# Patient Record
Sex: FEMALE | Race: BLACK OR AFRICAN AMERICAN | NOT HISPANIC OR LATINO | Employment: OTHER | URBAN - METROPOLITAN AREA
[De-identification: names, ages, dates, MRNs, and addresses within clinical notes are randomized per-mention and may not be internally consistent; named-entity substitution may affect disease eponyms.]

---

## 2018-01-30 ENCOUNTER — TELEPHONE (OUTPATIENT)
Dept: ENDOCRINOLOGY | Facility: CLINIC | Age: 68
End: 2018-01-30

## 2018-04-13 ENCOUNTER — TELEPHONE (OUTPATIENT)
Dept: ENDOCRINOLOGY | Facility: CLINIC | Age: 68
End: 2018-04-13

## 2018-04-13 NOTE — TELEPHONE ENCOUNTER
----- Message from Constance Perdue sent at 4/13/2018  1:41 PM CDT -----  Contact: self  Type:  Patient Returning Call    Who Called:  self  Who Left Message for Patient:  Reena  Does the patient know what this is regarding?:  appointment  Best Call Back Number:  [phone]  Additional Information:

## 2018-04-27 ENCOUNTER — TELEPHONE (OUTPATIENT)
Dept: ENDOCRINOLOGY | Facility: CLINIC | Age: 68
End: 2018-04-27

## 2018-04-27 NOTE — TELEPHONE ENCOUNTER
----- Message from Craig Amaya sent at 4/27/2018  3:17 PM CDT -----  Contact: Patient  Type:  Patient Returning Call    Who Called:  Jonathan  Who Left Message for Patient:  Genera  Does the patient know what this is regarding?:  No  Best Call Back Number:  [phone]  Additional Information:  n/a

## 2018-05-10 ENCOUNTER — LAB VISIT (OUTPATIENT)
Dept: LAB | Facility: HOSPITAL | Age: 68
End: 2018-05-10
Attending: PHYSICIAN ASSISTANT
Payer: MEDICARE

## 2018-05-10 ENCOUNTER — OFFICE VISIT (OUTPATIENT)
Dept: ENDOCRINOLOGY | Facility: CLINIC | Age: 68
End: 2018-05-10
Payer: MEDICARE

## 2018-05-10 VITALS
SYSTOLIC BLOOD PRESSURE: 133 MMHG | RESPIRATION RATE: 16 BRPM | HEART RATE: 54 BPM | WEIGHT: 141.56 LBS | HEIGHT: 61 IN | DIASTOLIC BLOOD PRESSURE: 77 MMHG | BODY MASS INDEX: 26.73 KG/M2

## 2018-05-10 DIAGNOSIS — R73.9 HYPERGLYCEMIA: ICD-10-CM

## 2018-05-10 DIAGNOSIS — E05.90 HYPERTHYROIDISM: ICD-10-CM

## 2018-05-10 DIAGNOSIS — E55.9 HYPOVITAMINOSIS D: ICD-10-CM

## 2018-05-10 DIAGNOSIS — Z78.0 POSTMENOPAUSAL: ICD-10-CM

## 2018-05-10 DIAGNOSIS — E78.5 HYPERLIPIDEMIA, UNSPECIFIED HYPERLIPIDEMIA TYPE: ICD-10-CM

## 2018-05-10 DIAGNOSIS — I10 HYPERTENSION, UNSPECIFIED TYPE: ICD-10-CM

## 2018-05-10 DIAGNOSIS — E05.90 HYPERTHYROIDISM: Primary | ICD-10-CM

## 2018-05-10 DIAGNOSIS — E04.1 NODULAR THYROID DISEASE: ICD-10-CM

## 2018-05-10 LAB
25(OH)D3+25(OH)D2 SERPL-MCNC: 33 NG/ML
ALBUMIN SERPL BCP-MCNC: 4 G/DL
ALP SERPL-CCNC: 54 U/L
ALT SERPL W/O P-5'-P-CCNC: 20 U/L
ANION GAP SERPL CALC-SCNC: 9 MMOL/L
AST SERPL-CCNC: 18 U/L
BASOPHILS # BLD AUTO: 0.02 K/UL
BASOPHILS NFR BLD: 0.4 %
BILIRUB SERPL-MCNC: 0.5 MG/DL
BUN SERPL-MCNC: 17 MG/DL
CA-I BLDV-SCNC: 1.31 MMOL/L
CALCIUM SERPL-MCNC: 10 MG/DL
CHLORIDE SERPL-SCNC: 104 MMOL/L
CHOLEST SERPL-MCNC: 188 MG/DL
CHOLEST/HDLC SERPL: 3.4 {RATIO}
CO2 SERPL-SCNC: 31 MMOL/L
CREAT SERPL-MCNC: 0.8 MG/DL
DIFFERENTIAL METHOD: ABNORMAL
EOSINOPHIL # BLD AUTO: 0.1 K/UL
EOSINOPHIL NFR BLD: 1.5 %
ERYTHROCYTE [DISTWIDTH] IN BLOOD BY AUTOMATED COUNT: 12.1 %
EST. GFR  (AFRICAN AMERICAN): >60 ML/MIN/1.73 M^2
EST. GFR  (NON AFRICAN AMERICAN): >60 ML/MIN/1.73 M^2
ESTIMATED AVG GLUCOSE: 105 MG/DL
GLUCOSE SERPL-MCNC: 87 MG/DL
HBA1C MFR BLD HPLC: 5.3 %
HCT VFR BLD AUTO: 41.4 %
HDLC SERPL-MCNC: 56 MG/DL
HDLC SERPL: 29.8 %
HGB BLD-MCNC: 13.2 G/DL
IMM GRANULOCYTES # BLD AUTO: 0.01 K/UL
IMM GRANULOCYTES NFR BLD AUTO: 0.2 %
LDLC SERPL CALC-MCNC: 114.4 MG/DL
LYMPHOCYTES # BLD AUTO: 1.8 K/UL
LYMPHOCYTES NFR BLD: 38.2 %
MAGNESIUM SERPL-MCNC: 2.1 MG/DL
MCH RBC QN AUTO: 32.4 PG
MCHC RBC AUTO-ENTMCNC: 31.9 G/DL
MCV RBC AUTO: 102 FL
MONOCYTES # BLD AUTO: 0.3 K/UL
MONOCYTES NFR BLD: 6.3 %
NEUTROPHILS # BLD AUTO: 2.6 K/UL
NEUTROPHILS NFR BLD: 53.4 %
NONHDLC SERPL-MCNC: 132 MG/DL
NRBC BLD-RTO: 0 /100 WBC
PHOSPHATE SERPL-MCNC: 3 MG/DL
PLATELET # BLD AUTO: 295 K/UL
PMV BLD AUTO: 9.9 FL
POTASSIUM SERPL-SCNC: 3.7 MMOL/L
PROT SERPL-MCNC: 7.7 G/DL
PTH-INTACT SERPL-MCNC: 54 PG/ML
RBC # BLD AUTO: 4.07 M/UL
SODIUM SERPL-SCNC: 144 MMOL/L
T3 SERPL-MCNC: 125 NG/DL
T4 FREE SERPL-MCNC: 0.96 NG/DL
THYROPEROXIDASE IGG SERPL-ACNC: 519.4 IU/ML
TRIGL SERPL-MCNC: 88 MG/DL
TSH SERPL DL<=0.005 MIU/L-ACNC: 0.03 UIU/ML
WBC # BLD AUTO: 4.79 K/UL

## 2018-05-10 PROCEDURE — 84443 ASSAY THYROID STIM HORMONE: CPT

## 2018-05-10 PROCEDURE — 99999 PR PBB SHADOW E&M-EST. PATIENT-LVL III: CPT | Mod: PBBFAC,,, | Performed by: PHYSICIAN ASSISTANT

## 2018-05-10 PROCEDURE — 86376 MICROSOMAL ANTIBODY EACH: CPT

## 2018-05-10 PROCEDURE — 84439 ASSAY OF FREE THYROXINE: CPT

## 2018-05-10 PROCEDURE — 99203 OFFICE O/P NEW LOW 30 MIN: CPT | Mod: S$GLB,,, | Performed by: PHYSICIAN ASSISTANT

## 2018-05-10 PROCEDURE — 80061 LIPID PANEL: CPT

## 2018-05-10 PROCEDURE — 83970 ASSAY OF PARATHORMONE: CPT

## 2018-05-10 PROCEDURE — 83519 RIA NONANTIBODY: CPT

## 2018-05-10 PROCEDURE — 84480 ASSAY TRIIODOTHYRONINE (T3): CPT

## 2018-05-10 PROCEDURE — 84432 ASSAY OF THYROGLOBULIN: CPT

## 2018-05-10 PROCEDURE — 83520 IMMUNOASSAY QUANT NOS NONAB: CPT

## 2018-05-10 PROCEDURE — 84445 ASSAY OF TSI GLOBULIN: CPT

## 2018-05-10 PROCEDURE — 82330 ASSAY OF CALCIUM: CPT

## 2018-05-10 PROCEDURE — 82306 VITAMIN D 25 HYDROXY: CPT

## 2018-05-10 PROCEDURE — 83735 ASSAY OF MAGNESIUM: CPT

## 2018-05-10 PROCEDURE — 36415 COLL VENOUS BLD VENIPUNCTURE: CPT | Mod: PO

## 2018-05-10 PROCEDURE — 84100 ASSAY OF PHOSPHORUS: CPT

## 2018-05-10 PROCEDURE — 83036 HEMOGLOBIN GLYCOSYLATED A1C: CPT

## 2018-05-10 PROCEDURE — 85025 COMPLETE CBC W/AUTO DIFF WBC: CPT

## 2018-05-10 PROCEDURE — 80053 COMPREHEN METABOLIC PANEL: CPT

## 2018-05-10 RX ORDER — VALSARTAN AND HYDROCHLOROTHIAZIDE 160; 25 MG/1; MG/1
TABLET ORAL
COMMUNITY
End: 2018-11-12

## 2018-05-10 RX ORDER — SIMVASTATIN 40 MG/1
TABLET, FILM COATED ORAL
COMMUNITY
End: 2021-02-26

## 2018-05-10 RX ORDER — METHIMAZOLE 10 MG/1
TABLET ORAL
COMMUNITY
End: 2018-05-14

## 2018-05-10 NOTE — PROGRESS NOTES
"CC: Hyperthyroidism    HPI: Jonathan Hinton is a 68 y.o. female here for hyperthyroidism along with other conditions listed in the Visit Diagnosis. Diagnosed several years ago. +FHx of thyroid disease in one sister and two nieces. Her dad, brother and three uncles have DM.    PMHx, PSHx: reviewed in epic.    Social Hx: no ETOH/tobacco use. She smoked > ten years ~1 pack per day. She quit two months ago on her own. She worked at [employer].    New to me and endocrine today.    She has been taking tapazole for several years. She has being seeing Dr. Koroma for her thyroid    She has tearing and feel grittiness in her eyes.    Reports palpitations while walking, heat intolerance, sweating, diarrhea/constipation, hair loss.     No tremors, changes in nails.    No falls this year. Reports fracturing her elbow after falling from a ladder. No steroid injections.    Eye exam: 10/2017- cataracts    Last thyroid u/s 9/17 showed multinodular goiter with the largest nodule being 2.2 cm in the lower left lobe. She has not had an FNA on this nodule. U/s in labs below.    No SOB, voice changes or dysphagia.    ROS:   Constitutional: feels tired, wt gain  Eyes: blurry vision, denies double vision  Cardiovascular: Denies current anginal symptoms  Respiratory: Denies current respiratory difficulty  Gastrointestinal: Denies recent bowel disturbances  GenitoUrinary - No dysuria  Skin: No new skin rash  Musc: legs and arms pain, knee pain  Neurologic:+numbness and tingling in hands  Endocrine: no polyphagia, polydipsia, polyuria  Remainder ROS negative     /77   Pulse (!) 54   Resp 16   Ht 5' 1" (1.549 m)   Wt 64.2 kg (141 lb 8.6 oz)   BMI 26.74 kg/m²      Personally reviewed imaging:      No results found for: TSH, A2KSOLF, M0GYYXA, THYROIDAB, FREET4       Chemistry    No results found for: NA, K, CL, CO2, BUN, CREATININE, GLU No results found for: CALCIUM, ALKPHOS, AST, ALT, BILITOT, ESTGFRAFRICA, EGFRNONAA      No results " found for: HGBA1C     PE:  GENERAL: Elderly female sitting comfortably in chair. Well hydrated. NAD.  NECK: Supple neck, normal thyroid. No bruit  LYMPHATIC: No cervical or supraclavicular lymphadenopathy  CARDIOVASCULAR: Normal heart sounds, no pedal edema  RESPIRATORY: Normal effort, CTAB.  ABDOMEN: soft, non-tender, non-distended.  FEET: appropriate footwear.   Psych: jovial mood, AAO x 3.  NEURO:CN ll-Xll intact    Assessment/Plan:   1. Hyperthyroidism  methIMAzole (TAPAZOLE) 10 MG Tab    TSH    T4, free    T3    Thyroglobulin    Thyroid peroxidase antibody    Thyroid stimulating immunoglobulin    Thyrotropin receptor antibody    CBC auto differential    Comprehensive metabolic panel    Thyrotropin-Binding Inhibitory Immunoglobulin (TBII)   2. Nodular thyroid disease  US Soft Tissue Head Neck Thyroid   3. Postmenopausal  PTH, intact    Magnesium    Calcium, ionized    Phosphorus   4. Hyperglycemia  Hemoglobin A1c   5. Hyperlipidemia, unspecified hyperlipidemia type  simvastatin (ZOCOR) 40 MG tablet    Lipid panel   6. Hypovitaminosis D  Vitamin D   7. Hypertension, unspecified type  valsartan-hydrochlorothiazide (DIOVAN-HCT) 160-25 mg per tablet      Hyperthyroidism  Labs    Nodular thyroid disease  Repeat thyroid u/s    Postmenopausal  Labs  [facility] did not have a recent DEXA.   Dexa scan    Hyperglycemia-check a1c    Hyperlipidemia-check LP    Hypovitaminosis D-check vitamin D    Hypertension-stable-monitor    F/u in 4 months

## 2018-05-11 NOTE — PROGRESS NOTES
I have reviewed the notes, assessments, and/or procedures performed by Peyton Centeno PA-C. I agree with the documented management plan and clinical case summary.

## 2018-05-12 LAB
THRYOGLOBULIN INTERPRETATION: ABNORMAL
THYROGLOB AB SERPL-ACNC: <1.8 IU/ML
THYROGLOB SERPL-MCNC: 0.9 NG/ML

## 2018-05-14 DIAGNOSIS — E05.90 HYPERTHYROIDISM: Primary | ICD-10-CM

## 2018-05-14 LAB
TSH RECEP AB SER-ACNC: 1.58 IU/L
TSI SER-ACNC: 1.51 IU/L

## 2018-05-14 RX ORDER — METHIMAZOLE 5 MG/1
5 TABLET ORAL 3 TIMES DAILY
Qty: 90 TABLET | Refills: 11 | Status: SHIPPED | OUTPATIENT
Start: 2018-05-14 | End: 2018-11-12 | Stop reason: SDUPTHER

## 2018-05-15 ENCOUNTER — HOSPITAL ENCOUNTER (OUTPATIENT)
Dept: RADIOLOGY | Facility: CLINIC | Age: 68
Discharge: HOME | End: 2018-05-15
Attending: PHYSICIAN ASSISTANT
Payer: MEDICARE

## 2018-05-15 DIAGNOSIS — E04.1 NODULAR THYROID DISEASE: ICD-10-CM

## 2018-05-15 DIAGNOSIS — Z78.0 POSTMENOPAUSAL: ICD-10-CM

## 2018-05-15 PROCEDURE — 76536 US EXAM OF HEAD AND NECK: CPT | Mod: TC,PO

## 2018-05-15 PROCEDURE — 76536 US EXAM OF HEAD AND NECK: CPT | Mod: 26,,, | Performed by: RADIOLOGY

## 2018-05-15 PROCEDURE — 77080 DXA BONE DENSITY AXIAL: CPT | Mod: TC,PO

## 2018-05-15 PROCEDURE — 77080 DXA BONE DENSITY AXIAL: CPT | Mod: 26,,, | Performed by: RADIOLOGY

## 2018-05-22 LAB — TSH BII SER-ACNC: 4 %INHIBITION

## 2018-07-10 ENCOUNTER — LAB VISIT (OUTPATIENT)
Dept: LAB | Facility: HOSPITAL | Age: 68
End: 2018-07-10
Attending: PHYSICIAN ASSISTANT
Payer: MEDICARE

## 2018-07-10 DIAGNOSIS — E05.90 HYPERTHYROIDISM: ICD-10-CM

## 2018-07-10 LAB
T3 SERPL-MCNC: 107 NG/DL
T4 FREE SERPL-MCNC: 0.78 NG/DL
TSH SERPL DL<=0.005 MIU/L-ACNC: 1.77 UIU/ML

## 2018-07-10 PROCEDURE — 36415 COLL VENOUS BLD VENIPUNCTURE: CPT | Mod: PO

## 2018-07-10 PROCEDURE — 84480 ASSAY TRIIODOTHYRONINE (T3): CPT

## 2018-07-10 PROCEDURE — 84439 ASSAY OF FREE THYROXINE: CPT

## 2018-07-10 PROCEDURE — 84443 ASSAY THYROID STIM HORMONE: CPT

## 2018-07-12 ENCOUNTER — TELEPHONE (OUTPATIENT)
Dept: ENDOCRINOLOGY | Facility: CLINIC | Age: 68
End: 2018-07-12

## 2018-07-13 ENCOUNTER — TELEPHONE (OUTPATIENT)
Dept: ENDOCRINOLOGY | Facility: CLINIC | Age: 68
End: 2018-07-13

## 2018-07-16 ENCOUNTER — TELEPHONE (OUTPATIENT)
Dept: ENDOCRINOLOGY | Facility: CLINIC | Age: 68
End: 2018-07-16

## 2018-07-16 NOTE — TELEPHONE ENCOUNTER
----- Message from Asad Koroma Jr., MD sent at 7/16/2018 11:42 AM CDT -----  Contact: pt  We didn't call her because I haven't seen her since moving to [place].  It looks like the call probably came from Endocrine so I'm not sure why it looks like it came from me.    ----- Message -----  From: Travis Ball MA  Sent: 7/13/2018   8:09 AM  To: Asad Koroma Jr., MD        ----- Message -----  From: Erika Mandujano  Sent: 7/12/2018   4:27 PM  To: Asad Koroma Staff    Pt is returning a call to speak with nurse about her test results, please call to advise  Call place to pod no answer  Call back   Thanks

## 2018-07-17 ENCOUNTER — TELEPHONE (OUTPATIENT)
Dept: ENDOCRINOLOGY | Facility: CLINIC | Age: 68
End: 2018-07-17

## 2018-07-18 ENCOUNTER — TELEPHONE (OUTPATIENT)
Dept: ENDOCRINOLOGY | Facility: CLINIC | Age: 68
End: 2018-07-18

## 2018-07-24 ENCOUNTER — TELEPHONE (OUTPATIENT)
Dept: ENDOCRINOLOGY | Facility: CLINIC | Age: 68
End: 2018-07-24

## 2018-08-03 ENCOUNTER — TELEPHONE (OUTPATIENT)
Dept: FAMILY MEDICINE | Facility: CLINIC | Age: 68
End: 2018-08-03

## 2018-08-06 ENCOUNTER — TELEPHONE (OUTPATIENT)
Dept: INTERNAL MEDICINE | Facility: CLINIC | Age: 68
End: 2018-08-06

## 2018-08-06 NOTE — TELEPHONE ENCOUNTER
----- Message from Rose Forrest sent at 8/6/2018  1:59 PM CDT -----  Contact: CHAYA SANDERS [[id number]]            Name of Who is Calling: CHAYA SANDERS [[id number]]      What is the request in detail: Pt is calling to discuss her results.  Pt says that she's been out of town and has return home.  Please contact pt to further discuss and advise.    Can the clinic reply by [facility]: No      What Number to [facility] Back [facility]: [phone]

## 2018-08-08 ENCOUNTER — TELEPHONE (OUTPATIENT)
Dept: ENDOCRINOLOGY | Facility: CLINIC | Age: 68
End: 2018-08-08

## 2018-09-10 ENCOUNTER — LAB VISIT (OUTPATIENT)
Dept: LAB | Facility: HOSPITAL | Age: 68
End: 2018-09-10
Attending: PHYSICIAN ASSISTANT
Payer: MEDICARE

## 2018-09-10 ENCOUNTER — OFFICE VISIT (OUTPATIENT)
Dept: ENDOCRINOLOGY | Facility: CLINIC | Age: 68
End: 2018-09-10
Payer: MEDICARE

## 2018-09-10 VITALS
DIASTOLIC BLOOD PRESSURE: 73 MMHG | WEIGHT: 148.38 LBS | HEIGHT: 61 IN | BODY MASS INDEX: 28.01 KG/M2 | TEMPERATURE: 98 F | HEART RATE: 53 BPM | SYSTOLIC BLOOD PRESSURE: 149 MMHG

## 2018-09-10 DIAGNOSIS — I10 HYPERTENSION, UNSPECIFIED TYPE: ICD-10-CM

## 2018-09-10 DIAGNOSIS — E78.5 HYPERLIPIDEMIA, UNSPECIFIED HYPERLIPIDEMIA TYPE: ICD-10-CM

## 2018-09-10 DIAGNOSIS — E04.1 NODULAR THYROID DISEASE: ICD-10-CM

## 2018-09-10 DIAGNOSIS — M85.80 OSTEOPENIA, UNSPECIFIED LOCATION: ICD-10-CM

## 2018-09-10 DIAGNOSIS — E05.90 HYPERTHYROIDISM: Primary | ICD-10-CM

## 2018-09-10 DIAGNOSIS — E05.90 HYPERTHYROIDISM: ICD-10-CM

## 2018-09-10 LAB
ANION GAP SERPL CALC-SCNC: 13 MMOL/L
BUN SERPL-MCNC: 15 MG/DL
CALCIUM SERPL-MCNC: 10 MG/DL
CHLORIDE SERPL-SCNC: 103 MMOL/L
CO2 SERPL-SCNC: 26 MMOL/L
CREAT SERPL-MCNC: 0.7 MG/DL
EST. GFR  (AFRICAN AMERICAN): >60 ML/MIN/1.73 M^2
EST. GFR  (NON AFRICAN AMERICAN): >60 ML/MIN/1.73 M^2
GLUCOSE SERPL-MCNC: 78 MG/DL
POTASSIUM SERPL-SCNC: 3.6 MMOL/L
SODIUM SERPL-SCNC: 142 MMOL/L
T3 SERPL-MCNC: 105 NG/DL
T4 FREE SERPL-MCNC: 0.91 NG/DL
TSH SERPL DL<=0.005 MIU/L-ACNC: 1.66 UIU/ML

## 2018-09-10 PROCEDURE — 84480 ASSAY TRIIODOTHYRONINE (T3): CPT

## 2018-09-10 PROCEDURE — 82308 ASSAY OF CALCITONIN: CPT

## 2018-09-10 PROCEDURE — 84445 ASSAY OF TSI GLOBULIN: CPT

## 2018-09-10 PROCEDURE — 99213 OFFICE O/P EST LOW 20 MIN: CPT | Mod: S$PBB,,, | Performed by: PHYSICIAN ASSISTANT

## 2018-09-10 PROCEDURE — 84439 ASSAY OF FREE THYROXINE: CPT

## 2018-09-10 PROCEDURE — 99213 OFFICE O/P EST LOW 20 MIN: CPT | Mod: PBBFAC,PO | Performed by: PHYSICIAN ASSISTANT

## 2018-09-10 PROCEDURE — 99999 PR PBB SHADOW E&M-EST. PATIENT-LVL III: CPT | Mod: PBBFAC,,, | Performed by: PHYSICIAN ASSISTANT

## 2018-09-10 PROCEDURE — 80048 BASIC METABOLIC PNL TOTAL CA: CPT

## 2018-09-10 PROCEDURE — 86376 MICROSOMAL ANTIBODY EACH: CPT

## 2018-09-10 PROCEDURE — 84432 ASSAY OF THYROGLOBULIN: CPT

## 2018-09-10 PROCEDURE — 36415 COLL VENOUS BLD VENIPUNCTURE: CPT | Mod: PO

## 2018-09-10 PROCEDURE — 84443 ASSAY THYROID STIM HORMONE: CPT

## 2018-09-10 RX ORDER — LOSARTAN POTASSIUM AND HYDROCHLOROTHIAZIDE 12.5; 5 MG/1; MG/1
1 TABLET ORAL DAILY
COMMUNITY
End: 2020-11-09

## 2018-09-10 NOTE — PROGRESS NOTES
CC: Hyperthyroidism    HPI: Jonathan Hinton is a 68 y.o. female here for hyperthyroidism along with other conditions listed in the Visit Diagnosis. Diagnosed several years ago. +FHx of thyroid disease in one sister and two nieces. Her dad, brother and three uncles have DM.    PMHx, PSHx: reviewed in epic.    Social Hx: no ETOH/tobacco use. She smoked > ten years ~1 pack per day. She quit two months ago on her own. She worked at [employer].    She has been taking tapazole for many years. She has being seeing Dr. Koroma for her thyroid. She has been taking 5 mg of tapazole daily for 2 mths. Prescribed 15 mg at last visit and her PCP, Dr. Rodriguez changed the dose to 5 mg daily.  Also, her blood pressure was changed ~1 mth ago and she is going to see Dr. Rodriguez on the 19th for this issue.    She has tearing and feels grittiness in her eyes.    + palpitations while walking, heat intolerance, sweating, constipation, hair loss, thin nails.     No tremors or diarrhea.    No falls this year. Reports fracturing her elbow after falling from a ladder. No steroid injections. She is taking vitamin d and calcium.    Eye exam: 10/2017- cataracts    Last thyroid u/s 5/18 showed two nodules in the right lobe that are ~1.2 cm. The dominant left sided nodule has resolved.     She goes to gym 1x week and walks. Also, she has been lifting small weights.    Last DEXA 5/18 showed osteopenia in the spine and hip.    No SOB, voice changes or dysphagia.    ROS:   Constitutional: feels tired, wt gain.   Eyes: blurry vision, denies double vision.   Cardiovascular: Denies current anginal symptoms  Respiratory: Denies current respiratory difficulty  Gastrointestinal: Denies recent bowel disturbances  GenitoUrinary - No dysuria  Skin: No new skin rash  Musc: legs and arms pain, knee pain  Neurologic:+numbness and tingling in hands, + headaches.    Endocrine: no polyphagia, polydipsia, polyuria  Remainder ROS negative     BP (!) 149/73 (BP Location:  "Right arm, Patient Position: Sitting, BP Method: Large (Automatic))   Pulse (!) 53   Temp 97.9 °F (36.6 °C) (Oral)   Ht 5' 1" (1.549 m)   Wt 67.3 kg (148 lb 5.9 oz)   BMI 28.03 kg/m²      Personally reviewed imaging and labs below:      Lab Results   Component Value Date    TSH 1.664 09/10/2018    A7IWCSU 105 09/10/2018    FREET4 0.91 09/10/2018        Chemistry        Component Value Date/Time     05/10/2018 1120    K 3.7 05/10/2018 1120     05/10/2018 1120    CO2 31 (H) 05/10/2018 1120    BUN 17 05/10/2018 1120    CREATININE 0.8 05/10/2018 1120    GLU 87 05/10/2018 1120        Component Value Date/Time    CALCIUM 10.0 05/10/2018 1120    ALKPHOS 54 (L) 05/10/2018 1120    AST 18 05/10/2018 1120    ALT 20 05/10/2018 1120    BILITOT 0.5 05/10/2018 1120    ESTGFRAFRICA >60.0 05/10/2018 1120    EGFRNONAA >60.0 05/10/2018 1120         Lab Results   Component Value Date    HGBA1C 5.3 05/10/2018      PE:  GENERAL: Elderly female, Well hydrated. NAD.  NECK: Supple neck, normal thyroid. No bruit  LYMPHATIC: No cervical or supraclavicular lymphadenopathy  CARDIOVASCULAR: Normal heart sounds, no pedal edema  RESPIRATORY: Normal effort, CTAB.  ABDOMEN: soft, non-tender, non-distended.  FEET: appropriate footwear.   Psych: appropriate mood and affect  NEURO:CN ll-Xll intact    Assessment/Plan:   1. Hyperthyroidism  Thyroid stimulating immunoglobulin    Thyroglobulin    T3    T4, free    TSH    Thyroglobulin    Thyroid peroxidase antibody    Basic metabolic panel   2. Nodular thyroid disease  Calcitonin   3. Osteopenia, unspecified location     4. Hyperlipidemia, unspecified hyperlipidemia type     5. Hypertension, unspecified type        Hyperthyroidism-thyroid hormones are normal. Continue methimazole 5 mg daily.   Nodular thyroid disease-Repeat thyroid u/s 5/19  Osteopenia-continue ca and vitamin D. Continue weight bearing exercises. Repeat DEXA scan 5/20  [id number] " zocor  Hypertension-elevated-She wants her PCP to change her medication. Continue meds.    F/u in 2 months

## 2018-09-11 LAB — THYROPEROXIDASE IGG SERPL-ACNC: 282.2 IU/ML

## 2018-09-12 LAB
CALCIT SERPL-MCNC: <5 PG/ML
THRYOGLOBULIN INTERPRETATION: ABNORMAL
THRYOGLOBULIN INTERPRETATION: ABNORMAL
THYROGLOB AB SERPL-ACNC: <1.8 IU/ML
THYROGLOB AB SERPL-ACNC: <1.8 IU/ML
THYROGLOB SERPL-MCNC: 5.2 NG/ML
THYROGLOB SERPL-MCNC: 5.2 NG/ML
TSI SER-ACNC: 0.41 IU/L

## 2018-11-12 ENCOUNTER — OFFICE VISIT (OUTPATIENT)
Dept: ENDOCRINOLOGY | Facility: CLINIC | Age: 68
End: 2018-11-12
Payer: MEDICARE

## 2018-11-12 VITALS
WEIGHT: 147.5 LBS | BODY MASS INDEX: 27.85 KG/M2 | HEIGHT: 61 IN | SYSTOLIC BLOOD PRESSURE: 137 MMHG | TEMPERATURE: 98 F | RESPIRATION RATE: 16 BRPM | DIASTOLIC BLOOD PRESSURE: 78 MMHG | HEART RATE: 59 BPM

## 2018-11-12 DIAGNOSIS — E05.90 HYPERTHYROIDISM: ICD-10-CM

## 2018-11-12 DIAGNOSIS — E55.9 HYPOVITAMINOSIS D: ICD-10-CM

## 2018-11-12 DIAGNOSIS — E78.5 HYPERLIPIDEMIA, UNSPECIFIED HYPERLIPIDEMIA TYPE: ICD-10-CM

## 2018-11-12 DIAGNOSIS — E05.00 GRAVES' DISEASE: Primary | ICD-10-CM

## 2018-11-12 DIAGNOSIS — E04.1 NODULAR THYROID DISEASE: ICD-10-CM

## 2018-11-12 DIAGNOSIS — I10 HYPERTENSION, UNSPECIFIED TYPE: ICD-10-CM

## 2018-11-12 DIAGNOSIS — M85.80 OSTEOPENIA, UNSPECIFIED LOCATION: ICD-10-CM

## 2018-11-12 PROCEDURE — 99999 PR PBB SHADOW E&M-EST. PATIENT-LVL IV: CPT | Mod: PBBFAC,,, | Performed by: PHYSICIAN ASSISTANT

## 2018-11-12 PROCEDURE — 99214 OFFICE O/P EST MOD 30 MIN: CPT | Mod: S$GLB,,, | Performed by: PHYSICIAN ASSISTANT

## 2018-11-12 RX ORDER — METHIMAZOLE 5 MG/1
5 TABLET ORAL DAILY
Qty: 90 TABLET | Refills: 3
Start: 2018-11-12 | End: 2020-11-09 | Stop reason: SDUPTHER

## 2019-05-03 ENCOUNTER — HOSPITAL ENCOUNTER (OUTPATIENT)
Dept: RADIOLOGY | Facility: CLINIC | Age: 69
Discharge: HOME | End: 2019-05-03
Attending: PHYSICIAN ASSISTANT
Payer: MEDICARE

## 2019-05-03 DIAGNOSIS — E04.1 NODULAR THYROID DISEASE: ICD-10-CM

## 2019-05-03 PROCEDURE — 76536 US EXAM OF HEAD AND NECK: CPT | Mod: TC,PO

## 2019-05-03 PROCEDURE — 76536 US SOFT TISSUE HEAD NECK THYROID: ICD-10-PCS | Mod: 26,,, | Performed by: RADIOLOGY

## 2019-05-03 PROCEDURE — 76536 US EXAM OF HEAD AND NECK: CPT | Mod: 26,,, | Performed by: RADIOLOGY

## 2019-05-10 ENCOUNTER — TELEPHONE (OUTPATIENT)
Dept: ENDOCRINOLOGY | Facility: CLINIC | Age: 69
End: 2019-05-10

## 2019-05-10 ENCOUNTER — OFFICE VISIT (OUTPATIENT)
Dept: ENDOCRINOLOGY | Facility: CLINIC | Age: 69
End: 2019-05-10
Payer: MEDICARE

## 2019-05-10 VITALS
HEIGHT: 61 IN | BODY MASS INDEX: 26.41 KG/M2 | WEIGHT: 139.88 LBS | HEART RATE: 67 BPM | RESPIRATION RATE: 16 BRPM | TEMPERATURE: 98 F | DIASTOLIC BLOOD PRESSURE: 66 MMHG | SYSTOLIC BLOOD PRESSURE: 114 MMHG

## 2019-05-10 DIAGNOSIS — E55.9 HYPOVITAMINOSIS D: ICD-10-CM

## 2019-05-10 DIAGNOSIS — E05.00 GRAVES' DISEASE: ICD-10-CM

## 2019-05-10 DIAGNOSIS — I10 HYPERTENSION, UNSPECIFIED TYPE: ICD-10-CM

## 2019-05-10 DIAGNOSIS — E78.5 HYPERLIPIDEMIA, UNSPECIFIED HYPERLIPIDEMIA TYPE: ICD-10-CM

## 2019-05-10 DIAGNOSIS — M85.80 OSTEOPENIA, UNSPECIFIED LOCATION: ICD-10-CM

## 2019-05-10 DIAGNOSIS — E04.1 NODULAR THYROID DISEASE: ICD-10-CM

## 2019-05-10 DIAGNOSIS — E05.90 HYPERTHYROIDISM: Primary | ICD-10-CM

## 2019-05-10 PROCEDURE — 99999 PR PBB SHADOW E&M-EST. PATIENT-LVL III: ICD-10-PCS | Mod: PBBFAC,,, | Performed by: PHYSICIAN ASSISTANT

## 2019-05-10 PROCEDURE — 99214 PR OFFICE/OUTPT VISIT, EST, LEVL IV, 30-39 MIN: ICD-10-PCS | Mod: S$GLB,,, | Performed by: PHYSICIAN ASSISTANT

## 2019-05-10 PROCEDURE — 99999 PR PBB SHADOW E&M-EST. PATIENT-LVL III: CPT | Mod: PBBFAC,,, | Performed by: PHYSICIAN ASSISTANT

## 2019-05-10 PROCEDURE — 99214 OFFICE O/P EST MOD 30 MIN: CPT | Mod: S$GLB,,, | Performed by: PHYSICIAN ASSISTANT

## 2019-05-10 RX ORDER — TRAZODONE HYDROCHLORIDE 50 MG/1
TABLET ORAL
COMMUNITY
End: 2021-02-10 | Stop reason: SDUPTHER

## 2019-05-10 NOTE — TELEPHONE ENCOUNTER
----- Message from Sandra Avendaño sent at 5/10/2019  2:42 PM CDT -----  Contact[facility] dowell[phone]  She said she can find an incident report, ultrasound report and path  Report from 2017.  Did you want to go back that far?

## 2019-05-10 NOTE — PROGRESS NOTES
"CC: Hyperthyroidism    HPI: Jonathan Hinton is a 69 y.o. female here for hyperthyroidism that was diagnosed several years ago along with pending conditions listed in the Visit Diagnosis. +FHx of thyroid disease in one sister and two nieces. Her dad, brother and three uncles have DM.    PMHx, PSHx: reviewed in epic.    Social Hx: no ETOH/tobacco use. She smoked > ten years ~1 pack per day. She quit in 5/18 on her own. She worked at [employer].    She has been taking tapazole for many years. Taking 5 mg tapazole daily. She has tearing and feels grittiness in her eyes.    + occasional palpitations, sweating, hair loss,     No fatigue, heat intolerance, sweating, diarrhea/constipation,     Last DEXA 5/18 showed osteopenia in the spine and hip. No falls this year.  No steroid injections. She is taking vitamin d and calcium.   Eye exam: 10/2017- cataracts    Last thyroid u/s 5/19 showed a 1.4 cm nodule with microcalcifications in the left lobe. This was seen on u/s in 2017. No SOB, voice changes or dysphagia.    She goes to gym 1x week and walks. Also, she has been working on the weight machines at [employer].      ROS:   Constitutional: energy has increased, wt loss.   Eyes: blurry vision, denies double vision.   Cardiovascular: Denies current anginal symptoms  Respiratory: Denies current respiratory difficulty  Gastrointestinal: Denies recent bowel disturbances  GenitoUrinary - No dysuria  Skin: No new skin rash  Musc: legs and arms pain, knee pain  Neurologic:+numbness and tingling in hands, + headaches.    Endocrine: no polyphagia, polydipsia, polyuria  Remainder ROS negative     /66 (BP Location: Left arm, Patient Position: Sitting, BP Method: Medium (Automatic))   Pulse 67   Temp 98.1 °F (36.7 °C) (Oral)   Resp 16   Ht 5' 1" (1.549 m)   Wt 63.5 kg (139 lb 14.1 oz)   BMI 26.43 kg/m²      Personally reviewed imaging and labs below:    Lab Results   Component Value Date    TSH 0.724 05/03/2019    V0XBFJU " 108 05/03/2019    FREET4 0.98 05/03/2019        Chemistry        Component Value Date/Time     05/03/2019 0950    K 3.8 05/03/2019 0950     05/03/2019 0950    CO2 31 (H) 05/03/2019 0950    BUN 18 05/03/2019 0950    CREATININE 0.8 05/03/2019 0950    GLU 99 05/03/2019 0950        Component Value Date/Time    CALCIUM 10.1 05/03/2019 0950    ALKPHOS 44 (L) 05/03/2019 0950    AST 17 05/03/2019 0950    ALT 15 05/03/2019 0950    BILITOT 0.5 05/03/2019 0950    ESTGFRAFRICA >60.0 05/03/2019 0950    EGFRNONAA >60.0 05/03/2019 0950         Lab Results   Component Value Date    HGBA1C 5.3 05/10/2018      EXAMINATION:  US SOFT TISSUE HEAD NECK THYROID    CLINICAL HISTORY:  Nontoxic single thyroid nodule    TECHNIQUE:  Ultrasound of the thyroid and cervical lymph nodes was performed.    COMPARISON:  Thyroid ultrasound-05/15/2018    FINDINGS:  The right thyroid lobe measures 4.8 x 1.9 x 1.7 cm in the left thyroid lobe measures 5.0 x 2.0 x 2.1 cm.  The total thyroid weight is approximately 19.3 g.  There is a diffusely heterogeneous thyroid parenchymal echotexture present.  There are at least 2 solid nodules present within the right thyroid lobe.  There is a 11 x 9 x 7 mm well-circumscribed isoechoic nodule with internal macrocalcifications noted at the junction of the mid and lower pole of the right thyroid lobe posteriorly.  There is also a 12 x 9 x 12 mm ill-defined isoechoic nodule present posteriorly in the lower pole of the right thyroid lobe.  No associated microcalcifications.    The largest solid nodule in the left thyroid lobe measures 14 x 12 x 11 mm with in the lower pole of the left thyroid lobe.  This nodule is isoechoic and shows internal microcalcifications.  In retrospect, this nodule appears to have been present previously and appears unchanged from the previous study.  There is also a 10 x 9 x 9 mm solid nodule present within the superior pole of the left thyroid lobe which shows internal  macrocalcifications and a hypoechoic rim.    No pathologically enlarged or morphologically abnormal lymph nodes adjacent to the left or right thyroid fossa.      Impression       Multinodular thyroid gland with multiple solid thyroid nodules appreciated..  The largest nodule is solid measuring 14 mm in maximal dimension and shows internal microcalcifications.  In retrospect, this may have been present previously and appears unchanged.  This nodule represents a TI-RADS TR 4 nodule.  Given size greater than 10 mm but less than 15 mm, follow-up imaging should be performed to ensure stability.      Electronically signed by: Ej Bauer MD  Date: 05/03/2019  Time: 12:06       PE:  GENERAL: Elderly female, Well hydrated. NAD.  NECK: Supple neck, normal thyroid. No bruit  LYMPHATIC: No cervical or supraclavicular lymphadenopathy  CARDIOVASCULAR: Normal heart sounds, no pedal edema  RESPIRATORY: Normal effort, CTAB.  ABDOMEN: soft, non-tender, non-distended.  FEET: appropriate footwear.   Psych: appropriate mood and affect  NEURO:CN ll-Xll intact, steady gait    Assessment/Plan:   1. Hyperthyroidism  TSH    T4, free    T3    Thyroglobulin    Calcitonin    Renal function panel    CBC auto differential   2. Graves' disease     3. Nodular thyroid disease     4. Osteopenia, unspecified location  Calcitonin   5. Hyperlipidemia, unspecified hyperlipidemia type  Lipid panel   6. Hypertension, unspecified type     7. Hypovitaminosis D        Hyperthyroidism/Graves'-TFTs wnl. Continue methimazole 5 mg daily.   Nodular thyroid disease-Repeat thyroid u/s 5/20. FNA of 1.4 cm nodule.  Osteopenia-continue ca and vitamin D. Continue weight bearing exercises. Repeat DEXA scan 5/20  Vhhhcjhmzpcpde-hsxrmk-wpoywhmr zocor  Hypertension-stable-Continue meds.  Hypovitaminosis D-check vd    F/u in 6 months

## 2019-05-15 ENCOUNTER — TELEPHONE (OUTPATIENT)
Dept: FAMILY MEDICINE | Facility: CLINIC | Age: 69
End: 2019-05-15

## 2019-05-16 NOTE — TELEPHONE ENCOUNTER
Spoke to patient and advised her that a FNA would need to be set up. Patient agreeable. States that she just got out the hospital from surgery.

## 2019-11-01 ENCOUNTER — LAB VISIT (OUTPATIENT)
Dept: LAB | Facility: HOSPITAL | Age: 69
End: 2019-11-01
Attending: PHYSICIAN ASSISTANT
Payer: MEDICARE

## 2019-11-01 DIAGNOSIS — E05.90 HYPERTHYROIDISM: ICD-10-CM

## 2019-11-01 DIAGNOSIS — E05.00 GRAVES' DISEASE: ICD-10-CM

## 2019-11-01 DIAGNOSIS — M85.80 OSTEOPENIA, UNSPECIFIED LOCATION: ICD-10-CM

## 2019-11-01 DIAGNOSIS — E78.5 HYPERLIPIDEMIA, UNSPECIFIED HYPERLIPIDEMIA TYPE: ICD-10-CM

## 2019-11-01 LAB
ALBUMIN SERPL BCP-MCNC: 4.1 G/DL (ref 3.5–5.2)
ANION GAP SERPL CALC-SCNC: 9 MMOL/L (ref 8–16)
BASOPHILS # BLD AUTO: 0.02 K/UL (ref 0–0.2)
BASOPHILS NFR BLD: 0.5 % (ref 0–1.9)
BUN SERPL-MCNC: 17 MG/DL (ref 8–23)
CALCIUM SERPL-MCNC: 10.7 MG/DL (ref 8.7–10.5)
CHLORIDE SERPL-SCNC: 101 MMOL/L (ref 95–110)
CHOLEST SERPL-MCNC: 230 MG/DL (ref 120–199)
CHOLEST/HDLC SERPL: 3.3 {RATIO} (ref 2–5)
CO2 SERPL-SCNC: 31 MMOL/L (ref 23–29)
CREAT SERPL-MCNC: 0.8 MG/DL (ref 0.5–1.4)
DIFFERENTIAL METHOD: ABNORMAL
EOSINOPHIL # BLD AUTO: 0.1 K/UL (ref 0–0.5)
EOSINOPHIL NFR BLD: 1.9 % (ref 0–8)
ERYTHROCYTE [DISTWIDTH] IN BLOOD BY AUTOMATED COUNT: 11.7 % (ref 11.5–14.5)
EST. GFR  (AFRICAN AMERICAN): >60 ML/MIN/1.73 M^2
EST. GFR  (NON AFRICAN AMERICAN): >60 ML/MIN/1.73 M^2
GLUCOSE SERPL-MCNC: 80 MG/DL (ref 70–110)
HCT VFR BLD AUTO: 45.4 % (ref 37–48.5)
HDLC SERPL-MCNC: 70 MG/DL (ref 40–75)
HDLC SERPL: 30.4 % (ref 20–50)
HGB BLD-MCNC: 14.3 G/DL (ref 12–16)
IMM GRANULOCYTES # BLD AUTO: 0.01 K/UL (ref 0–0.04)
IMM GRANULOCYTES NFR BLD AUTO: 0.3 % (ref 0–0.5)
LDLC SERPL CALC-MCNC: 140.6 MG/DL (ref 63–159)
LYMPHOCYTES # BLD AUTO: 1.5 K/UL (ref 1–4.8)
LYMPHOCYTES NFR BLD: 39.2 % (ref 18–48)
MCH RBC QN AUTO: 32.3 PG (ref 27–31)
MCHC RBC AUTO-ENTMCNC: 31.5 G/DL (ref 32–36)
MCV RBC AUTO: 103 FL (ref 82–98)
MONOCYTES # BLD AUTO: 0.3 K/UL (ref 0.3–1)
MONOCYTES NFR BLD: 8.2 % (ref 4–15)
NEUTROPHILS # BLD AUTO: 1.9 K/UL (ref 1.8–7.7)
NEUTROPHILS NFR BLD: 49.9 % (ref 38–73)
NONHDLC SERPL-MCNC: 160 MG/DL
NRBC BLD-RTO: 0 /100 WBC
PHOSPHATE SERPL-MCNC: 3.6 MG/DL (ref 2.7–4.5)
PLATELET # BLD AUTO: 268 K/UL (ref 150–350)
PMV BLD AUTO: 10.2 FL (ref 9.2–12.9)
POTASSIUM SERPL-SCNC: 4 MMOL/L (ref 3.5–5.1)
RBC # BLD AUTO: 4.43 M/UL (ref 4–5.4)
SODIUM SERPL-SCNC: 141 MMOL/L (ref 136–145)
T3 SERPL-MCNC: 102 NG/DL (ref 60–180)
T4 FREE SERPL-MCNC: 0.99 NG/DL (ref 0.71–1.51)
THYROPEROXIDASE IGG SERPL-ACNC: 571.4 IU/ML
TRIGL SERPL-MCNC: 97 MG/DL (ref 30–150)
TSH SERPL DL<=0.005 MIU/L-ACNC: 1.71 UIU/ML (ref 0.4–4)
WBC # BLD AUTO: 3.78 K/UL (ref 3.9–12.7)

## 2019-11-01 PROCEDURE — 80061 LIPID PANEL: CPT

## 2019-11-01 PROCEDURE — 84439 ASSAY OF FREE THYROXINE: CPT

## 2019-11-01 PROCEDURE — 80069 RENAL FUNCTION PANEL: CPT

## 2019-11-01 PROCEDURE — 82308 ASSAY OF CALCITONIN: CPT

## 2019-11-01 PROCEDURE — 86376 MICROSOMAL ANTIBODY EACH: CPT

## 2019-11-01 PROCEDURE — 84480 ASSAY TRIIODOTHYRONINE (T3): CPT

## 2019-11-01 PROCEDURE — 85025 COMPLETE CBC W/AUTO DIFF WBC: CPT

## 2019-11-01 PROCEDURE — 84443 ASSAY THYROID STIM HORMONE: CPT

## 2019-11-01 PROCEDURE — 84432 ASSAY OF THYROGLOBULIN: CPT

## 2019-11-04 LAB
CALCIT SERPL-MCNC: <5 PG/ML
THRYOGLOBULIN INTERPRETATION: ABNORMAL
THYROGLOB AB SERPL-ACNC: <1.8 IU/ML
THYROGLOB SERPL-MCNC: 0.3 NG/ML

## 2019-11-08 ENCOUNTER — OFFICE VISIT (OUTPATIENT)
Dept: ENDOCRINOLOGY | Facility: CLINIC | Age: 69
End: 2019-11-08
Payer: MEDICARE

## 2019-11-08 VITALS
TEMPERATURE: 98 F | SYSTOLIC BLOOD PRESSURE: 128 MMHG | HEART RATE: 57 BPM | DIASTOLIC BLOOD PRESSURE: 82 MMHG | HEIGHT: 61 IN | WEIGHT: 145.5 LBS | BODY MASS INDEX: 27.47 KG/M2

## 2019-11-08 DIAGNOSIS — Z78.0 OSTEOPENIA AFTER MENOPAUSE: ICD-10-CM

## 2019-11-08 DIAGNOSIS — E04.1 NODULAR THYROID DISEASE: ICD-10-CM

## 2019-11-08 DIAGNOSIS — E05.90 HYPERTHYROIDISM: Primary | ICD-10-CM

## 2019-11-08 DIAGNOSIS — M85.80 OSTEOPENIA AFTER MENOPAUSE: ICD-10-CM

## 2019-11-08 DIAGNOSIS — E55.9 HYPOVITAMINOSIS D: ICD-10-CM

## 2019-11-08 DIAGNOSIS — Z78.0 POSTMENOPAUSAL: ICD-10-CM

## 2019-11-08 DIAGNOSIS — E05.00 GRAVES' DISEASE: ICD-10-CM

## 2019-11-08 DIAGNOSIS — I10 HYPERTENSION, UNSPECIFIED TYPE: ICD-10-CM

## 2019-11-08 PROCEDURE — 99214 OFFICE O/P EST MOD 30 MIN: CPT | Mod: S$GLB,,, | Performed by: PHYSICIAN ASSISTANT

## 2019-11-08 PROCEDURE — 99214 PR OFFICE/OUTPT VISIT, EST, LEVL IV, 30-39 MIN: ICD-10-PCS | Mod: S$GLB,,, | Performed by: PHYSICIAN ASSISTANT

## 2019-11-08 PROCEDURE — 99999 PR PBB SHADOW E&M-EST. PATIENT-LVL IV: CPT | Mod: PBBFAC,,, | Performed by: PHYSICIAN ASSISTANT

## 2019-11-08 PROCEDURE — 99999 PR PBB SHADOW E&M-EST. PATIENT-LVL IV: ICD-10-PCS | Mod: PBBFAC,,, | Performed by: PHYSICIAN ASSISTANT

## 2019-11-08 NOTE — PROGRESS NOTES
"CC: Hyperthyroidism    HPI: Jonathan Hinton is a 69 y.o. female here for hyperthyroidism that was diagnosed several years ago along with pending conditions listed in the Visit Diagnosis. +FHx of thyroid disease in one sister and two nieces. Her dad, brother and three uncles have DM.    PMHx, PSHx: reviewed in epic.    Social Hx: no ETOH/tobacco use. She smoked > ten years ~1 pack per day. She quit in 5/18 on her own. She worked at [employer].    She has been taking tapazole for many years. Taking 5 mg tapazole daily. She has tearing and feels grittiness in her eyes.    +  sweating, hair loss, occ constipation    No fatigue, heat intolerance, diarrhea, occasional palpitations,    Eye exam: 10/2019- cataracts  No tearing or grittiness.    Last DEXA 5/18 showed osteopenia in the spine and hip. No falls this year.  No steroid injections. She is taking vitamin d and calcium.     Last thyroid u/s 5/19 showed a 1.4 cm nodule with microcalcifications in the left lobe. This was seen on u/s in 2017. No SOB,  Dysphagia. States her voice is deeper.     She goes to gym 2x week and walks (1 hour) 3x per week. Also, she has been working on the weight machines at [employer].      ROS:   Constitutional: energy has increased, wt loss.   Eyes: + blurry vision, denies double vision.   Cardiovascular: Denies current anginal symptoms  Respiratory: Denies current respiratory difficulty  Gastrointestinal: Denies recent bowel disturbances  GenitoUrinary - No dysuria  Skin: No new skin rash  Musc: legs and arms pain, knee pain  Neurologic:+numbness and tingling in hands, + headaches.    Endocrine: no polyphagia, polydipsia, polyuria  Remainder ROS negative     /82 (BP Location: Left arm, Patient Position: Sitting, BP Method: Small (Manual))   Pulse (!) 57   Temp 98.1 °F (36.7 °C) (Oral)   Ht 5' 1" (1.549 m)   Wt 66 kg (145 lb 8.1 oz)   BMI 27.49 kg/m²      Personally reviewed imaging and labs below:    Lab Results   Component " Value Date    TSH 1.709 11/01/2019    [id number] 102 11/01/2019    FREET4 0.99 11/01/2019        Chemistry        Component Value Date/Time     11/01/2019 1030    K 4.0 11/01/2019 1030     11/01/2019 1030    CO2 31 (H) 11/01/2019 1030    BUN 17 11/01/2019 1030    CREATININE 0.8 11/01/2019 1030    GLU 80 11/01/2019 1030        Component Value Date/Time    CALCIUM 10.7 (H) 11/01/2019 1030    ALKPHOS 44 (L) 05/03/2019 0950    AST 17 05/03/2019 0950    ALT 15 05/03/2019 0950    BILITOT 0.5 05/03/2019 0950    ESTGFRAFRICA >60.0 11/01/2019 1030    EGFRNONAA >60.0 11/01/2019 1030         Lab Results   Component Value Date    HGBA1C 5.3 05/10/2018      EXAMINATION:  US SOFT TISSUE HEAD NECK THYROID    CLINICAL HISTORY:  Nontoxic single thyroid nodule    TECHNIQUE:  Ultrasound of the thyroid and cervical lymph nodes was performed.    COMPARISON:  Thyroid ultrasound-05/15/2018    FINDINGS:  The right thyroid lobe measures 4.8 x 1.9 x 1.7 cm in the left thyroid lobe measures 5.0 x 2.0 x 2.1 cm.  The total thyroid weight is approximately 19.3 g.  There is a diffusely heterogeneous thyroid parenchymal echotexture present.  There are at least 2 solid nodules present within the right thyroid lobe.  There is a 11 x 9 x 7 mm well-circumscribed isoechoic nodule with internal macrocalcifications noted at the junction of the mid and lower pole of the right thyroid lobe posteriorly.  There is also a 12 x 9 x 12 mm ill-defined isoechoic nodule present posteriorly in the lower pole of the right thyroid lobe.  No associated microcalcifications.    The largest solid nodule in the left thyroid lobe measures 14 x 12 x 11 mm with in the lower pole of the left thyroid lobe.  This nodule is isoechoic and shows internal microcalcifications.  In retrospect, this nodule appears to have been present previously and appears unchanged from the previous study.  There is also a 10 x 9 x 9 mm solid nodule present within the superior pole of the  left thyroid lobe which shows internal macrocalcifications and a hypoechoic rim.    No pathologically enlarged or morphologically abnormal lymph nodes adjacent to the left or right thyroid fossa.      Impression       Multinodular thyroid gland with multiple solid thyroid nodules appreciated..  The largest nodule is solid measuring 14 mm in maximal dimension and shows internal microcalcifications.  In retrospect, this may have been present previously and appears unchanged.  This nodule represents a TI-RADS TR 4 nodule.  Given size greater than 10 mm but less than 15 mm, follow-up imaging should be performed to ensure stability.      Electronically signed by: Ej Bauer MD  Date: 05/03/2019  Time: 12:06       PE:  GENERAL: Elderly thin female, Well hydrated. NAD.  NECK: Supple neck, normal thyroid. No bruit  LYMPHATIC: No cervical or supraclavicular lymphadenopathy  CARDIOVASCULAR: Normal heart sounds, no pedal edema  RESPIRATORY: Normal effort, CTAB.  ABDOMEN: soft, non-tender, non-distended.  FEET: appropriate footwear.   Psych: appropriate mood and affect  NEURO:CN ll-Xll intact, steady gait    Assessment/Plan:   1. Hyperthyroidism  TSH    T4, free    T3    Thyroglobulin    Comprehensive metabolic panel    CBC auto differential   2. Graves' disease     3. Nodular thyroid disease  US Soft Tissue Head Neck Thyroid   4. Osteopenia after menopause  DXA Bone Density Spine And Hip   5. Hypertension, unspecified type     6. Hypovitaminosis D     7. Postmenopausal  DXA Bone Density Spine And Hip      Hyperthyroidism/Graves'-TFTs wnl. Continue methimazole 5 mg daily. Discussed treatment options. Pt wants to have an elective thyroidectomy. Start levothyroxine 100 mcg after surgery.  Nodular thyroid disease-Repeat thyroid u/s 5/20. FNA of 1.4 cm nodule.  Osteopenia-continue ca and vitamin D. Continue weight bearing exercises. Repeat DEXA scan 5/20  Usksccxmmzujpw-degnrs-xqtmwqsn zocor  Hypertension-stable-Continue  meds.  Hypovitaminosis D-check vd    F/u in 6 months

## 2019-11-18 ENCOUNTER — TELEPHONE (OUTPATIENT)
Dept: ENDOCRINOLOGY | Facility: CLINIC | Age: 69
End: 2019-11-18

## 2019-11-18 NOTE — TELEPHONE ENCOUNTER
Please reach out to patient, she wants to discuss thyroid surgery. Referral is in the computer. I could not book any appointment. Please help. Thank you

## 2019-11-21 ENCOUNTER — HOSPITAL ENCOUNTER (OUTPATIENT)
Dept: RADIOLOGY | Facility: HOSPITAL | Age: 69
Discharge: HOME | End: 2019-11-21
Attending: PHYSICIAN ASSISTANT
Payer: MEDICARE

## 2019-11-21 DIAGNOSIS — E04.1 NODULAR THYROID DISEASE: ICD-10-CM

## 2019-11-21 PROCEDURE — 76536 US EXAM OF HEAD AND NECK: CPT | Mod: TC

## 2019-11-21 PROCEDURE — 76536 US SOFT TISSUE HEAD NECK THYROID: ICD-10-PCS | Mod: 26,,, | Performed by: RADIOLOGY

## 2019-11-21 PROCEDURE — 76536 US EXAM OF HEAD AND NECK: CPT | Mod: 26,,, | Performed by: RADIOLOGY

## 2020-01-22 ENCOUNTER — OFFICE VISIT (OUTPATIENT)
Dept: SURGERY | Facility: CLINIC | Age: 70
End: 2020-01-22
Payer: MEDICARE

## 2020-01-22 VITALS
BODY MASS INDEX: 27.62 KG/M2 | DIASTOLIC BLOOD PRESSURE: 65 MMHG | SYSTOLIC BLOOD PRESSURE: 135 MMHG | HEART RATE: 59 BPM | WEIGHT: 146.19 LBS

## 2020-01-22 DIAGNOSIS — E05.90 HYPERTHYROIDISM: Primary | ICD-10-CM

## 2020-01-22 PROCEDURE — 1159F MED LIST DOCD IN RCRD: CPT | Mod: S$GLB,,, | Performed by: SURGERY

## 2020-01-22 PROCEDURE — 99999 PR PBB SHADOW E&M-EST. PATIENT-LVL III: ICD-10-PCS | Mod: PBBFAC,,, | Performed by: SURGERY

## 2020-01-22 PROCEDURE — 99999 PR PBB SHADOW E&M-EST. PATIENT-LVL III: CPT | Mod: PBBFAC,,, | Performed by: SURGERY

## 2020-01-22 PROCEDURE — 99203 OFFICE O/P NEW LOW 30 MIN: CPT | Mod: S$GLB,,, | Performed by: SURGERY

## 2020-01-22 PROCEDURE — 99203 PR OFFICE/OUTPT VISIT, NEW, LEVL III, 30-44 MIN: ICD-10-PCS | Mod: S$GLB,,, | Performed by: SURGERY

## 2020-01-22 PROCEDURE — 1126F PR PAIN SEVERITY QUANTIFIED, NO PAIN PRESENT: ICD-10-PCS | Mod: S$GLB,,, | Performed by: SURGERY

## 2020-01-22 PROCEDURE — 1159F PR MEDICATION LIST DOCUMENTED IN MEDICAL RECORD: ICD-10-PCS | Mod: S$GLB,,, | Performed by: SURGERY

## 2020-01-22 PROCEDURE — 1126F AMNT PAIN NOTED NONE PRSNT: CPT | Mod: S$GLB,,, | Performed by: SURGERY

## 2020-01-22 NOTE — PROGRESS NOTES
Subjective:       Patient ID: Jonathan Hinton is a 69 y.o. female.    Chief Complaint: Consult (thyroid)      HPI 69-year-old female followed by Endocrinology for hyperthyroidism presents to discuss thyroidectomy.  She says she is not really interested in thyroidectomy at this time.  Her hyperthyroidism is well controlled.  She had a recent ultrasound which showed numerous nodules but these were all either unchanged or smaller than on previous studies in the past 2 years.  There are no suspicious nodules.  She denies any symptoms.  She denies pain, dysphagia, or voice changes.    Past Medical History:   Diagnosis Date    Hyperlipidemia     Hypertension     Hyperthyroidism      Past Surgical History:   Procedure Laterality Date    COLONOSCOPY      WISDOM TOOTH EXTRACTION           Current Outpatient Medications:     losartan-hydrochlorothiazide 50-12.5 mg (HYZAAR) 50-12.5 mg per tablet, Take 1 tablet by mouth once daily., Disp: , Rfl:     simvastatin (ZOCOR) 40 MG tablet, simvastatin 40 mg tablet, Disp: , Rfl:     traZODone (DESYREL) 50 MG tablet, trazodone 50 mg tablet, Disp: , Rfl:     methIMAzole (TAPAZOLE) 5 MG Tab, Take 1 tablet (5 mg total) by mouth once daily., Disp: 90 tablet, Rfl: 3    Review of patient's allergies indicates:  No Known Allergies    History reviewed. No pertinent family history.  Social History     Socioeconomic History    Marital status:      Spouse name: Not on file    Number of children: Not on file    Years of education: Not on file    Highest education level: Not on file   Occupational History    Not on file   Social Needs    Financial resource strain: Not on file    Food insecurity:     Worry: Not on file     Inability: Not on file    Transportation needs:     Medical: Not on file     Non-medical: Not on file   Tobacco Use    Smoking status: Former Smoker    Smokeless tobacco: Former User   Substance and Sexual Activity    Alcohol use: Not on file    Drug use:  Not on file    Sexual activity: Not on file   Lifestyle    Physical activity:     Days per week: Not on file     Minutes per session: Not on file    Stress: Not on file   Relationships    Social connections:     Talks on phone: Not on file     Gets together: Not on file     Attends Jain service: Not on file     Active member of club or organization: Not on file     Attends meetings of clubs or organizations: Not on file     Relationship status: Not on file   Other Topics Concern    Not on file   Social History Narrative    Not on file       Review of Systems   Constitutional: Negative for activity change, chills, fever and unexpected weight change.   HENT: Negative for congestion, sore throat, trouble swallowing and voice change.    Eyes: Negative for redness and visual disturbance.   Respiratory: Negative for cough, shortness of breath and wheezing.    Cardiovascular: Negative for chest pain and palpitations.   Gastrointestinal: Negative for abdominal pain, blood in stool, nausea and vomiting.   Endocrine: Negative.    Genitourinary: Negative for dysuria, frequency and hematuria.   Musculoskeletal: Negative for arthralgias, back pain and neck pain.   Skin: Negative for rash and wound.   Allergic/Immunologic: Negative.    Neurological: Negative for dizziness, weakness and headaches.   Hematological: Negative for adenopathy.   Psychiatric/Behavioral: Negative for agitation and dysphoric mood. The patient is not nervous/anxious.      Objective:     Physical Exam   Constitutional: She is oriented to person, place, and time. She appears well-developed and well-nourished. No distress.   HENT:   Head: Normocephalic and atraumatic.   Mouth/Throat: Oropharynx is clear and moist. No oropharyngeal exudate.   Eyes: Pupils are equal, round, and reactive to light. Conjunctivae and EOM are normal. No scleral icterus.   Neck: Normal range of motion. No thyromegaly present.   No palpable dominant masses.  There is no  thyroid tenderness.  There is no adenopathy.   Cardiovascular: Normal rate and regular rhythm.   No murmur heard.  Pulmonary/Chest: Effort normal and breath sounds normal. She has no wheezes. She has no rales.   Abdominal: Soft. Bowel sounds are normal. She exhibits no distension. There is no tenderness.   Lymphadenopathy:     She has no cervical adenopathy.   Neurological: She is alert and oriented to person, place, and time. No cranial nerve deficit.   Skin: Skin is warm and dry. No rash noted. No erythema.   Psychiatric: She has a normal mood and affect. Her behavior is normal.     Assessment:     Encounter Diagnosis   Name Primary?    Hyperthyroidism Yes       Plan:      1.  Continue observation at this time.  Continue management by Endocrinology.

## 2020-01-22 NOTE — LETTER
January 22, 2020      DEEPA Centeno PA-C  [address] [address]           [facility]  [address]  [place] [address]  Phone: [phone]          Patient: Jonathan Hinton   MR Number: [id number]   YOB: 1950   Date of Visit: 1/22/2020       Dear DEEPA Centeno:    Thank you for referring Jonathan Hinton to me for evaluation. Attached you will find relevant portions of my assessment and plan of care.    If you have questions, please do not hesitate to call me. I look forward to following Jonathan Hinton along with you.    Sincerely,    Chris Marie MD    Enclosure  CC:  No Recipients    If you would like to receive this communication electronically, please contact [email or web address] or [phone] to request more information on [facility] access.    For providers and/or their staff who would like to refer a patient to [facility], please contact us through our one-stop-shop provider referral line, [facility] , at [phone].    If you feel you have received this communication in error or would no longer like to receive these types of communications, please e-mail [email or web address]

## 2020-05-01 ENCOUNTER — LAB VISIT (OUTPATIENT)
Dept: LAB | Facility: HOSPITAL | Age: 70
End: 2020-05-01
Attending: PHYSICIAN ASSISTANT
Payer: MEDICARE

## 2020-05-01 DIAGNOSIS — E05.90 HYPERTHYROIDISM: ICD-10-CM

## 2020-05-01 LAB
ALBUMIN SERPL BCP-MCNC: 4.1 G/DL (ref 3.5–5.2)
ALP SERPL-CCNC: 39 U/L (ref 55–135)
ALT SERPL W/O P-5'-P-CCNC: 18 U/L (ref 10–44)
ANION GAP SERPL CALC-SCNC: 10 MMOL/L (ref 8–16)
AST SERPL-CCNC: 21 U/L (ref 10–40)
BASOPHILS # BLD AUTO: 0.02 K/UL (ref 0–0.2)
BASOPHILS NFR BLD: 0.5 % (ref 0–1.9)
BILIRUB SERPL-MCNC: 0.6 MG/DL (ref 0.1–1)
BUN SERPL-MCNC: 20 MG/DL (ref 8–23)
CALCIUM SERPL-MCNC: 10.1 MG/DL (ref 8.7–10.5)
CHLORIDE SERPL-SCNC: 105 MMOL/L (ref 95–110)
CO2 SERPL-SCNC: 28 MMOL/L (ref 23–29)
CREAT SERPL-MCNC: 0.8 MG/DL (ref 0.5–1.4)
DIFFERENTIAL METHOD: ABNORMAL
EOSINOPHIL # BLD AUTO: 0.1 K/UL (ref 0–0.5)
EOSINOPHIL NFR BLD: 2.5 % (ref 0–8)
ERYTHROCYTE [DISTWIDTH] IN BLOOD BY AUTOMATED COUNT: 11.7 % (ref 11.5–14.5)
EST. GFR  (AFRICAN AMERICAN): >60 ML/MIN/1.73 M^2
EST. GFR  (NON AFRICAN AMERICAN): >60 ML/MIN/1.73 M^2
GLUCOSE SERPL-MCNC: 79 MG/DL (ref 70–110)
HCT VFR BLD AUTO: 43.2 % (ref 37–48.5)
HGB BLD-MCNC: 13.9 G/DL (ref 12–16)
IMM GRANULOCYTES # BLD AUTO: 0.01 K/UL (ref 0–0.04)
IMM GRANULOCYTES NFR BLD AUTO: 0.2 % (ref 0–0.5)
LYMPHOCYTES # BLD AUTO: 1.6 K/UL (ref 1–4.8)
LYMPHOCYTES NFR BLD: 38.5 % (ref 18–48)
MCH RBC QN AUTO: 33.1 PG (ref 27–31)
MCHC RBC AUTO-ENTMCNC: 32.2 G/DL (ref 32–36)
MCV RBC AUTO: 103 FL (ref 82–98)
MONOCYTES # BLD AUTO: 0.4 K/UL (ref 0.3–1)
MONOCYTES NFR BLD: 8.6 % (ref 4–15)
NEUTROPHILS # BLD AUTO: 2 K/UL (ref 1.8–7.7)
NEUTROPHILS NFR BLD: 49.7 % (ref 38–73)
NRBC BLD-RTO: 0 /100 WBC
PLATELET # BLD AUTO: 268 K/UL (ref 150–350)
PMV BLD AUTO: 10.1 FL (ref 9.2–12.9)
POTASSIUM SERPL-SCNC: 4 MMOL/L (ref 3.5–5.1)
PROT SERPL-MCNC: 7.9 G/DL (ref 6–8.4)
RBC # BLD AUTO: 4.2 M/UL (ref 4–5.4)
SODIUM SERPL-SCNC: 143 MMOL/L (ref 136–145)
T3 SERPL-MCNC: 93 NG/DL (ref 60–180)
T4 FREE SERPL-MCNC: 1.08 NG/DL (ref 0.71–1.51)
TSH SERPL DL<=0.005 MIU/L-ACNC: 1.71 UIU/ML (ref 0.4–4)
WBC # BLD AUTO: 4.08 K/UL (ref 3.9–12.7)

## 2020-05-01 PROCEDURE — 80053 COMPREHEN METABOLIC PANEL: CPT

## 2020-05-01 PROCEDURE — 84439 ASSAY OF FREE THYROXINE: CPT

## 2020-05-01 PROCEDURE — 84432 ASSAY OF THYROGLOBULIN: CPT

## 2020-05-01 PROCEDURE — 85025 COMPLETE CBC W/AUTO DIFF WBC: CPT

## 2020-05-01 PROCEDURE — 84443 ASSAY THYROID STIM HORMONE: CPT

## 2020-05-01 PROCEDURE — 84480 ASSAY TRIIODOTHYRONINE (T3): CPT

## 2020-05-01 PROCEDURE — 36415 COLL VENOUS BLD VENIPUNCTURE: CPT | Mod: PO

## 2020-05-07 ENCOUNTER — OFFICE VISIT (OUTPATIENT)
Dept: ENDOCRINOLOGY | Facility: CLINIC | Age: 70
End: 2020-05-07
Payer: MEDICARE

## 2020-05-07 DIAGNOSIS — E05.00 GRAVES' DISEASE: ICD-10-CM

## 2020-05-07 DIAGNOSIS — E53.8 VITAMIN B 12 DEFICIENCY: ICD-10-CM

## 2020-05-07 DIAGNOSIS — I10 HYPERTENSION, UNSPECIFIED TYPE: ICD-10-CM

## 2020-05-07 DIAGNOSIS — M85.80 OSTEOPENIA AFTER MENOPAUSE: ICD-10-CM

## 2020-05-07 DIAGNOSIS — E55.9 HYPOVITAMINOSIS D: ICD-10-CM

## 2020-05-07 DIAGNOSIS — Z78.0 OSTEOPENIA AFTER MENOPAUSE: ICD-10-CM

## 2020-05-07 DIAGNOSIS — E05.90 HYPERTHYROIDISM: Primary | ICD-10-CM

## 2020-05-07 DIAGNOSIS — E04.1 NODULAR THYROID DISEASE: ICD-10-CM

## 2020-05-07 PROCEDURE — 99442 PR PHYSICIAN TELEPHONE EVALUATION 11-20 MIN: CPT | Mod: 95,,, | Performed by: PHYSICIAN ASSISTANT

## 2020-05-07 PROCEDURE — 99442 PR PHYSICIAN TELEPHONE EVALUATION 11-20 MIN: ICD-10-PCS | Mod: 95,,, | Performed by: PHYSICIAN ASSISTANT

## 2020-05-07 NOTE — PROGRESS NOTES
Established Patient - Audio Only Telehealth Visit     The patient location is: Home   The chief complaint leading to consultation is: MNG/Graves' Disease  Visit type: Virtual visit with audio only (telephone)  Total time spent with patient: 15 min     The reason for the audio only service rather than synchronous audio and video virtual visit was related to technical difficulties or patient preference/necessity.     Each patient to whom I provide medical services by telemedicine is:  (1) informed of the relationship between the physician and patient and the respective role of any other health care provider with respect to management of the patient; and (2) notified that they may decline to receive medical services by telemedicine and may withdraw from such care at any time. Patient verbally consented to receive this service via voice-only telephone call.     CC: Hyperthyroidism    HPI: Jonathan Hinton is a 70 y.o. female here for hyperthyroidism that was diagnosed several years ago along with pending conditions listed in the Visit Diagnosis. +FHx of thyroid disease in one sister and two nieces. Her dad, brother and three uncles have DM.    PMHx, PSHx: reviewed in epic.    Social Hx: no ETOH/tobacco use. She smoked > ten years ~1 pack per day. She quit in 5/18 on her own. She worked at [employer].    She has been taking tapazole for many years. Taking 5 mg tapazole daily. She has tearing and feels grittiness in her eyes. She met with Dr. Marie in 1/20 and decided to forego thyroid surgery.    + sweating, occ constipation, cold intolerance    No fatigue, heat intolerance, diarrhea, hair loss,occasional palpitations,    Eye exam: 10/2019- cataracts  No tearing or grittiness.    Last DEXA 5/18 showed osteopenia in the spine and hip. No falls this year. No steroid injections. She is taking vitamin d and calcium.     Last thyroid u/s 11/19 showed a 1.4 cm nodule with microcalcifications in the left lobe. This was seen on u/s  in 2017. No SOB,  Dysphagia. States her voice is deeper.     She has been running one mile daily.     ROS:   Constitutional: energy has increased, wt loss.   Eyes: + blurry vision, denies double vision.   Cardiovascular: Denies current anginal symptoms  Respiratory: Denies current respiratory difficulty  Gastrointestinal: Denies recent bowel disturbances  GenitoUrinary - No dysuria  Skin: No new skin rash  Musc: legs and arms pain, knee pain  Neurologic:+numbness and tingling in hands, + headaches.    Endocrine: no polyphagia, polydipsia, polyuria  Remainder ROS negative     Personally reviewed imaging and labs below:    Lab Results   Component Value Date    TSH 1.706 05/01/2020    S5AUPGT 93 05/01/2020    FREET4 1.08 05/01/2020        Chemistry        Component Value Date/Time     05/01/2020 1019    K 4.0 05/01/2020 1019     05/01/2020 1019    CO2 28 05/01/2020 1019    BUN 20 05/01/2020 1019    CREATININE 0.8 05/01/2020 1019    GLU 79 05/01/2020 1019        Component Value Date/Time    CALCIUM 10.1 05/01/2020 1019    ALKPHOS 39 (L) 05/01/2020 1019    AST 21 05/01/2020 1019    ALT 18 05/01/2020 1019    BILITOT 0.6 05/01/2020 1019    ESTGFRAFRICA >60.0 05/01/2020 1019    EGFRNONAA >60.0 05/01/2020 1019         Lab Results   Component Value Date    HGBA1C 5.3 05/10/2018      EXAMINATION:  US SOFT TISSUE HEAD NECK THYROID    CLINICAL HISTORY:  Nontoxic single thyroid nodule    TECHNIQUE:  Ultrasound of the thyroid and cervical lymph nodes was performed.    COMPARISON:  Thyroid ultrasound-05/15/2018    FINDINGS:  The right thyroid lobe measures 4.8 x 1.9 x 1.7 cm in the left thyroid lobe measures 5.0 x 2.0 x 2.1 cm.  The total thyroid weight is approximately 19.3 g.  There is a diffusely heterogeneous thyroid parenchymal echotexture present.  There are at least 2 solid nodules present within the right thyroid lobe.  There is a 11 x 9 x 7 mm well-circumscribed isoechoic nodule with internal  macrocalcifications noted at the junction of the mid and lower pole of the right thyroid lobe posteriorly.  There is also a 12 x 9 x 12 mm ill-defined isoechoic nodule present posteriorly in the lower pole of the right thyroid lobe.  No associated microcalcifications.    The largest solid nodule in the left thyroid lobe measures 14 x 12 x 11 mm with in the lower pole of the left thyroid lobe.  This nodule is isoechoic and shows internal microcalcifications.  In retrospect, this nodule appears to have been present previously and appears unchanged from the previous study.  There is also a 10 x 9 x 9 mm solid nodule present within the superior pole of the left thyroid lobe which shows internal macrocalcifications and a hypoechoic rim.    No pathologically enlarged or morphologically abnormal lymph nodes adjacent to the left or right thyroid fossa.      Impression       Multinodular thyroid gland with multiple solid thyroid nodules appreciated..  The largest nodule is solid measuring 14 mm in maximal dimension and shows internal microcalcifications.  In retrospect, this may have been present previously and appears unchanged.  This nodule represents a TI-RADS TR 4 nodule.  Given size greater than 10 mm but less than 15 mm, follow-up imaging should be performed to ensure stability.      Electronically signed by: Ej Bauer MD  Date: 05/03/2019  Time: 12:06     Previous exam 11/19  PE:  GENERAL: Elderly thin female, Well hydrated. NAD.  NECK: Supple neck, normal thyroid. No bruit  LYMPHATIC: No cervical or supraclavicular lymphadenopathy  CARDIOVASCULAR: Normal heart sounds, no pedal edema  RESPIRATORY: Normal effort, CTAB.  ABDOMEN: soft, non-tender, non-distended.  FEET: appropriate footwear.   Psych: appropriate mood and affect  NEURO:CN ll-Xll intact, steady gait    Assessment/Plan:   1. Hyperthyroidism     2. Graves' disease     3. Nodular thyroid disease     4. Osteopenia after menopause     5. Hypertension,  unspecified type     6. Hypovitaminosis D        Hyperthyroidism/Graves'-TFTs wnl. Continue methimazole 5 mg daily. Discussed treatment options. Pt elects to continue methimazole.  Nodular thyroid disease-Repeat thyroid u/s 11/20. Pt declines FNA of the 1.4 cm nodule.  Osteopenia-continue ca and vitamin D. Continue weight bearing exercises. Repeat DEXA scan 5/20  Apowbqylbsaeuv-iborvq-kzlalouk zocor  Hypertension-stable-Continue meds.  Hypovitaminosis D-check vd    F/u in 6 months     This service was not originating from a related E/M service provided within the previous 7 days nor will  to an E/M service or procedure within the next 24 hours or my soonest available appointment.  Prevailing standard of care was able to be met in this audio-only visit.

## 2020-11-02 ENCOUNTER — LAB VISIT (OUTPATIENT)
Dept: LAB | Facility: HOSPITAL | Age: 70
End: 2020-11-02
Attending: PHYSICIAN ASSISTANT
Payer: MEDICARE

## 2020-11-02 DIAGNOSIS — E53.8 VITAMIN B 12 DEFICIENCY: ICD-10-CM

## 2020-11-02 DIAGNOSIS — I10 HYPERTENSION, UNSPECIFIED TYPE: ICD-10-CM

## 2020-11-02 DIAGNOSIS — E55.9 HYPOVITAMINOSIS D: ICD-10-CM

## 2020-11-02 DIAGNOSIS — E05.90 HYPERTHYROIDISM: ICD-10-CM

## 2020-11-02 DIAGNOSIS — E05.00 GRAVES' DISEASE: ICD-10-CM

## 2020-11-02 LAB
ALBUMIN SERPL BCP-MCNC: 3.9 G/DL (ref 3.5–5.2)
ALP SERPL-CCNC: 36 U/L (ref 55–135)
ALT SERPL W/O P-5'-P-CCNC: 24 U/L (ref 10–44)
ANION GAP SERPL CALC-SCNC: 10 MMOL/L (ref 8–16)
AST SERPL-CCNC: 20 U/L (ref 10–40)
BASOPHILS # BLD AUTO: 0.03 K/UL (ref 0–0.2)
BASOPHILS NFR BLD: 0.7 % (ref 0–1.9)
BILIRUB SERPL-MCNC: 0.6 MG/DL (ref 0.1–1)
BUN SERPL-MCNC: 17 MG/DL (ref 8–23)
CALCIUM SERPL-MCNC: 9.8 MG/DL (ref 8.7–10.5)
CHLORIDE SERPL-SCNC: 103 MMOL/L (ref 95–110)
CHOLEST SERPL-MCNC: 203 MG/DL (ref 120–199)
CHOLEST/HDLC SERPL: 3.2 {RATIO} (ref 2–5)
CO2 SERPL-SCNC: 30 MMOL/L (ref 23–29)
CREAT SERPL-MCNC: 0.8 MG/DL (ref 0.5–1.4)
DIFFERENTIAL METHOD: ABNORMAL
EOSINOPHIL # BLD AUTO: 0.1 K/UL (ref 0–0.5)
EOSINOPHIL NFR BLD: 2 % (ref 0–8)
ERYTHROCYTE [DISTWIDTH] IN BLOOD BY AUTOMATED COUNT: 11.6 % (ref 11.5–14.5)
EST. GFR  (AFRICAN AMERICAN): >60 ML/MIN/1.73 M^2
EST. GFR  (NON AFRICAN AMERICAN): >60 ML/MIN/1.73 M^2
GLUCOSE SERPL-MCNC: 95 MG/DL (ref 70–110)
HCT VFR BLD AUTO: 42.5 % (ref 37–48.5)
HDLC SERPL-MCNC: 64 MG/DL (ref 40–75)
HDLC SERPL: 31.5 % (ref 20–50)
HGB BLD-MCNC: 13.4 G/DL (ref 12–16)
IMM GRANULOCYTES # BLD AUTO: 0.01 K/UL (ref 0–0.04)
IMM GRANULOCYTES NFR BLD AUTO: 0.2 % (ref 0–0.5)
LDLC SERPL CALC-MCNC: 113.6 MG/DL (ref 63–159)
LYMPHOCYTES # BLD AUTO: 1.6 K/UL (ref 1–4.8)
LYMPHOCYTES NFR BLD: 39.7 % (ref 18–48)
MCH RBC QN AUTO: 32.7 PG (ref 27–31)
MCHC RBC AUTO-ENTMCNC: 31.5 G/DL (ref 32–36)
MCV RBC AUTO: 104 FL (ref 82–98)
MONOCYTES # BLD AUTO: 0.4 K/UL (ref 0.3–1)
MONOCYTES NFR BLD: 8.6 % (ref 4–15)
NEUTROPHILS # BLD AUTO: 2 K/UL (ref 1.8–7.7)
NEUTROPHILS NFR BLD: 48.8 % (ref 38–73)
NONHDLC SERPL-MCNC: 139 MG/DL
NRBC BLD-RTO: 0 /100 WBC
PLATELET # BLD AUTO: 258 K/UL (ref 150–350)
PMV BLD AUTO: 10.5 FL (ref 9.2–12.9)
POTASSIUM SERPL-SCNC: 4 MMOL/L (ref 3.5–5.1)
PROT SERPL-MCNC: 7.6 G/DL (ref 6–8.4)
RBC # BLD AUTO: 4.1 M/UL (ref 4–5.4)
SODIUM SERPL-SCNC: 143 MMOL/L (ref 136–145)
T4 FREE SERPL-MCNC: 0.8 NG/DL (ref 0.71–1.51)
TRIGL SERPL-MCNC: 127 MG/DL (ref 30–150)
TSH SERPL DL<=0.005 MIU/L-ACNC: 1.75 UIU/ML (ref 0.4–4)
WBC # BLD AUTO: 4.06 K/UL (ref 3.9–12.7)

## 2020-11-02 PROCEDURE — 80053 COMPREHEN METABOLIC PANEL: CPT

## 2020-11-02 PROCEDURE — 82306 VITAMIN D 25 HYDROXY: CPT

## 2020-11-02 PROCEDURE — 84439 ASSAY OF FREE THYROXINE: CPT

## 2020-11-02 PROCEDURE — 36415 COLL VENOUS BLD VENIPUNCTURE: CPT | Mod: PO

## 2020-11-02 PROCEDURE — 85025 COMPLETE CBC W/AUTO DIFF WBC: CPT

## 2020-11-02 PROCEDURE — 82607 VITAMIN B-12: CPT

## 2020-11-02 PROCEDURE — 84443 ASSAY THYROID STIM HORMONE: CPT

## 2020-11-02 PROCEDURE — 80061 LIPID PANEL: CPT

## 2020-11-03 LAB
25(OH)D3+25(OH)D2 SERPL-MCNC: 33 NG/ML (ref 30–96)
VIT B12 SERPL-MCNC: >2000 PG/ML (ref 210–950)

## 2020-11-09 ENCOUNTER — OFFICE VISIT (OUTPATIENT)
Dept: ENDOCRINOLOGY | Facility: CLINIC | Age: 70
End: 2020-11-09
Payer: MEDICARE

## 2020-11-09 VITALS
BODY MASS INDEX: 29.16 KG/M2 | HEART RATE: 58 BPM | WEIGHT: 154.44 LBS | HEIGHT: 61 IN | TEMPERATURE: 98 F | SYSTOLIC BLOOD PRESSURE: 120 MMHG | DIASTOLIC BLOOD PRESSURE: 84 MMHG | OXYGEN SATURATION: 97 %

## 2020-11-09 DIAGNOSIS — E53.8 VITAMIN B12 DEFICIENCY: ICD-10-CM

## 2020-11-09 DIAGNOSIS — E55.9 HYPOVITAMINOSIS D: ICD-10-CM

## 2020-11-09 DIAGNOSIS — E05.00 GRAVES' DISEASE: ICD-10-CM

## 2020-11-09 DIAGNOSIS — M85.80 OSTEOPENIA AFTER MENOPAUSE: ICD-10-CM

## 2020-11-09 DIAGNOSIS — E05.90 HYPERTHYROIDISM: Primary | ICD-10-CM

## 2020-11-09 DIAGNOSIS — Z78.0 OSTEOPENIA AFTER MENOPAUSE: ICD-10-CM

## 2020-11-09 DIAGNOSIS — I10 HYPERTENSION, UNSPECIFIED TYPE: ICD-10-CM

## 2020-11-09 DIAGNOSIS — Z78.0 POSTMENOPAUSAL: ICD-10-CM

## 2020-11-09 DIAGNOSIS — E04.1 NODULAR THYROID DISEASE: ICD-10-CM

## 2020-11-09 PROCEDURE — 99999 PR PBB SHADOW E&M-EST. PATIENT-LVL IV: ICD-10-PCS | Mod: PBBFAC,,, | Performed by: PHYSICIAN ASSISTANT

## 2020-11-09 PROCEDURE — 99214 PR OFFICE/OUTPT VISIT, EST, LEVL IV, 30-39 MIN: ICD-10-PCS | Mod: S$GLB,,, | Performed by: PHYSICIAN ASSISTANT

## 2020-11-09 PROCEDURE — 99214 OFFICE O/P EST MOD 30 MIN: CPT | Mod: S$GLB,,, | Performed by: PHYSICIAN ASSISTANT

## 2020-11-09 PROCEDURE — 99999 PR PBB SHADOW E&M-EST. PATIENT-LVL IV: CPT | Mod: PBBFAC,,, | Performed by: PHYSICIAN ASSISTANT

## 2020-11-09 RX ORDER — LISINOPRIL AND HYDROCHLOROTHIAZIDE 10; 12.5 MG/1; MG/1
TABLET ORAL
COMMUNITY
End: 2021-10-22 | Stop reason: SDUPTHER

## 2020-11-09 RX ORDER — TRAZODONE HYDROCHLORIDE 50 MG/1
TABLET ORAL
COMMUNITY
End: 2021-02-10 | Stop reason: SDUPTHER

## 2020-11-09 RX ORDER — METHIMAZOLE 5 MG/1
5 TABLET ORAL DAILY
Qty: 90 TABLET | Refills: 3 | Status: SHIPPED | OUTPATIENT
Start: 2020-11-09 | End: 2021-10-08 | Stop reason: SDUPTHER

## 2020-11-09 RX ORDER — MELOXICAM 15 MG/1
TABLET ORAL
COMMUNITY
End: 2023-03-16

## 2020-11-09 NOTE — PROGRESS NOTES
CC: Hyperthyroidism    HPI: Jonathan Hinton is a 70 y.o. female here for hyperthyroidism that was diagnosed several years ago along with pending conditions listed in the Visit Diagnosis. +FHx of thyroid disease in one sister and two nieces. Her dad, brother and three uncles have DM.    PMHx, PSHx: reviewed in epic.    Social Hx: no ETOH/tobacco use. She smoked > ten years ~1 pack per day. She quit in 5/18 on her own. She worked at [employer].    She has been taking tapazole for many years. Taking 5 mg tapazole daily. She met with Dr. Marie in 1/20 and decided to forego thyroid surgery. Occ palpitations, sweating (night), hair loss, wt gain, difficulty falling asleep.   No diarrhea/constiaption.     Eye exam: 10/2019- cataracts  No tearing or grittiness.    Last DEXA 5/18 showed osteopenia in the spine and hip. No falls this year. No steroid injections. She is taking vitamin d and calcium. She runs 3-4x times weekly.     Last thyroid u/s 11/19 showed a 1.4 cm nodule with microcalcifications in the left lobe. This was seen on u/s in 2017. No SOB,  Dysphagia. States her voice is deeper.     ROS:   Constitutional: energy has increased, wt gain (9 lbs).   Eyes: + blurry vision, denies double vision.   Cardiovascular: Denies current anginal symptoms  Respiratory: Denies current respiratory difficulty  Gastrointestinal: Denies recent bowel disturbances  GenitoUrinary - No dysuria  Skin: No new skin rash  Musc: legs and arms pain, knee pain  Neurologic:+numbness and tingling in hands, + headaches.    Endocrine: no polyphagia, polydipsia, polyuria  Remainder ROS negative     Personally reviewed imaging and labs below:    Lab Results   Component Value Date    TSH 1.747 11/02/2020    A4NALYV 93 05/01/2020    FREET4 0.80 11/02/2020        Chemistry        Component Value Date/Time     11/02/2020 0936    K 4.0 11/02/2020 0936     11/02/2020 0936    CO2 30 (H) 11/02/2020 0936    BUN 17 11/02/2020 0936    CREATININE 0.8  11/02/2020 0936    GLU 95 11/02/2020 0936        Component Value Date/Time    CALCIUM 9.8 11/02/2020 0936    ALKPHOS 36 (L) 11/02/2020 0936    AST 20 11/02/2020 0936    ALT 24 11/02/2020 0936    BILITOT 0.6 11/02/2020 0936    ESTGFRAFRICA >60.0 11/02/2020 0936    EGFRNONAA >60.0 11/02/2020 0936         Lab Results   Component Value Date    HGBA1C 5.3 05/10/2018      EXAMINATION:  US SOFT TISSUE HEAD NECK THYROID    CLINICAL HISTORY:  Nontoxic single thyroid nodule    TECHNIQUE:  Ultrasound of the thyroid and cervical lymph nodes was performed.    COMPARISON:  Thyroid ultrasound-05/15/2018    FINDINGS:  The right thyroid lobe measures 4.8 x 1.9 x 1.7 cm in the left thyroid lobe measures 5.0 x 2.0 x 2.1 cm.  The total thyroid weight is approximately 19.3 g.  There is a diffusely heterogeneous thyroid parenchymal echotexture present.  There are at least 2 solid nodules present within the right thyroid lobe.  There is a 11 x 9 x 7 mm well-circumscribed isoechoic nodule with internal macrocalcifications noted at the junction of the mid and lower pole of the right thyroid lobe posteriorly.  There is also a 12 x 9 x 12 mm ill-defined isoechoic nodule present posteriorly in the lower pole of the right thyroid lobe.  No associated microcalcifications.    The largest solid nodule in the left thyroid lobe measures 14 x 12 x 11 mm with in the lower pole of the left thyroid lobe.  This nodule is isoechoic and shows internal microcalcifications.  In retrospect, this nodule appears to have been present previously and appears unchanged from the previous study.  There is also a 10 x 9 x 9 mm solid nodule present within the superior pole of the left thyroid lobe which shows internal macrocalcifications and a hypoechoic rim.    No pathologically enlarged or morphologically abnormal lymph nodes adjacent to the left or right thyroid fossa.      Impression       Multinodular thyroid gland with multiple solid thyroid nodules appreciated..   The largest nodule is solid measuring 14 mm in maximal dimension and shows internal microcalcifications.  In retrospect, this may have been present previously and appears unchanged.  This nodule represents a TI-RADS TR 4 nodule.  Given size greater than 10 mm but less than 15 mm, follow-up imaging should be performed to ensure stability.      Electronically signed by: Ej Bauer MD  Date: 05/03/2019  Time: 12:06     PE:  GENERAL: Elderly female, Well hydrated. NAD.  NECK: Supple neck, normal thyroid. No bruit  LYMPHATIC: No cervical or supraclavicular lymphadenopathy  CARDIOVASCULAR: Normal heart sounds, no pedal edema  RESPIRATORY: Normal effort, CTAB.  ABDOMEN: soft, non-tender, non-distended.  FEET: appropriate footwear.   Psych: appropriate mood and affect  NEURO:CN ll-Xll intact, steady gait    Assessment/Plan:   1. Hyperthyroidism     2. Graves' disease     3. Nodular thyroid disease     4. Osteopenia after menopause     5. Hypertension, unspecified type     6. Hypovitaminosis D        Hyperthyroidism/Graves'-TFTs wnl. Continue methimazole 5 mg daily. Discussed treatment options. Pt elects to continue methimazole.  Nodular thyroid disease-Repeat thyroid u/s. Pt declines FNA of the 1.4 cm nodule.  Osteopenia-continue ca and vitamin D. Continue weight bearing exercises. Repeat DEXA scan.  Enobvcywqbvzfp-unpsys-rnczkjcf zocor  Hypertension-stable-Continue meds.  Hypovitaminosis W-yxhzqq-mqekpcaa vd.  Vitamin b 12 deficiency-decrease intake of vitamin b 12 to 1000 mcg qod.    F/u in 6 months

## 2020-11-12 ENCOUNTER — HOSPITAL ENCOUNTER (OUTPATIENT)
Dept: RADIOLOGY | Facility: HOSPITAL | Age: 70
Discharge: HOME | End: 2020-11-12
Attending: PHYSICIAN ASSISTANT
Payer: MEDICARE

## 2020-11-12 DIAGNOSIS — Z78.0 OSTEOPENIA AFTER MENOPAUSE: ICD-10-CM

## 2020-11-12 DIAGNOSIS — E05.90 HYPERTHYROIDISM: ICD-10-CM

## 2020-11-12 DIAGNOSIS — Z78.0 POSTMENOPAUSAL: ICD-10-CM

## 2020-11-12 DIAGNOSIS — E05.00 GRAVES' DISEASE: ICD-10-CM

## 2020-11-12 DIAGNOSIS — E04.1 NODULAR THYROID DISEASE: ICD-10-CM

## 2020-11-12 DIAGNOSIS — M85.80 OSTEOPENIA AFTER MENOPAUSE: ICD-10-CM

## 2020-11-12 PROCEDURE — 76536 US SOFT TISSUE HEAD NECK THYROID: ICD-10-PCS | Mod: 26,,, | Performed by: RADIOLOGY

## 2020-11-12 PROCEDURE — 77080 DXA BONE DENSITY AXIAL: CPT | Mod: TC

## 2020-11-12 PROCEDURE — 77080 DXA BONE DENSITY AXIAL: CPT | Mod: 26,,, | Performed by: RADIOLOGY

## 2020-11-12 PROCEDURE — 76536 US EXAM OF HEAD AND NECK: CPT | Mod: TC

## 2020-11-12 PROCEDURE — 77080 DEXA BONE DENSITY SPINE HIP: ICD-10-PCS | Mod: 26,,, | Performed by: RADIOLOGY

## 2020-11-12 PROCEDURE — 76536 US EXAM OF HEAD AND NECK: CPT | Mod: 26,,, | Performed by: RADIOLOGY

## 2021-02-10 ENCOUNTER — OFFICE VISIT (OUTPATIENT)
Dept: FAMILY MEDICINE | Facility: CLINIC | Age: 71
End: 2021-02-10
Payer: MEDICARE

## 2021-02-10 VITALS
HEART RATE: 70 BPM | WEIGHT: 150.81 LBS | HEIGHT: 61 IN | OXYGEN SATURATION: 96 % | DIASTOLIC BLOOD PRESSURE: 80 MMHG | TEMPERATURE: 98 F | BODY MASS INDEX: 28.47 KG/M2 | SYSTOLIC BLOOD PRESSURE: 122 MMHG

## 2021-02-10 DIAGNOSIS — I10 ESSENTIAL HYPERTENSION: Primary | ICD-10-CM

## 2021-02-10 DIAGNOSIS — Z12.31 BREAST CANCER SCREENING BY MAMMOGRAM: ICD-10-CM

## 2021-02-10 DIAGNOSIS — Z23 NEEDS FLU SHOT: ICD-10-CM

## 2021-02-10 DIAGNOSIS — E05.20 TOXIC MULTINODULAR GOITER: ICD-10-CM

## 2021-02-10 DIAGNOSIS — G47.9 SLEEP DISTURBANCE: ICD-10-CM

## 2021-02-10 DIAGNOSIS — M15.9 GENERALIZED OSTEOARTHRITIS: ICD-10-CM

## 2021-02-10 PROCEDURE — 99214 OFFICE O/P EST MOD 30 MIN: CPT | Mod: 25,S$GLB,, | Performed by: NURSE PRACTITIONER

## 2021-02-10 PROCEDURE — 99214 PR OFFICE/OUTPT VISIT, EST, LEVL IV, 30-39 MIN: ICD-10-PCS | Mod: 25,S$GLB,, | Performed by: NURSE PRACTITIONER

## 2021-02-10 RX ORDER — CYCLOSPORINE 0.5 MG/ML
EMULSION OPHTHALMIC
COMMUNITY
End: 2021-02-10

## 2021-02-10 RX ORDER — TRAZODONE HYDROCHLORIDE 50 MG/1
100 TABLET ORAL NIGHTLY
Qty: 180 TABLET | Refills: 1 | Status: SHIPPED | OUTPATIENT
Start: 2021-02-10 | End: 2021-10-08

## 2021-02-10 RX ORDER — DICLOFENAC SODIUM 10 MG/G
GEL TOPICAL
COMMUNITY
End: 2021-02-10

## 2021-02-11 PROCEDURE — G0008 FLU VACCINE - QUADRIVALENT - ADJUVANTED: ICD-10-PCS | Mod: S$GLB,,, | Performed by: NURSE PRACTITIONER

## 2021-02-11 PROCEDURE — 90694 FLU VACCINE - QUADRIVALENT - ADJUVANTED: ICD-10-PCS | Mod: S$GLB,,, | Performed by: NURSE PRACTITIONER

## 2021-02-11 PROCEDURE — 90694 VACC AIIV4 NO PRSRV 0.5ML IM: CPT | Mod: S$GLB,,, | Performed by: NURSE PRACTITIONER

## 2021-02-11 PROCEDURE — G0008 ADMIN INFLUENZA VIRUS VAC: HCPCS | Mod: S$GLB,,, | Performed by: NURSE PRACTITIONER

## 2021-04-01 ENCOUNTER — LAB VISIT (OUTPATIENT)
Dept: LAB | Facility: HOSPITAL | Age: 71
End: 2021-04-01
Attending: PHYSICIAN ASSISTANT
Payer: MEDICARE

## 2021-04-01 DIAGNOSIS — E05.90 HYPERTHYROIDISM: ICD-10-CM

## 2021-04-01 DIAGNOSIS — E53.8 VITAMIN B12 DEFICIENCY: ICD-10-CM

## 2021-04-01 LAB
ALBUMIN SERPL BCP-MCNC: 3.7 G/DL (ref 3.5–5.2)
ANION GAP SERPL CALC-SCNC: 8 MMOL/L (ref 8–16)
BUN SERPL-MCNC: 13 MG/DL (ref 8–23)
CALCIUM SERPL-MCNC: 8.3 MG/DL (ref 8.7–10.5)
CHLORIDE SERPL-SCNC: 104 MMOL/L (ref 95–110)
CO2 SERPL-SCNC: 30 MMOL/L (ref 23–29)
CREAT SERPL-MCNC: 0.8 MG/DL (ref 0.5–1.4)
EST. GFR  (AFRICAN AMERICAN): >60 ML/MIN/1.73 M^2
EST. GFR  (NON AFRICAN AMERICAN): >60 ML/MIN/1.73 M^2
GLUCOSE SERPL-MCNC: 95 MG/DL (ref 70–110)
PHOSPHATE SERPL-MCNC: 3.2 MG/DL (ref 2.7–4.5)
POTASSIUM SERPL-SCNC: 4.2 MMOL/L (ref 3.5–5.1)
SODIUM SERPL-SCNC: 142 MMOL/L (ref 136–145)
T4 FREE SERPL-MCNC: 0.93 NG/DL (ref 0.71–1.51)
TSH SERPL DL<=0.005 MIU/L-ACNC: 1.1 UIU/ML (ref 0.4–4)
VIT B12 SERPL-MCNC: >2000 PG/ML (ref 210–950)

## 2021-04-01 PROCEDURE — 36415 COLL VENOUS BLD VENIPUNCTURE: CPT | Mod: PO | Performed by: PHYSICIAN ASSISTANT

## 2021-04-01 PROCEDURE — 84443 ASSAY THYROID STIM HORMONE: CPT | Performed by: PHYSICIAN ASSISTANT

## 2021-04-01 PROCEDURE — 84439 ASSAY OF FREE THYROXINE: CPT | Performed by: PHYSICIAN ASSISTANT

## 2021-04-01 PROCEDURE — 80069 RENAL FUNCTION PANEL: CPT | Performed by: PHYSICIAN ASSISTANT

## 2021-04-01 PROCEDURE — 82607 VITAMIN B-12: CPT | Performed by: PHYSICIAN ASSISTANT

## 2021-04-08 ENCOUNTER — OFFICE VISIT (OUTPATIENT)
Dept: ENDOCRINOLOGY | Facility: CLINIC | Age: 71
End: 2021-04-08
Payer: MEDICARE

## 2021-04-08 VITALS
WEIGHT: 148.13 LBS | OXYGEN SATURATION: 97 % | HEIGHT: 61 IN | HEART RATE: 60 BPM | TEMPERATURE: 98 F | BODY MASS INDEX: 27.97 KG/M2 | SYSTOLIC BLOOD PRESSURE: 120 MMHG | DIASTOLIC BLOOD PRESSURE: 80 MMHG

## 2021-04-08 DIAGNOSIS — M85.80 OSTEOPENIA AFTER MENOPAUSE: ICD-10-CM

## 2021-04-08 DIAGNOSIS — Z78.0 OSTEOPENIA AFTER MENOPAUSE: ICD-10-CM

## 2021-04-08 DIAGNOSIS — Z78.0 POSTMENOPAUSAL: ICD-10-CM

## 2021-04-08 DIAGNOSIS — E53.8 VITAMIN B12 DEFICIENCY: ICD-10-CM

## 2021-04-08 DIAGNOSIS — E05.00 GRAVES' DISEASE: ICD-10-CM

## 2021-04-08 DIAGNOSIS — E04.1 NODULAR THYROID DISEASE: ICD-10-CM

## 2021-04-08 DIAGNOSIS — E05.90 HYPERTHYROIDISM: Primary | ICD-10-CM

## 2021-04-08 DIAGNOSIS — I10 HYPERTENSION, UNSPECIFIED TYPE: ICD-10-CM

## 2021-04-08 DIAGNOSIS — E55.9 HYPOVITAMINOSIS D: ICD-10-CM

## 2021-04-08 PROCEDURE — 99999 PR PBB SHADOW E&M-EST. PATIENT-LVL IV: ICD-10-PCS | Mod: PBBFAC,,, | Performed by: PHYSICIAN ASSISTANT

## 2021-04-08 PROCEDURE — 99214 PR OFFICE/OUTPT VISIT, EST, LEVL IV, 30-39 MIN: ICD-10-PCS | Mod: S$GLB,,, | Performed by: PHYSICIAN ASSISTANT

## 2021-04-08 PROCEDURE — 99999 PR PBB SHADOW E&M-EST. PATIENT-LVL IV: CPT | Mod: PBBFAC,,, | Performed by: PHYSICIAN ASSISTANT

## 2021-04-08 PROCEDURE — 99214 OFFICE O/P EST MOD 30 MIN: CPT | Mod: S$GLB,,, | Performed by: PHYSICIAN ASSISTANT

## 2021-06-07 NOTE — PROGRESS NOTES
"CC: Hyperthyroidism    HPI: Jonathan Hinton is a 68 y.o. female here for hyperthyroidism that was diagnosed several years ago along with pending conditions listed in the Visit Diagnosis. +FHx of thyroid disease in one sister and two nieces. Her dad, brother and three uncles have DM.    PMHx, PSHx: reviewed in epic.    Social Hx: no ETOH/tobacco use. She smoked > ten years ~1 pack per day. She quit in 5/18 on her own. She worked at [employer].    She has been taking tapazole for many years. Taking 5 mg tapazole daily. She has tearing and feels grittiness in her eyes.    No palpitations, heat intolerance, sweating, diarrhea/constipation,     No hair loss or thin nails.    Last DEXA 5/18 showed osteopenia in the spine and hip. No falls this year.  No steroid injections. She is taking vitamin d and calcium.   Eye exam: 10/2017- cataracts    Last thyroid u/s 5/18 showed two nodules in the right lobe that are ~1.2 cm. The dominant left sided nodule has resolved. No SOB, voice changes or dysphagia.    She goes to gym 1x week and walks. Also, she has been lifting small weights.     ROS:   Constitutional: energy has increased, wt gain.   Eyes: blurry vision, denies double vision.   Cardiovascular: Denies current anginal symptoms  Respiratory: Denies current respiratory difficulty  Gastrointestinal: Denies recent bowel disturbances  GenitoUrinary - No dysuria  Skin: No new skin rash  Musc: legs and arms pain, knee pain  Neurologic:+numbness and tingling in hands, + headaches.    Endocrine: no polyphagia, polydipsia, polyuria  Remainder ROS negative     /78 (BP Location: Left arm, Patient Position: Sitting, BP Method: Medium (Automatic))   Pulse (!) 59   Temp 98.1 °F (36.7 °C) (Oral)   Resp 16   Ht 5' 1" (1.549 m)   Wt 66.9 kg (147 lb 7.8 oz)   BMI 27.87 kg/m²      Personally reviewed imaging and labs below:      Lab Results   Component Value Date    TSH 1.664 09/10/2018    F1IKKHS 105 09/10/2018    FREET4 0.91 " 09/10/2018        Chemistry        Component Value Date/Time     09/10/2018 1509    K 3.6 09/10/2018 1509     09/10/2018 1509    CO2 26 09/10/2018 1509    BUN 15 09/10/2018 1509    CREATININE 0.7 09/10/2018 1509    GLU 78 09/10/2018 1509        Component Value Date/Time    CALCIUM 10.0 09/10/2018 1509    ALKPHOS 54 (L) 05/10/2018 1120    AST 18 05/10/2018 1120    ALT 20 05/10/2018 1120    BILITOT 0.5 05/10/2018 1120    ESTGFRAFRICA >60.0 09/10/2018 1509    EGFRNONAA >60.0 09/10/2018 1509         Lab Results   Component Value Date    HGBA1C 5.3 05/10/2018      PE:  GENERAL: Elderly female, Well hydrated. NAD.  NECK: Supple neck, normal thyroid. No bruit  LYMPHATIC: No cervical or supraclavicular lymphadenopathy  CARDIOVASCULAR: Normal heart sounds, no pedal edema  RESPIRATORY: Normal effort, CTAB.  ABDOMEN: soft, non-tender, non-distended.  FEET: appropriate footwear.   Psych: appropriate mood and affect  NEURO:CN ll-Xll intact    Assessment/Plan:   1. Graves' disease  Thyroid peroxidase antibody    CBC auto differential   2. Hyperthyroidism  T3    T4, free    Thyroglobulin    Thyroid stimulating immunoglobulin    TSH    PTH, intact    Magnesium    Phosphorus    Calcium, ionized    Comprehensive metabolic panel    methIMAzole (TAPAZOLE) 5 MG Tab    Thyroid peroxidase antibody    CBC auto differential   3. Nodular thyroid disease  Calcitonin   4. Osteopenia, unspecified location  PTH, intact    Magnesium    Phosphorus    Calcium, ionized    Comprehensive metabolic panel   5. Hyperlipidemia, unspecified hyperlipidemia type     6. Hypertension, unspecified type     7. Hypovitaminosis D  Vitamin D      Hyperthyroidism/Graves'-TFTs wnl. Continue methimazole 5 mg daily.   Nodular thyroid disease-Repeat thyroid u/s 5/19  Osteopenia-continue ca and vitamin D. Continue weight bearing exercises. Repeat DEXA scan 5/20  Lfrwphcgreqbku-pietwt-zpjoyhao zocor  Hypertension-stable-Continue meds.  Hypovitaminosis D-check  vd    F/u in 6 months   Anesthesia Type: 0.5% lidocaine with 1:100,000 epinephrine and a 1:10 solution of 8.4% sodium bicarbonate

## 2021-10-01 ENCOUNTER — HOSPITAL ENCOUNTER (OUTPATIENT)
Dept: RADIOLOGY | Facility: HOSPITAL | Age: 71
Discharge: HOME | End: 2021-10-01
Attending: PHYSICIAN ASSISTANT
Payer: MEDICARE

## 2021-10-01 DIAGNOSIS — E04.1 NODULAR THYROID DISEASE: ICD-10-CM

## 2021-10-01 PROCEDURE — 76536 US EXAM OF HEAD AND NECK: CPT | Mod: TC

## 2021-10-01 PROCEDURE — 76536 US EXAM OF HEAD AND NECK: CPT | Mod: 26,,, | Performed by: RADIOLOGY

## 2021-10-01 PROCEDURE — 76536 US SOFT TISSUE HEAD NECK THYROID: ICD-10-PCS | Mod: 26,,, | Performed by: RADIOLOGY

## 2021-10-08 ENCOUNTER — OFFICE VISIT (OUTPATIENT)
Dept: ENDOCRINOLOGY | Facility: CLINIC | Age: 71
End: 2021-10-08
Payer: MEDICARE

## 2021-10-08 VITALS
WEIGHT: 144.63 LBS | SYSTOLIC BLOOD PRESSURE: 130 MMHG | OXYGEN SATURATION: 96 % | BODY MASS INDEX: 27.3 KG/M2 | TEMPERATURE: 98 F | DIASTOLIC BLOOD PRESSURE: 84 MMHG | HEART RATE: 53 BPM | HEIGHT: 61 IN

## 2021-10-08 DIAGNOSIS — Z78.0 OSTEOPENIA AFTER MENOPAUSE: ICD-10-CM

## 2021-10-08 DIAGNOSIS — M85.80 OSTEOPENIA AFTER MENOPAUSE: ICD-10-CM

## 2021-10-08 DIAGNOSIS — E78.5 HYPERLIPIDEMIA, UNSPECIFIED HYPERLIPIDEMIA TYPE: ICD-10-CM

## 2021-10-08 DIAGNOSIS — E55.9 HYPOVITAMINOSIS D: ICD-10-CM

## 2021-10-08 DIAGNOSIS — I10 HYPERTENSION, UNSPECIFIED TYPE: ICD-10-CM

## 2021-10-08 DIAGNOSIS — E05.00 GRAVES' DISEASE: ICD-10-CM

## 2021-10-08 DIAGNOSIS — R71.8 ELEVATED MCV: ICD-10-CM

## 2021-10-08 DIAGNOSIS — E53.8 VITAMIN B 12 DEFICIENCY: ICD-10-CM

## 2021-10-08 DIAGNOSIS — E05.90 HYPERTHYROIDISM: Primary | ICD-10-CM

## 2021-10-08 DIAGNOSIS — E04.1 NODULAR THYROID DISEASE: ICD-10-CM

## 2021-10-08 PROCEDURE — 99999 PR PBB SHADOW E&M-EST. PATIENT-LVL IV: CPT | Mod: PBBFAC,,, | Performed by: PHYSICIAN ASSISTANT

## 2021-10-08 PROCEDURE — 99214 PR OFFICE/OUTPT VISIT, EST, LEVL IV, 30-39 MIN: ICD-10-PCS | Mod: S$GLB,,, | Performed by: PHYSICIAN ASSISTANT

## 2021-10-08 PROCEDURE — 99214 OFFICE O/P EST MOD 30 MIN: CPT | Mod: S$GLB,,, | Performed by: PHYSICIAN ASSISTANT

## 2021-10-08 PROCEDURE — 99999 PR PBB SHADOW E&M-EST. PATIENT-LVL IV: ICD-10-PCS | Mod: PBBFAC,,, | Performed by: PHYSICIAN ASSISTANT

## 2021-10-08 RX ORDER — METHIMAZOLE 5 MG/1
5 TABLET ORAL DAILY
Qty: 90 TABLET | Refills: 3 | Status: SHIPPED | OUTPATIENT
Start: 2021-10-08 | End: 2022-07-15

## 2021-10-12 ENCOUNTER — TELEPHONE (OUTPATIENT)
Dept: ENDOCRINOLOGY | Facility: CLINIC | Age: 71
End: 2021-10-12

## 2021-10-19 ENCOUNTER — TELEPHONE (OUTPATIENT)
Dept: FAMILY MEDICINE | Facility: CLINIC | Age: 71
End: 2021-10-19

## 2021-10-22 ENCOUNTER — OFFICE VISIT (OUTPATIENT)
Dept: FAMILY MEDICINE | Facility: CLINIC | Age: 71
End: 2021-10-22
Payer: MEDICARE

## 2021-10-22 VITALS
SYSTOLIC BLOOD PRESSURE: 130 MMHG | BODY MASS INDEX: 27.38 KG/M2 | TEMPERATURE: 98 F | DIASTOLIC BLOOD PRESSURE: 84 MMHG | HEIGHT: 61 IN | HEART RATE: 55 BPM | WEIGHT: 145 LBS | OXYGEN SATURATION: 96 % | RESPIRATION RATE: 16 BRPM

## 2021-10-22 DIAGNOSIS — I10 ESSENTIAL HYPERTENSION: ICD-10-CM

## 2021-10-22 DIAGNOSIS — E78.5 HYPERLIPIDEMIA, UNSPECIFIED HYPERLIPIDEMIA TYPE: ICD-10-CM

## 2021-10-22 DIAGNOSIS — Z23 NEED FOR IMMUNIZATION AGAINST INFLUENZA: ICD-10-CM

## 2021-10-22 DIAGNOSIS — E66.3 OVERWEIGHT (BMI 25.0-29.9): ICD-10-CM

## 2021-10-22 DIAGNOSIS — Z76.89 ENCOUNTER TO ESTABLISH CARE: Primary | ICD-10-CM

## 2021-10-22 DIAGNOSIS — E05.90 HYPERTHYROIDISM: ICD-10-CM

## 2021-10-22 PROBLEM — K56.699 STRICTURE OF SIGMOID COLON: Status: ACTIVE | Noted: 2019-04-02

## 2021-10-22 PROBLEM — I73.9 CLAUDICATION: Status: ACTIVE | Noted: 2021-10-22

## 2021-10-22 PROBLEM — M89.9 DISORDER OF BONE AND ARTICULAR CARTILAGE: Status: ACTIVE | Noted: 2019-02-26

## 2021-10-22 PROBLEM — I72.2 ANEURYSM OF RENAL ARTERY: Status: ACTIVE | Noted: 2019-04-22

## 2021-10-22 PROBLEM — M94.9 DISORDER OF BONE AND ARTICULAR CARTILAGE: Status: ACTIVE | Noted: 2019-02-26

## 2021-10-22 PROBLEM — R92.8 ABNORMAL FINDING ON MAMMOGRAPHY: Status: ACTIVE | Noted: 2021-10-22

## 2021-10-22 PROBLEM — R10.9 ABDOMINAL PAIN: Status: ACTIVE | Noted: 2019-04-02

## 2021-10-22 PROCEDURE — G0008 FLU VACCINE - QUADRIVALENT - ADJUVANTED: ICD-10-PCS | Mod: S$GLB,,, | Performed by: FAMILY MEDICINE

## 2021-10-22 PROCEDURE — 90694 FLU VACCINE - QUADRIVALENT - ADJUVANTED: ICD-10-PCS | Mod: S$GLB,,, | Performed by: FAMILY MEDICINE

## 2021-10-22 PROCEDURE — 99214 OFFICE O/P EST MOD 30 MIN: CPT | Mod: S$GLB,,, | Performed by: FAMILY MEDICINE

## 2021-10-22 PROCEDURE — 90694 VACC AIIV4 NO PRSRV 0.5ML IM: CPT | Mod: S$GLB,,, | Performed by: FAMILY MEDICINE

## 2021-10-22 PROCEDURE — 99214 PR OFFICE/OUTPT VISIT, EST, LEVL IV, 30-39 MIN: ICD-10-PCS | Mod: S$GLB,,, | Performed by: FAMILY MEDICINE

## 2021-10-22 PROCEDURE — G0008 ADMIN INFLUENZA VIRUS VAC: HCPCS | Mod: S$GLB,,, | Performed by: FAMILY MEDICINE

## 2021-10-22 RX ORDER — LISINOPRIL AND HYDROCHLOROTHIAZIDE 10; 12.5 MG/1; MG/1
1 TABLET ORAL DAILY
Qty: 90 TABLET | Refills: 3 | Status: SHIPPED | OUTPATIENT
Start: 2021-10-22 | End: 2022-10-13

## 2021-10-22 RX ORDER — LISINOPRIL AND HYDROCHLOROTHIAZIDE 10; 12.5 MG/1; MG/1
TABLET ORAL
Qty: 90 TABLET | Refills: 3 | Status: CANCELLED | OUTPATIENT
Start: 2021-10-22

## 2021-10-22 RX ORDER — SIMVASTATIN 40 MG/1
TABLET, FILM COATED ORAL
Qty: 90 TABLET | Refills: 1 | Status: CANCELLED | OUTPATIENT
Start: 2021-10-22

## 2021-10-22 RX ORDER — SIMVASTATIN 40 MG/1
40 TABLET, FILM COATED ORAL NIGHTLY
Qty: 90 TABLET | Refills: 3 | Status: SHIPPED | OUTPATIENT
Start: 2021-10-22 | End: 2022-10-26

## 2021-11-12 ENCOUNTER — LAB VISIT (OUTPATIENT)
Dept: LAB | Facility: HOSPITAL | Age: 71
End: 2021-11-12
Attending: PHYSICIAN ASSISTANT
Payer: MEDICARE

## 2021-11-12 DIAGNOSIS — E05.90 HYPERTHYROIDISM: ICD-10-CM

## 2021-11-12 LAB
BASOPHILS # BLD AUTO: 0.02 K/UL (ref 0–0.2)
BASOPHILS NFR BLD: 0.4 % (ref 0–1.9)
DIFFERENTIAL METHOD: ABNORMAL
EOSINOPHIL # BLD AUTO: 0.1 K/UL (ref 0–0.5)
EOSINOPHIL NFR BLD: 1.3 % (ref 0–8)
ERYTHROCYTE [DISTWIDTH] IN BLOOD BY AUTOMATED COUNT: 11.1 % (ref 11.5–14.5)
HCT VFR BLD AUTO: 44 % (ref 37–48.5)
HGB BLD-MCNC: 14.2 G/DL (ref 12–16)
IMM GRANULOCYTES # BLD AUTO: 0.03 K/UL (ref 0–0.04)
IMM GRANULOCYTES NFR BLD AUTO: 0.7 % (ref 0–0.5)
LYMPHOCYTES # BLD AUTO: 1.5 K/UL (ref 1–4.8)
LYMPHOCYTES NFR BLD: 33.1 % (ref 18–48)
MCH RBC QN AUTO: 32.1 PG (ref 27–31)
MCHC RBC AUTO-ENTMCNC: 32.3 G/DL (ref 32–36)
MCV RBC AUTO: 99 FL (ref 82–98)
MONOCYTES # BLD AUTO: 0.4 K/UL (ref 0.3–1)
MONOCYTES NFR BLD: 7.6 % (ref 4–15)
NEUTROPHILS # BLD AUTO: 2.6 K/UL (ref 1.8–7.7)
NEUTROPHILS NFR BLD: 56.9 % (ref 38–73)
NRBC BLD-RTO: 0 /100 WBC
PLATELET # BLD AUTO: 267 K/UL (ref 150–450)
PMV BLD AUTO: 10.4 FL (ref 9.2–12.9)
RBC # BLD AUTO: 4.43 M/UL (ref 4–5.4)
WBC # BLD AUTO: 4.59 K/UL (ref 3.9–12.7)

## 2021-11-12 PROCEDURE — 85025 COMPLETE CBC W/AUTO DIFF WBC: CPT | Performed by: PHYSICIAN ASSISTANT

## 2021-11-12 PROCEDURE — 36415 COLL VENOUS BLD VENIPUNCTURE: CPT | Mod: PO | Performed by: PHYSICIAN ASSISTANT

## 2021-11-15 RX ORDER — TRAZODONE HYDROCHLORIDE 50 MG/1
2 TABLET ORAL NIGHTLY
COMMUNITY
End: 2021-11-15 | Stop reason: SDUPTHER

## 2021-11-15 RX ORDER — TRAZODONE HYDROCHLORIDE 50 MG/1
100 TABLET ORAL NIGHTLY
Qty: 180 TABLET | Refills: 1 | Status: SHIPPED | OUTPATIENT
Start: 2021-11-15 | End: 2022-05-12

## 2022-04-01 ENCOUNTER — LAB VISIT (OUTPATIENT)
Dept: LAB | Facility: HOSPITAL | Age: 72
End: 2022-04-01
Attending: PHYSICIAN ASSISTANT
Payer: MEDICARE

## 2022-04-01 DIAGNOSIS — E05.90 HYPERTHYROIDISM: ICD-10-CM

## 2022-04-01 DIAGNOSIS — E55.9 HYPOVITAMINOSIS D: ICD-10-CM

## 2022-04-01 LAB
25(OH)D3+25(OH)D2 SERPL-MCNC: 39 NG/ML (ref 30–96)
BASOPHILS # BLD AUTO: 0.01 K/UL (ref 0–0.2)
BASOPHILS NFR BLD: 0.3 % (ref 0–1.9)
DIFFERENTIAL METHOD: ABNORMAL
EOSINOPHIL # BLD AUTO: 0.1 K/UL (ref 0–0.5)
EOSINOPHIL NFR BLD: 1.6 % (ref 0–8)
ERYTHROCYTE [DISTWIDTH] IN BLOOD BY AUTOMATED COUNT: 11.6 % (ref 11.5–14.5)
HCT VFR BLD AUTO: 39.6 % (ref 37–48.5)
HGB BLD-MCNC: 12.9 G/DL (ref 12–16)
IMM GRANULOCYTES # BLD AUTO: 0 K/UL (ref 0–0.04)
IMM GRANULOCYTES NFR BLD AUTO: 0 % (ref 0–0.5)
LYMPHOCYTES # BLD AUTO: 1.5 K/UL (ref 1–4.8)
LYMPHOCYTES NFR BLD: 39.1 % (ref 18–48)
MCH RBC QN AUTO: 33.1 PG (ref 27–31)
MCHC RBC AUTO-ENTMCNC: 32.6 G/DL (ref 32–36)
MCV RBC AUTO: 102 FL (ref 82–98)
MONOCYTES # BLD AUTO: 0.3 K/UL (ref 0.3–1)
MONOCYTES NFR BLD: 9 % (ref 4–15)
NEUTROPHILS # BLD AUTO: 1.9 K/UL (ref 1.8–7.7)
NEUTROPHILS NFR BLD: 50 % (ref 38–73)
NRBC BLD-RTO: 0 /100 WBC
PLATELET # BLD AUTO: 268 K/UL (ref 150–450)
PMV BLD AUTO: 10.5 FL (ref 9.2–12.9)
RBC # BLD AUTO: 3.9 M/UL (ref 4–5.4)
T4 FREE SERPL-MCNC: 0.85 NG/DL (ref 0.71–1.51)
TSH SERPL DL<=0.005 MIU/L-ACNC: 1.39 UIU/ML (ref 0.4–4)
WBC # BLD AUTO: 3.79 K/UL (ref 3.9–12.7)

## 2022-04-01 PROCEDURE — 36415 COLL VENOUS BLD VENIPUNCTURE: CPT | Mod: PO | Performed by: PHYSICIAN ASSISTANT

## 2022-04-01 PROCEDURE — 85025 COMPLETE CBC W/AUTO DIFF WBC: CPT | Performed by: PHYSICIAN ASSISTANT

## 2022-04-01 PROCEDURE — 84439 ASSAY OF FREE THYROXINE: CPT | Performed by: PHYSICIAN ASSISTANT

## 2022-04-01 PROCEDURE — 82306 VITAMIN D 25 HYDROXY: CPT | Performed by: PHYSICIAN ASSISTANT

## 2022-04-01 PROCEDURE — 84443 ASSAY THYROID STIM HORMONE: CPT | Performed by: PHYSICIAN ASSISTANT

## 2022-04-08 ENCOUNTER — OFFICE VISIT (OUTPATIENT)
Dept: ENDOCRINOLOGY | Facility: CLINIC | Age: 72
End: 2022-04-08
Payer: MEDICARE

## 2022-04-08 ENCOUNTER — LAB VISIT (OUTPATIENT)
Dept: LAB | Facility: HOSPITAL | Age: 72
End: 2022-04-08
Attending: PHYSICIAN ASSISTANT
Payer: MEDICARE

## 2022-04-08 ENCOUNTER — TELEPHONE (OUTPATIENT)
Dept: ENDOCRINOLOGY | Facility: CLINIC | Age: 72
End: 2022-04-08

## 2022-04-08 VITALS
OXYGEN SATURATION: 95 % | SYSTOLIC BLOOD PRESSURE: 120 MMHG | WEIGHT: 139.31 LBS | BODY MASS INDEX: 26.3 KG/M2 | HEART RATE: 57 BPM | TEMPERATURE: 98 F | DIASTOLIC BLOOD PRESSURE: 70 MMHG | HEIGHT: 61 IN

## 2022-04-08 DIAGNOSIS — D52.0 DIETARY FOLATE DEFICIENCY ANEMIA: ICD-10-CM

## 2022-04-08 DIAGNOSIS — M85.80 OSTEOPENIA AFTER MENOPAUSE: ICD-10-CM

## 2022-04-08 DIAGNOSIS — E55.9 HYPOVITAMINOSIS D: ICD-10-CM

## 2022-04-08 DIAGNOSIS — E04.1 NODULAR THYROID DISEASE: ICD-10-CM

## 2022-04-08 DIAGNOSIS — R71.8 ELEVATED MCV: ICD-10-CM

## 2022-04-08 DIAGNOSIS — E78.5 HYPERLIPIDEMIA, UNSPECIFIED HYPERLIPIDEMIA TYPE: ICD-10-CM

## 2022-04-08 DIAGNOSIS — Z78.0 POSTMENOPAUSAL: ICD-10-CM

## 2022-04-08 DIAGNOSIS — E05.90 HYPERTHYROIDISM: Primary | ICD-10-CM

## 2022-04-08 DIAGNOSIS — Z78.0 OSTEOPENIA AFTER MENOPAUSE: ICD-10-CM

## 2022-04-08 DIAGNOSIS — E05.00 GRAVES' DISEASE: ICD-10-CM

## 2022-04-08 DIAGNOSIS — I10 HYPERTENSION, UNSPECIFIED TYPE: ICD-10-CM

## 2022-04-08 DIAGNOSIS — E53.8 VITAMIN B 12 DEFICIENCY: ICD-10-CM

## 2022-04-08 LAB — FOLATE SERPL-MCNC: 14.7 NG/ML (ref 4–24)

## 2022-04-08 PROCEDURE — 1160F PR REVIEW ALL MEDS BY PRESCRIBER/CLIN PHARMACIST DOCUMENTED: ICD-10-PCS | Mod: CPTII,S$GLB,, | Performed by: PHYSICIAN ASSISTANT

## 2022-04-08 PROCEDURE — 3078F DIAST BP <80 MM HG: CPT | Mod: CPTII,S$GLB,, | Performed by: PHYSICIAN ASSISTANT

## 2022-04-08 PROCEDURE — 1126F AMNT PAIN NOTED NONE PRSNT: CPT | Mod: CPTII,S$GLB,, | Performed by: PHYSICIAN ASSISTANT

## 2022-04-08 PROCEDURE — 3074F PR MOST RECENT SYSTOLIC BLOOD PRESSURE < 130 MM HG: ICD-10-PCS | Mod: CPTII,S$GLB,, | Performed by: PHYSICIAN ASSISTANT

## 2022-04-08 PROCEDURE — 3074F SYST BP LT 130 MM HG: CPT | Mod: CPTII,S$GLB,, | Performed by: PHYSICIAN ASSISTANT

## 2022-04-08 PROCEDURE — 1159F PR MEDICATION LIST DOCUMENTED IN MEDICAL RECORD: ICD-10-PCS | Mod: CPTII,S$GLB,, | Performed by: PHYSICIAN ASSISTANT

## 2022-04-08 PROCEDURE — 4010F ACE/ARB THERAPY RXD/TAKEN: CPT | Mod: CPTII,S$GLB,, | Performed by: PHYSICIAN ASSISTANT

## 2022-04-08 PROCEDURE — 3288F FALL RISK ASSESSMENT DOCD: CPT | Mod: CPTII,S$GLB,, | Performed by: PHYSICIAN ASSISTANT

## 2022-04-08 PROCEDURE — 1160F RVW MEDS BY RX/DR IN RCRD: CPT | Mod: CPTII,S$GLB,, | Performed by: PHYSICIAN ASSISTANT

## 2022-04-08 PROCEDURE — 3008F PR BODY MASS INDEX (BMI) DOCUMENTED: ICD-10-PCS | Mod: CPTII,S$GLB,, | Performed by: PHYSICIAN ASSISTANT

## 2022-04-08 PROCEDURE — 1159F MED LIST DOCD IN RCRD: CPT | Mod: CPTII,S$GLB,, | Performed by: PHYSICIAN ASSISTANT

## 2022-04-08 PROCEDURE — 99213 OFFICE O/P EST LOW 20 MIN: CPT | Mod: S$GLB,,, | Performed by: PHYSICIAN ASSISTANT

## 2022-04-08 PROCEDURE — 1101F PR PT FALLS ASSESS DOC 0-1 FALLS W/OUT INJ PAST YR: ICD-10-PCS | Mod: CPTII,S$GLB,, | Performed by: PHYSICIAN ASSISTANT

## 2022-04-08 PROCEDURE — 1126F PR PAIN SEVERITY QUANTIFIED, NO PAIN PRESENT: ICD-10-PCS | Mod: CPTII,S$GLB,, | Performed by: PHYSICIAN ASSISTANT

## 2022-04-08 PROCEDURE — 3008F BODY MASS INDEX DOCD: CPT | Mod: CPTII,S$GLB,, | Performed by: PHYSICIAN ASSISTANT

## 2022-04-08 PROCEDURE — 99999 PR PBB SHADOW E&M-EST. PATIENT-LVL IV: CPT | Mod: PBBFAC,,, | Performed by: PHYSICIAN ASSISTANT

## 2022-04-08 PROCEDURE — 99213 PR OFFICE/OUTPT VISIT, EST, LEVL III, 20-29 MIN: ICD-10-PCS | Mod: S$GLB,,, | Performed by: PHYSICIAN ASSISTANT

## 2022-04-08 PROCEDURE — 82746 ASSAY OF FOLIC ACID SERUM: CPT | Performed by: PHYSICIAN ASSISTANT

## 2022-04-08 PROCEDURE — 1101F PT FALLS ASSESS-DOCD LE1/YR: CPT | Mod: CPTII,S$GLB,, | Performed by: PHYSICIAN ASSISTANT

## 2022-04-08 PROCEDURE — 3288F PR FALLS RISK ASSESSMENT DOCUMENTED: ICD-10-PCS | Mod: CPTII,S$GLB,, | Performed by: PHYSICIAN ASSISTANT

## 2022-04-08 PROCEDURE — 99999 PR PBB SHADOW E&M-EST. PATIENT-LVL IV: ICD-10-PCS | Mod: PBBFAC,,, | Performed by: PHYSICIAN ASSISTANT

## 2022-04-08 PROCEDURE — 36415 COLL VENOUS BLD VENIPUNCTURE: CPT | Mod: PO | Performed by: PHYSICIAN ASSISTANT

## 2022-04-08 PROCEDURE — 4010F PR ACE/ARB THEARPY RXD/TAKEN: ICD-10-PCS | Mod: CPTII,S$GLB,, | Performed by: PHYSICIAN ASSISTANT

## 2022-04-08 PROCEDURE — 3078F PR MOST RECENT DIASTOLIC BLOOD PRESSURE < 80 MM HG: ICD-10-PCS | Mod: CPTII,S$GLB,, | Performed by: PHYSICIAN ASSISTANT

## 2022-04-08 NOTE — PROGRESS NOTES
CC: Hyperthyroidism    HPI: Jonathan Hinton is a 72 y.o. female here for hyperthyroidism that was diagnosed several years ago along with pending conditions listed in the Visit Diagnosis. +FHx of thyroid disease in one sister and two nieces. Her dad, brother and three uncles have DM. No hx of neck radiation.    PMHx, PSHx: reviewed in epic.    Social Hx: no ETOH/tobacco use. She smoked > ten years ~1 pack per day. She quit in 5/18 on her own. She worked at [employer].    She has been taking tapazole for many years. Taking 5 mg tapazole daily. She met with Dr. Marie in 1/20 and decided to forego thyroid surgery. Occ palpitations, sweating (night), hair loss, wt loss (intentional).   No diarrhea/constiaption, mental fogginess.     Eye exam: 10/21- cataracts  + tearing and grittiness in eyes.     Last DEXA 11/20 was wnl. This improved from osteopenia the previous exam. No falls this year. No steroid injections. She is taking vitamin d and calcium. She runs 3-4x times weekly. Taking 500 mg of Vitamin B12 daily and folic acid.    Last thyroid u/s 10/21 shows all nodules have grown in size and meet FNA criteria. No SOB,  Dysphagia. States her voice is deeper.     Wt Readings from Last 6 Encounters:   04/08/22 63.2 kg (139 lb 5.3 oz)   10/22/21 65.8 kg (145 lb)   10/08/21 65.6 kg (144 lb 10 oz)   04/08/21 67.2 kg (148 lb 2.4 oz)   02/10/21 68.4 kg (150 lb 12.8 oz)   11/09/20 70.1 kg (154 lb 6.9 oz)      ROS:   Constitutional: energy has increased, wt loss (10 lbs since 11/20).   Eyes: + blurry vision, denies double vision.   Cardiovascular: Denies current anginal symptoms  Respiratory: Denies current respiratory difficulty  Gastrointestinal: Denies recent bowel disturbances  GenitoUrinary - No dysuria  Skin: No new skin rash  Musc: legs and arms pain, knee pain  Neurologic:+numbness and tingling in hands, + headaches.    Endocrine: no polyphagia, polydipsia, polyuria  Remainder ROS negative     Personally reviewed imaging and  labs below:    Lab Results   Component Value Date    TSH 1.394 04/01/2022    A1KOZLP 93 05/01/2020    FREET4 0.85 04/01/2022        Chemistry        Component Value Date/Time     10/01/2021 0801    K 3.8 10/01/2021 0801     10/01/2021 0801    CO2 29 10/01/2021 0801    BUN 14 10/01/2021 0801    CREATININE 0.9 10/01/2021 0801    GLU 99 10/01/2021 0801        Component Value Date/Time    CALCIUM 9.9 10/01/2021 0801    ALKPHOS 45 (L) 10/01/2021 0801    AST 19 10/01/2021 0801    ALT 18 10/01/2021 0801    BILITOT 0.4 10/01/2021 0801    ESTGFRAFRICA >60 10/01/2021 0801    EGFRNONAA >60 10/01/2021 0801         Lab Results   Component Value Date    HGBA1C 5.3 05/10/2018      EXAMINATION:  US SOFT TISSUE HEAD NECK THYROID     CLINICAL HISTORY:  Nontoxic single thyroid nodule     TECHNIQUE:  Ultrasound of the thyroid and cervical lymph nodes was performed.     COMPARISON:  11/12/2020     FINDINGS:  The right hemithyroid measures 4.8 x 1.7 x 2.0 cm and the left 4.8 x 1.9 x 2.7 cm.  The thyroid isthmus measures 3 mm.  Total thyroid volume is 21 cc.     The thyroid parenchyma is heterogeneous without hypervascularity.     Two solid isoechoic nodules are present within the right mid to lower pole, measuring 1.6 cm in the midpole and 1.6 cm in the lower pole, previously measured at 1.3 and 1.0 cm, respectively.  On the left, there is a 2.4 cm isoechoic nodule of the lower pole, previously 1.7 cm.  There is also heterogeneous nodularity throughout the lower pole measuring approximately 2.7 cm, without distinct correlate on prior.     No evidence for cervical lymphadenopathy.     Impression:     Multinodular thyroid gland, with interval enlargement of nodules bilaterally as described above.        Electronically signed by: Chris Foy MD  Date:                                            10/01/2021  Time:                                           09:29                PE:  GENERAL: Elderly female, Well hydrated.  NAD.  NECK: Supple neck, normal thyroid. No bruit  LYMPHATIC: No cervical or supraclavicular lymphadenopathy  CARDIOVASCULAR: Normal heart sounds, no pedal edema  RESPIRATORY: Normal effort, CTAB.  Psych: appropriate mood and affect  NEURO:CN ll-Xll intact, steady gait    Assessment/Plan:   1. Hyperthyroidism  CBC Auto Differential    TSH    T4, Free    Lipid Panel    Comprehensive Metabolic Panel   2. Graves' disease     3. Osteopenia after menopause  DXA Bone Density Spine And Hip   4. Hyperlipidemia, unspecified hyperlipidemia type     5. Hypertension, unspecified type     6. Hypovitaminosis D     7. Vitamin B 12 deficiency     8. Nodular thyroid disease  US Soft Tissue Head Neck Thyroid   9. Elevated MCV  FOLATE   10. Dietary folate deficiency anemia   FOLATE   11. Postmenopausal  DXA Bone Density Spine And Hip      Hyperthyroidism/Graves'-TFTs wnl. Continue methimazole 5 mg daily. Discussed treatment options. Pt elects to have a thyroidectomy. Declines u/s.  Nodular thyroid disease-thyroid nodules have increased in size bl. Pt declines FNA. Referral to Surgery.  Osteopenia-continue ca and vitamin D. Continue weight bearing exercises. Repeat DEXA scan 11/22.  Rybxbcilfbojwh-phssff-oacmrxrv zocor  Hypertension-stable-Continue meds.  Hypovitaminosis I-mbmtms-agahfmat vd.  Vitamin b 12 deficiency-elevated-stop intake of vitamin b 12.  Elevated MCV-start folic acid 400 mcg daily.     Referral to ENT  Folate today  F/u in 4 months w/ labs prior and dexa

## 2022-04-14 ENCOUNTER — TELEPHONE (OUTPATIENT)
Dept: ENDOCRINOLOGY | Facility: CLINIC | Age: 72
End: 2022-04-14
Payer: MEDICARE

## 2022-04-14 NOTE — TELEPHONE ENCOUNTER
"Attempted to call patient, no ans, lvm with info per Peyton Centeno PA-C below:  "Your folate is normal."     "

## 2022-04-14 NOTE — TELEPHONE ENCOUNTER
----- Message from DEEPA Centeno PA-C sent at 4/12/2022 11:14 PM CDT -----  Your folate is normal.

## 2022-05-17 ENCOUNTER — OFFICE VISIT (OUTPATIENT)
Dept: OTOLARYNGOLOGY | Facility: CLINIC | Age: 72
End: 2022-05-17
Payer: MEDICARE

## 2022-05-17 VITALS — WEIGHT: 136.44 LBS | BODY MASS INDEX: 25.78 KG/M2

## 2022-05-17 DIAGNOSIS — E05.90 HYPERTHYROIDISM: Primary | ICD-10-CM

## 2022-05-17 DIAGNOSIS — E04.1 NODULAR THYROID DISEASE: ICD-10-CM

## 2022-05-17 DIAGNOSIS — E55.9 HYPOVITAMINOSIS D: ICD-10-CM

## 2022-05-17 PROCEDURE — 99204 PR OFFICE/OUTPT VISIT, NEW, LEVL IV, 45-59 MIN: ICD-10-PCS | Mod: 25,S$GLB,, | Performed by: OTOLARYNGOLOGY

## 2022-05-17 PROCEDURE — 1160F PR REVIEW ALL MEDS BY PRESCRIBER/CLIN PHARMACIST DOCUMENTED: ICD-10-PCS | Mod: CPTII,S$GLB,, | Performed by: OTOLARYNGOLOGY

## 2022-05-17 PROCEDURE — 99999 PR PBB SHADOW E&M-EST. PATIENT-LVL IV: ICD-10-PCS | Mod: PBBFAC,,, | Performed by: OTOLARYNGOLOGY

## 2022-05-17 PROCEDURE — 1101F PT FALLS ASSESS-DOCD LE1/YR: CPT | Mod: CPTII,S$GLB,, | Performed by: OTOLARYNGOLOGY

## 2022-05-17 PROCEDURE — 1159F PR MEDICATION LIST DOCUMENTED IN MEDICAL RECORD: ICD-10-PCS | Mod: CPTII,S$GLB,, | Performed by: OTOLARYNGOLOGY

## 2022-05-17 PROCEDURE — 4010F ACE/ARB THERAPY RXD/TAKEN: CPT | Mod: CPTII,S$GLB,, | Performed by: OTOLARYNGOLOGY

## 2022-05-17 PROCEDURE — 1160F RVW MEDS BY RX/DR IN RCRD: CPT | Mod: CPTII,S$GLB,, | Performed by: OTOLARYNGOLOGY

## 2022-05-17 PROCEDURE — 31575 PR LARYNGOSCOPY, FLEXIBLE; DIAGNOSTIC: ICD-10-PCS | Mod: S$GLB,,, | Performed by: OTOLARYNGOLOGY

## 2022-05-17 PROCEDURE — 99204 OFFICE O/P NEW MOD 45 MIN: CPT | Mod: 25,S$GLB,, | Performed by: OTOLARYNGOLOGY

## 2022-05-17 PROCEDURE — 3008F PR BODY MASS INDEX (BMI) DOCUMENTED: ICD-10-PCS | Mod: CPTII,S$GLB,, | Performed by: OTOLARYNGOLOGY

## 2022-05-17 PROCEDURE — 1126F AMNT PAIN NOTED NONE PRSNT: CPT | Mod: CPTII,S$GLB,, | Performed by: OTOLARYNGOLOGY

## 2022-05-17 PROCEDURE — 4010F PR ACE/ARB THEARPY RXD/TAKEN: ICD-10-PCS | Mod: CPTII,S$GLB,, | Performed by: OTOLARYNGOLOGY

## 2022-05-17 PROCEDURE — 3288F FALL RISK ASSESSMENT DOCD: CPT | Mod: CPTII,S$GLB,, | Performed by: OTOLARYNGOLOGY

## 2022-05-17 PROCEDURE — 3288F PR FALLS RISK ASSESSMENT DOCUMENTED: ICD-10-PCS | Mod: CPTII,S$GLB,, | Performed by: OTOLARYNGOLOGY

## 2022-05-17 PROCEDURE — 31575 DIAGNOSTIC LARYNGOSCOPY: CPT | Mod: S$GLB,,, | Performed by: OTOLARYNGOLOGY

## 2022-05-17 PROCEDURE — 99999 PR PBB SHADOW E&M-EST. PATIENT-LVL IV: CPT | Mod: PBBFAC,,, | Performed by: OTOLARYNGOLOGY

## 2022-05-17 PROCEDURE — 1101F PR PT FALLS ASSESS DOC 0-1 FALLS W/OUT INJ PAST YR: ICD-10-PCS | Mod: CPTII,S$GLB,, | Performed by: OTOLARYNGOLOGY

## 2022-05-17 PROCEDURE — 3008F BODY MASS INDEX DOCD: CPT | Mod: CPTII,S$GLB,, | Performed by: OTOLARYNGOLOGY

## 2022-05-17 PROCEDURE — 1126F PR PAIN SEVERITY QUANTIFIED, NO PAIN PRESENT: ICD-10-PCS | Mod: CPTII,S$GLB,, | Performed by: OTOLARYNGOLOGY

## 2022-05-17 PROCEDURE — 1159F MED LIST DOCD IN RCRD: CPT | Mod: CPTII,S$GLB,, | Performed by: OTOLARYNGOLOGY

## 2022-05-17 RX ORDER — HYDROCODONE BITARTRATE AND ACETAMINOPHEN 10; 325 MG/1; MG/1
1 TABLET ORAL EVERY 6 HOURS PRN
COMMUNITY
Start: 2021-12-13 | End: 2022-07-12

## 2022-05-17 RX ORDER — POTASSIUM IODIDE 1 G/ML
SOLUTION ORAL
Qty: 30 ML | Refills: 0 | Status: CANCELLED | OUTPATIENT
Start: 2022-05-17

## 2022-05-17 NOTE — PROGRESS NOTES
Subjective:       Patient ID: Jonathan Hinton is a 72 y.o. female.    Chief Complaint: Thyroid Problem (Thyroid nodule disease)      Jonathan is here for Multinodular thyroid goiter and hyperthyroidism. Initially diagnosed: years ago. Has been managed on Tapazole for yrs. She has recently demonstrated incr nodule size and here to discuss surgery.  Characteristics of symptoms: no pain, mild dysphagia, yes odynophagia, no compressive symptoms, she does have raspy quality to voice.  She declines bx.    no personal history of radiation      Social History     Tobacco Use   Smoking Status Former Smoker   Smokeless Tobacco Former User     Social History     Substance and Sexual Activity   Alcohol Use None        Objective:        Constitutional:   She is oriented to person, place, and time. She appears well-developed and well-nourished. She appears alert. She is active. Normal speech.      Head:  Normocephalic and atraumatic. Head is without TMJ tenderness. No scars. Salivary glands normal.  Facial strength is normal.      Ears:    Right Ear: No drainage or swelling. No middle ear effusion.   Left Ear: No drainage or swelling.  No middle ear effusion.     Nose:  No mucosal edema, rhinorrhea or sinus tenderness. No turbinate hypertrophy.      Mouth/Throat  Oropharynx clear and moist without lesions or asymmetry, normal uvula midline and mirror exam normal. Normal dentition. No uvula swelling, lacerations or trismus. No oropharyngeal exudate. Tonsillar erythema, tonsillar exudate.    Hyperactive gag reflux. Unable to visualize cords on mirror laryngoscopy.        Neck:  Full range of motion with neck supple and no adenopathy. Thyroid tenderness is present. No tracheal deviation, no edema, no erythema, normal range of motion, no stridor, no crepitus and no neck rigidity present. No thyroid mass present.     Cardiovascular:   Intact distal pulses and normal pulses.      Pulmonary/Chest:   Effort normal and breath sounds  normal. No stridor.     Psychiatric:   Her speech is normal and behavior is normal. Her mood appears not anxious. Her affect is not labile.     Neurological:   She is alert and oriented to person, place, and time. No sensory deficit.     Skin:   No abrasions, lacerations, lesions, or rashes. No abrasion and no bruising noted.         Tests / Results:  I personally reviewed the following imaging studies and my impression is:  neck ultrasound:     FINDINGS:  The right hemithyroid measures 4.8 x 1.7 x 2.0 cm and the left 4.8 x 1.9 x 2.7 cm.  The thyroid isthmus measures 3 mm.  Total thyroid volume is 21 cc.     The thyroid parenchyma is heterogeneous without hypervascularity.     Two solid isoechoic nodules are present within the right mid to lower pole, measuring 1.6 cm in the midpole and 1.6 cm in the lower pole, previously measured at 1.3 and 1.0 cm, respectively.        On the left, there is a 2.4 cm isoechoic nodule of the lower pole, previously 1.7 cm.  There is also heterogeneous nodularity throughout the lower pole measuring approximately 2.7 cm, without distinct correlate on prior.     No evidence for cervical lymphadenopathy.     Impression:     Multinodular thyroid gland, with interval enlargement of nodules bilaterally as described above.    TSH: normal  FT4 normal      Pre-procedure diagnosis: The primary encounter diagnosis was Hyperthyroidism. Diagnoses of Nodular thyroid disease and Hypovitaminosis D were also pertinent to this visit.     Post-procedure diagnosis: same    Procedure: Flexible fiberoptic laryngoscopy    Surgeon: Rafy Cannon MD    Anesthesia: 2% Lidocaine with Phenylephrine topical    Risks, benefits, and alternatives of the procedure were discussed with the patient, and the patient consented to the fiberoptic examination.  We applied a topical nasal decongestant and analgesic.  After adequate anesthesia was obtained, the flexible fiberoptic scope was passed through the nasal cavity.  The entire pharynx (nasopharynx to hypopharynx) and the larynx were visualized. At the end of the examination, the scope was removed. The patient tolerated the procedure well with no complications.     Findings:  -     Laryngeal mucosa is normal  -     Post-cricoid region: normal  -     Lingual tonsils have no hypertrophy  -     Adenoids have no  hypertrophy  -     Right vocal fold: normal mobility     mass/lesion: none  -     Left vocal fold: normal mobility     mass/lesion: none  -     Other findings: none    Assessment:       1. Hyperthyroidism    2. Nodular thyroid disease    3. Hypovitaminosis D          Plan:         I explained the risks of thyroidectomy include, but are not limited to, infection, bleeding, scarring, failure to achieve the diagnosis, no evidence of cancer, recurrence, collection of blood or tissue fluid requiring drainage, injury to the recurrent laryngeal nerve with resultant temporary or permanent hoarseness (1% permanent risk with up to 10% temporary risk, greater in revision operations), injury to the superior laryngeal nerve with resultant loss of the upper register for singing or challenges with yelling, temporary or permanent hypocalcemia related to injury or devascularization of the parathyroid glands (less than 5% permanent, up to 30-60% when paratracheal dissection is accomplished, again greater in revision operations), need for tracheostomy in case of bilateral cord weakness, and the need for additional procedures or therapies. Time was allowed for questions, and all questions were answered to the patient's apparent satisfaction. The risks of paratracheal lymph node dissection are included above. Informed consent was obtained.    she is aware that, if malignancy is detected, then she may require additional therapy.

## 2022-06-30 ENCOUNTER — OFFICE VISIT (OUTPATIENT)
Dept: FAMILY MEDICINE | Facility: CLINIC | Age: 72
End: 2022-06-30
Payer: MEDICARE

## 2022-06-30 VITALS
OXYGEN SATURATION: 97 % | DIASTOLIC BLOOD PRESSURE: 76 MMHG | HEIGHT: 61 IN | BODY MASS INDEX: 25.64 KG/M2 | SYSTOLIC BLOOD PRESSURE: 130 MMHG | WEIGHT: 135.81 LBS | RESPIRATION RATE: 18 BRPM | HEART RATE: 58 BPM

## 2022-06-30 DIAGNOSIS — M15.9 GENERALIZED OSTEOARTHRITIS: ICD-10-CM

## 2022-06-30 DIAGNOSIS — I10 ESSENTIAL HYPERTENSION: ICD-10-CM

## 2022-06-30 DIAGNOSIS — E66.3 OVERWEIGHT (BMI 25.0-29.9): ICD-10-CM

## 2022-06-30 DIAGNOSIS — M79.601 RIGHT ARM PAIN: Primary | ICD-10-CM

## 2022-06-30 PROCEDURE — 1125F PR PAIN SEVERITY QUANTIFIED, PAIN PRESENT: ICD-10-PCS | Mod: CPTII,S$GLB,, | Performed by: FAMILY MEDICINE

## 2022-06-30 PROCEDURE — 99999 PR PBB SHADOW E&M-EST. PATIENT-LVL III: CPT | Mod: PBBFAC,,, | Performed by: FAMILY MEDICINE

## 2022-06-30 PROCEDURE — 1101F PR PT FALLS ASSESS DOC 0-1 FALLS W/OUT INJ PAST YR: ICD-10-PCS | Mod: CPTII,S$GLB,, | Performed by: FAMILY MEDICINE

## 2022-06-30 PROCEDURE — 3078F DIAST BP <80 MM HG: CPT | Mod: CPTII,S$GLB,, | Performed by: FAMILY MEDICINE

## 2022-06-30 PROCEDURE — 1160F PR REVIEW ALL MEDS BY PRESCRIBER/CLIN PHARMACIST DOCUMENTED: ICD-10-PCS | Mod: CPTII,S$GLB,, | Performed by: FAMILY MEDICINE

## 2022-06-30 PROCEDURE — 1160F RVW MEDS BY RX/DR IN RCRD: CPT | Mod: CPTII,S$GLB,, | Performed by: FAMILY MEDICINE

## 2022-06-30 PROCEDURE — 3078F PR MOST RECENT DIASTOLIC BLOOD PRESSURE < 80 MM HG: ICD-10-PCS | Mod: CPTII,S$GLB,, | Performed by: FAMILY MEDICINE

## 2022-06-30 PROCEDURE — 3008F PR BODY MASS INDEX (BMI) DOCUMENTED: ICD-10-PCS | Mod: CPTII,S$GLB,, | Performed by: FAMILY MEDICINE

## 2022-06-30 PROCEDURE — 3288F PR FALLS RISK ASSESSMENT DOCUMENTED: ICD-10-PCS | Mod: CPTII,S$GLB,, | Performed by: FAMILY MEDICINE

## 2022-06-30 PROCEDURE — 99213 PR OFFICE/OUTPT VISIT, EST, LEVL III, 20-29 MIN: ICD-10-PCS | Mod: S$GLB,,, | Performed by: FAMILY MEDICINE

## 2022-06-30 PROCEDURE — 1125F AMNT PAIN NOTED PAIN PRSNT: CPT | Mod: CPTII,S$GLB,, | Performed by: FAMILY MEDICINE

## 2022-06-30 PROCEDURE — 1159F PR MEDICATION LIST DOCUMENTED IN MEDICAL RECORD: ICD-10-PCS | Mod: CPTII,S$GLB,, | Performed by: FAMILY MEDICINE

## 2022-06-30 PROCEDURE — 4010F ACE/ARB THERAPY RXD/TAKEN: CPT | Mod: CPTII,S$GLB,, | Performed by: FAMILY MEDICINE

## 2022-06-30 PROCEDURE — 99999 PR PBB SHADOW E&M-EST. PATIENT-LVL III: ICD-10-PCS | Mod: PBBFAC,,, | Performed by: FAMILY MEDICINE

## 2022-06-30 PROCEDURE — 3075F PR MOST RECENT SYSTOLIC BLOOD PRESS GE 130-139MM HG: ICD-10-PCS | Mod: CPTII,S$GLB,, | Performed by: FAMILY MEDICINE

## 2022-06-30 PROCEDURE — 1101F PT FALLS ASSESS-DOCD LE1/YR: CPT | Mod: CPTII,S$GLB,, | Performed by: FAMILY MEDICINE

## 2022-06-30 PROCEDURE — 1159F MED LIST DOCD IN RCRD: CPT | Mod: CPTII,S$GLB,, | Performed by: FAMILY MEDICINE

## 2022-06-30 PROCEDURE — 3075F SYST BP GE 130 - 139MM HG: CPT | Mod: CPTII,S$GLB,, | Performed by: FAMILY MEDICINE

## 2022-06-30 PROCEDURE — 3288F FALL RISK ASSESSMENT DOCD: CPT | Mod: CPTII,S$GLB,, | Performed by: FAMILY MEDICINE

## 2022-06-30 PROCEDURE — 3008F BODY MASS INDEX DOCD: CPT | Mod: CPTII,S$GLB,, | Performed by: FAMILY MEDICINE

## 2022-06-30 PROCEDURE — 99213 OFFICE O/P EST LOW 20 MIN: CPT | Mod: S$GLB,,, | Performed by: FAMILY MEDICINE

## 2022-06-30 PROCEDURE — 4010F PR ACE/ARB THEARPY RXD/TAKEN: ICD-10-PCS | Mod: CPTII,S$GLB,, | Performed by: FAMILY MEDICINE

## 2022-07-15 PROBLEM — E05.90 HYPERTHYROIDISM: Status: RESOLVED | Noted: 2018-05-10 | Resolved: 2022-07-15

## 2022-07-15 PROBLEM — E05.20 TOXIC MULTINODULAR GOITER: Status: RESOLVED | Noted: 2019-02-26 | Resolved: 2022-07-15

## 2022-08-03 ENCOUNTER — OFFICE VISIT (OUTPATIENT)
Dept: OTOLARYNGOLOGY | Facility: CLINIC | Age: 72
End: 2022-08-03
Payer: MEDICARE

## 2022-08-03 VITALS — WEIGHT: 134.94 LBS | BODY MASS INDEX: 25.48 KG/M2 | HEIGHT: 61 IN

## 2022-08-03 DIAGNOSIS — E89.0 S/P THYROIDECTOMY: Primary | ICD-10-CM

## 2022-08-03 PROCEDURE — 1159F PR MEDICATION LIST DOCUMENTED IN MEDICAL RECORD: ICD-10-PCS | Mod: CPTII,S$GLB,, | Performed by: OTOLARYNGOLOGY

## 2022-08-03 PROCEDURE — 99999 PR PBB SHADOW E&M-EST. PATIENT-LVL III: ICD-10-PCS | Mod: PBBFAC,,, | Performed by: OTOLARYNGOLOGY

## 2022-08-03 PROCEDURE — 99024 PR POST-OP FOLLOW-UP VISIT: ICD-10-PCS | Mod: S$GLB,,, | Performed by: OTOLARYNGOLOGY

## 2022-08-03 PROCEDURE — 1160F RVW MEDS BY RX/DR IN RCRD: CPT | Mod: CPTII,S$GLB,, | Performed by: OTOLARYNGOLOGY

## 2022-08-03 PROCEDURE — 1159F MED LIST DOCD IN RCRD: CPT | Mod: CPTII,S$GLB,, | Performed by: OTOLARYNGOLOGY

## 2022-08-03 PROCEDURE — 1160F PR REVIEW ALL MEDS BY PRESCRIBER/CLIN PHARMACIST DOCUMENTED: ICD-10-PCS | Mod: CPTII,S$GLB,, | Performed by: OTOLARYNGOLOGY

## 2022-08-03 PROCEDURE — 1126F PR PAIN SEVERITY QUANTIFIED, NO PAIN PRESENT: ICD-10-PCS | Mod: CPTII,S$GLB,, | Performed by: OTOLARYNGOLOGY

## 2022-08-03 PROCEDURE — 3008F PR BODY MASS INDEX (BMI) DOCUMENTED: ICD-10-PCS | Mod: CPTII,S$GLB,, | Performed by: OTOLARYNGOLOGY

## 2022-08-03 PROCEDURE — 99999 PR PBB SHADOW E&M-EST. PATIENT-LVL III: CPT | Mod: PBBFAC,,, | Performed by: OTOLARYNGOLOGY

## 2022-08-03 PROCEDURE — 99024 POSTOP FOLLOW-UP VISIT: CPT | Mod: S$GLB,,, | Performed by: OTOLARYNGOLOGY

## 2022-08-03 PROCEDURE — 4010F PR ACE/ARB THEARPY RXD/TAKEN: ICD-10-PCS | Mod: CPTII,S$GLB,, | Performed by: OTOLARYNGOLOGY

## 2022-08-03 PROCEDURE — 1126F AMNT PAIN NOTED NONE PRSNT: CPT | Mod: CPTII,S$GLB,, | Performed by: OTOLARYNGOLOGY

## 2022-08-03 PROCEDURE — 4010F ACE/ARB THERAPY RXD/TAKEN: CPT | Mod: CPTII,S$GLB,, | Performed by: OTOLARYNGOLOGY

## 2022-08-03 PROCEDURE — 3008F BODY MASS INDEX DOCD: CPT | Mod: CPTII,S$GLB,, | Performed by: OTOLARYNGOLOGY

## 2022-08-03 NOTE — PROGRESS NOTES
Flacakris is here for a post-operative visit following total thyroidectomy    No issues, doing well  Pain controlled  No voicing concerns    Exam:  Alert, active, appropriate  Incision c/d/i, wound with appropriate mild edema, no erythema  Residual Dermabond removed    Reviewed Pathology  1 AND 2.  RIGHT AND LEFT THYROID LOBES:   - MULTINODULAR GOITER AND CHRONIC THYROIDITIS.     Healing well   Endocrinology appointment scheduled   Follow-up with me as needed

## 2022-11-07 DIAGNOSIS — E05.90 HYPERTHYROIDISM: ICD-10-CM

## 2022-11-08 ENCOUNTER — LAB VISIT (OUTPATIENT)
Dept: LAB | Facility: HOSPITAL | Age: 72
End: 2022-11-08
Attending: PHYSICIAN ASSISTANT
Payer: MEDICARE

## 2022-11-08 DIAGNOSIS — E05.90 HYPERTHYROIDISM: ICD-10-CM

## 2022-11-08 LAB
ALBUMIN SERPL BCP-MCNC: 4.1 G/DL (ref 3.5–5.2)
ALP SERPL-CCNC: 42 U/L (ref 55–135)
ALT SERPL W/O P-5'-P-CCNC: 19 U/L (ref 10–44)
ANION GAP SERPL CALC-SCNC: 12 MMOL/L (ref 8–16)
AST SERPL-CCNC: 17 U/L (ref 10–40)
BASOPHILS # BLD AUTO: 0.01 K/UL (ref 0–0.2)
BASOPHILS NFR BLD: 0.2 % (ref 0–1.9)
BILIRUB SERPL-MCNC: 0.6 MG/DL (ref 0.1–1)
BUN SERPL-MCNC: 16 MG/DL (ref 8–23)
CALCIUM SERPL-MCNC: 9.8 MG/DL (ref 8.7–10.5)
CHLORIDE SERPL-SCNC: 103 MMOL/L (ref 95–110)
CHOLEST SERPL-MCNC: 225 MG/DL (ref 120–199)
CHOLEST/HDLC SERPL: 3.1 {RATIO} (ref 2–5)
CO2 SERPL-SCNC: 27 MMOL/L (ref 23–29)
CREAT SERPL-MCNC: 0.8 MG/DL (ref 0.5–1.4)
DIFFERENTIAL METHOD: ABNORMAL
EOSINOPHIL # BLD AUTO: 0.1 K/UL (ref 0–0.5)
EOSINOPHIL NFR BLD: 1.9 % (ref 0–8)
ERYTHROCYTE [DISTWIDTH] IN BLOOD BY AUTOMATED COUNT: 11.9 % (ref 11.5–14.5)
EST. GFR  (NO RACE VARIABLE): >60 ML/MIN/1.73 M^2
GLUCOSE SERPL-MCNC: 82 MG/DL (ref 70–110)
HCT VFR BLD AUTO: 42.2 % (ref 37–48.5)
HDLC SERPL-MCNC: 72 MG/DL (ref 40–75)
HDLC SERPL: 32 % (ref 20–50)
HGB BLD-MCNC: 13.6 G/DL (ref 12–16)
IMM GRANULOCYTES # BLD AUTO: 0.02 K/UL (ref 0–0.04)
IMM GRANULOCYTES NFR BLD AUTO: 0.4 % (ref 0–0.5)
LDLC SERPL CALC-MCNC: 134.8 MG/DL (ref 63–159)
LYMPHOCYTES # BLD AUTO: 1.4 K/UL (ref 1–4.8)
LYMPHOCYTES NFR BLD: 30.5 % (ref 18–48)
MCH RBC QN AUTO: 32.8 PG (ref 27–31)
MCHC RBC AUTO-ENTMCNC: 32.2 G/DL (ref 32–36)
MCV RBC AUTO: 102 FL (ref 82–98)
MONOCYTES # BLD AUTO: 0.4 K/UL (ref 0.3–1)
MONOCYTES NFR BLD: 8.4 % (ref 4–15)
NEUTROPHILS # BLD AUTO: 2.7 K/UL (ref 1.8–7.7)
NEUTROPHILS NFR BLD: 58.6 % (ref 38–73)
NONHDLC SERPL-MCNC: 153 MG/DL
NRBC BLD-RTO: 0 /100 WBC
PLATELET # BLD AUTO: 303 K/UL (ref 150–450)
PMV BLD AUTO: 9.9 FL (ref 9.2–12.9)
POTASSIUM SERPL-SCNC: 4 MMOL/L (ref 3.5–5.1)
PROT SERPL-MCNC: 7.7 G/DL (ref 6–8.4)
RBC # BLD AUTO: 4.15 M/UL (ref 4–5.4)
SODIUM SERPL-SCNC: 142 MMOL/L (ref 136–145)
T4 FREE SERPL-MCNC: 1.04 NG/DL (ref 0.71–1.51)
TRIGL SERPL-MCNC: 91 MG/DL (ref 30–150)
TSH SERPL DL<=0.005 MIU/L-ACNC: 0.09 UIU/ML (ref 0.4–4)
WBC # BLD AUTO: 4.66 K/UL (ref 3.9–12.7)

## 2022-11-08 PROCEDURE — 85025 COMPLETE CBC W/AUTO DIFF WBC: CPT | Performed by: PHYSICIAN ASSISTANT

## 2022-11-08 PROCEDURE — 84443 ASSAY THYROID STIM HORMONE: CPT | Performed by: PHYSICIAN ASSISTANT

## 2022-11-08 PROCEDURE — 84439 ASSAY OF FREE THYROXINE: CPT | Performed by: PHYSICIAN ASSISTANT

## 2022-11-08 PROCEDURE — 80061 LIPID PANEL: CPT | Performed by: PHYSICIAN ASSISTANT

## 2022-11-08 PROCEDURE — 36415 COLL VENOUS BLD VENIPUNCTURE: CPT | Mod: PO | Performed by: PHYSICIAN ASSISTANT

## 2022-11-08 PROCEDURE — 80053 COMPREHEN METABOLIC PANEL: CPT | Performed by: PHYSICIAN ASSISTANT

## 2022-11-08 RX ORDER — METHIMAZOLE 5 MG/1
TABLET ORAL
Qty: 90 TABLET | Refills: 3 | OUTPATIENT
Start: 2022-11-08

## 2022-11-08 NOTE — TELEPHONE ENCOUNTER
Please refuse as noted below:    Medication  methIMAzole (TAPAZOLE) 5 MG Tab [[id number]]  Outpatient Medication Detail     Disp Refills Start End SOL   methIMAzole (TAPAZOLE) 5 MG Tab (Discontinued) 90 tablet 3 10/8/2021 7/15/2022 No   Sig - Route: Take 1 tablet (5 mg total) by mouth once daily. - Oral   Sent to pharmacy as: methIMAzole (TAPAZOLE) 5 MG Tab   Class: Normal   Reason for Discontinue: Stop Taking at Discharge   Order: [id number]   Date/Time Signed: 10/8/2021 09:44       E-Prescribing Status: Receipt confirmed by pharmacy (10/8/2021  9:44 AM CDT)   E-Cancel Status: Request denied by pharmacy (7/15/2022  6:18 AM CDT)       E-Cancel Status Note: Rx not found. Call [phone] if you have questions

## 2022-11-22 ENCOUNTER — OFFICE VISIT (OUTPATIENT)
Dept: ENDOCRINOLOGY | Facility: CLINIC | Age: 72
End: 2022-11-22
Payer: MEDICARE

## 2022-11-22 VITALS
BODY MASS INDEX: 23.7 KG/M2 | HEART RATE: 50 BPM | HEIGHT: 61 IN | WEIGHT: 125.56 LBS | TEMPERATURE: 98 F | SYSTOLIC BLOOD PRESSURE: 112 MMHG | DIASTOLIC BLOOD PRESSURE: 80 MMHG | OXYGEN SATURATION: 97 %

## 2022-11-22 DIAGNOSIS — Z78.0 OSTEOPENIA AFTER MENOPAUSE: ICD-10-CM

## 2022-11-22 DIAGNOSIS — E53.8 VITAMIN B12 DEFICIENCY: ICD-10-CM

## 2022-11-22 DIAGNOSIS — E89.0 POST-SURGICAL HYPOTHYROIDISM: Primary | ICD-10-CM

## 2022-11-22 DIAGNOSIS — M85.80 OSTEOPENIA AFTER MENOPAUSE: ICD-10-CM

## 2022-11-22 DIAGNOSIS — E55.9 HYPOVITAMINOSIS D: ICD-10-CM

## 2022-11-22 PROCEDURE — 3074F SYST BP LT 130 MM HG: CPT | Mod: CPTII,S$GLB,, | Performed by: PHYSICIAN ASSISTANT

## 2022-11-22 PROCEDURE — 1159F PR MEDICATION LIST DOCUMENTED IN MEDICAL RECORD: ICD-10-PCS | Mod: CPTII,S$GLB,, | Performed by: PHYSICIAN ASSISTANT

## 2022-11-22 PROCEDURE — 99214 OFFICE O/P EST MOD 30 MIN: CPT | Mod: S$GLB,,, | Performed by: PHYSICIAN ASSISTANT

## 2022-11-22 PROCEDURE — 1159F MED LIST DOCD IN RCRD: CPT | Mod: CPTII,S$GLB,, | Performed by: PHYSICIAN ASSISTANT

## 2022-11-22 PROCEDURE — 4010F PR ACE/ARB THEARPY RXD/TAKEN: ICD-10-PCS | Mod: CPTII,S$GLB,, | Performed by: PHYSICIAN ASSISTANT

## 2022-11-22 PROCEDURE — 1125F PR PAIN SEVERITY QUANTIFIED, PAIN PRESENT: ICD-10-PCS | Mod: CPTII,S$GLB,, | Performed by: PHYSICIAN ASSISTANT

## 2022-11-22 PROCEDURE — 3079F DIAST BP 80-89 MM HG: CPT | Mod: CPTII,S$GLB,, | Performed by: PHYSICIAN ASSISTANT

## 2022-11-22 PROCEDURE — 99214 PR OFFICE/OUTPT VISIT, EST, LEVL IV, 30-39 MIN: ICD-10-PCS | Mod: S$GLB,,, | Performed by: PHYSICIAN ASSISTANT

## 2022-11-22 PROCEDURE — 3079F PR MOST RECENT DIASTOLIC BLOOD PRESSURE 80-89 MM HG: ICD-10-PCS | Mod: CPTII,S$GLB,, | Performed by: PHYSICIAN ASSISTANT

## 2022-11-22 PROCEDURE — 4010F ACE/ARB THERAPY RXD/TAKEN: CPT | Mod: CPTII,S$GLB,, | Performed by: PHYSICIAN ASSISTANT

## 2022-11-22 PROCEDURE — 1125F AMNT PAIN NOTED PAIN PRSNT: CPT | Mod: CPTII,S$GLB,, | Performed by: PHYSICIAN ASSISTANT

## 2022-11-22 PROCEDURE — 3008F BODY MASS INDEX DOCD: CPT | Mod: CPTII,S$GLB,, | Performed by: PHYSICIAN ASSISTANT

## 2022-11-22 PROCEDURE — 3074F PR MOST RECENT SYSTOLIC BLOOD PRESSURE < 130 MM HG: ICD-10-PCS | Mod: CPTII,S$GLB,, | Performed by: PHYSICIAN ASSISTANT

## 2022-11-22 PROCEDURE — 1101F PR PT FALLS ASSESS DOC 0-1 FALLS W/OUT INJ PAST YR: ICD-10-PCS | Mod: CPTII,S$GLB,, | Performed by: PHYSICIAN ASSISTANT

## 2022-11-22 PROCEDURE — 1160F RVW MEDS BY RX/DR IN RCRD: CPT | Mod: CPTII,S$GLB,, | Performed by: PHYSICIAN ASSISTANT

## 2022-11-22 PROCEDURE — 3288F PR FALLS RISK ASSESSMENT DOCUMENTED: ICD-10-PCS | Mod: CPTII,S$GLB,, | Performed by: PHYSICIAN ASSISTANT

## 2022-11-22 PROCEDURE — 99999 PR PBB SHADOW E&M-EST. PATIENT-LVL III: CPT | Mod: PBBFAC,,, | Performed by: PHYSICIAN ASSISTANT

## 2022-11-22 PROCEDURE — 1101F PT FALLS ASSESS-DOCD LE1/YR: CPT | Mod: CPTII,S$GLB,, | Performed by: PHYSICIAN ASSISTANT

## 2022-11-22 PROCEDURE — 99999 PR PBB SHADOW E&M-EST. PATIENT-LVL III: ICD-10-PCS | Mod: PBBFAC,,, | Performed by: PHYSICIAN ASSISTANT

## 2022-11-22 PROCEDURE — 1160F PR REVIEW ALL MEDS BY PRESCRIBER/CLIN PHARMACIST DOCUMENTED: ICD-10-PCS | Mod: CPTII,S$GLB,, | Performed by: PHYSICIAN ASSISTANT

## 2022-11-22 PROCEDURE — 3008F PR BODY MASS INDEX (BMI) DOCUMENTED: ICD-10-PCS | Mod: CPTII,S$GLB,, | Performed by: PHYSICIAN ASSISTANT

## 2022-11-22 PROCEDURE — 3288F FALL RISK ASSESSMENT DOCD: CPT | Mod: CPTII,S$GLB,, | Performed by: PHYSICIAN ASSISTANT

## 2022-11-22 RX ORDER — LEVOTHYROXINE SODIUM 75 UG/1
75 TABLET ORAL
Qty: 90 TABLET | Refills: 3 | Status: SHIPPED | OUTPATIENT
Start: 2022-11-22 | End: 2023-05-05

## 2022-11-22 NOTE — PROGRESS NOTES
CC: Hyperthyroidism    HPI: Jonathan Hinton is a 72 y.o. female here for hyperthyroidism that was diagnosed several years ago along with pending conditions listed in the Visit Diagnosis. +FHx of thyroid disease in one sister and two nieces. Her dad, brother and three uncles have DM. No hx of neck radiation. Hx of Graves' Disease. She had multiple thyroid nodules. She had a thyroidectomy w/ Dr. Cannon in 7/22. Path was benign.     PMHx, PSHx: reviewed in epic.    Social Hx: no ETOH/tobacco use. She smoked > ten years ~1 pack per day. She quit in 5/18 on her own. She worked at [email or web address].    Taking 5 mg Levothyroxine 88 mcg daily. She met with Dr. Marie in 1/20 and decided to forego thyroid surgery.   + cold intolerance  No palpitations, sweating (night), hair loss, wt loss (intentional), diarrhea/constipation.     Eye exam: 10/21- cataracts  + tearing and grittiness in eyes.     Last DEXA 11/20 was wnl. This improved from osteopenia the previous exam. No falls this year. No steroid injections. She is taking vitamin d and calcium. She runs 3-4x times weekly. She walks for 45 min. Taking ca and vd.     No SOB,  Dysphagia. States her voice is deeper.     Wt Readings from Last 6 Encounters:   11/22/22 56.9 kg (125 lb 8.8 oz)   08/03/22 61.2 kg (134 lb 14.7 oz)   07/14/22 61.2 kg (134 lb 14.7 oz)   06/30/22 61.6 kg (135 lb 12.9 oz)   05/17/22 61.9 kg (136 lb 7.4 oz)   04/08/22 63.2 kg (139 lb 5.3 oz)      ROS:   Constitutional: energy stable, wt loss (10 lbs since 11/20).   Eyes: + blurry vision, denies double vision.   Cardiovascular: Denies current anginal symptoms  Respiratory: Denies current respiratory difficulty  Gastrointestinal: Denies recent bowel disturbances  GenitoUrinary - No dysuria  Skin: No new skin rash  Musc: legs and arms pain, knee pain  Neurologic:+numbness and tingling in hands, + headaches.    Endocrine: no polyphagia, polydipsia, polyuria  Remainder ROS negative     Personally reviewed imaging and  labs below:    Lab Results   Component Value Date    TSH 0.085 (L) 11/08/2022    H7CDZJA 93 05/01/2020    FREET4 1.04 11/08/2022        Chemistry        Component Value Date/Time     11/08/2022 0915    K 4.0 11/08/2022 0915     11/08/2022 0915    CO2 27 11/08/2022 0915    BUN 16 11/08/2022 0915    CREATININE 0.8 11/08/2022 0915    GLU 82 11/08/2022 0915        Component Value Date/Time    CALCIUM 9.8 11/08/2022 0915    ALKPHOS 42 (L) 11/08/2022 0915    AST 17 11/08/2022 0915    ALT 19 11/08/2022 0915    BILITOT 0.6 11/08/2022 0915    ESTGFRAFRICA >60 07/14/2022 1805    EGFRNONAA >60 07/14/2022 1805         Lab Results   Component Value Date    HGBA1C 5.3 05/10/2018      EXAMINATION:  US SOFT TISSUE HEAD NECK THYROID     CLINICAL HISTORY:  Nontoxic single thyroid nodule     TECHNIQUE:  Ultrasound of the thyroid and cervical lymph nodes was performed.     COMPARISON:  11/12/2020     FINDINGS:  The right hemithyroid measures 4.8 x 1.7 x 2.0 cm and the left 4.8 x 1.9 x 2.7 cm.  The thyroid isthmus measures 3 mm.  Total thyroid volume is 21 cc.     The thyroid parenchyma is heterogeneous without hypervascularity.     Two solid isoechoic nodules are present within the right mid to lower pole, measuring 1.6 cm in the midpole and 1.6 cm in the lower pole, previously measured at 1.3 and 1.0 cm, respectively.  On the left, there is a 2.4 cm isoechoic nodule of the lower pole, previously 1.7 cm.  There is also heterogeneous nodularity throughout the lower pole measuring approximately 2.7 cm, without distinct correlate on prior.     No evidence for cervical lymphadenopathy.     Impression:     Multinodular thyroid gland, with interval enlargement of nodules bilaterally as described above.        Electronically signed by: Chris Foy MD  Date:                                            10/01/2021  Time:                                           09:29                PE:  GENERAL: Elderly female, Well hydrated.  NAD.  NECK: Supple neck, normal thyroid. No bruit  LYMPHATIC: No cervical or supraclavicular lymphadenopathy  CARDIOVASCULAR: Normal heart sounds, no pedal edema  RESPIRATORY: Normal effort, CTAB.  Psych: appropriate mood and affect  NEURO:CN ll-Xll intact, steady gait    Assessment/Plan:   1. Post-surgical hypothyroidism  levothyroxine (SYNTHROID) 75 MCG tablet    T4, Free    TSH    T4, Free    TSH      2. Hypovitaminosis D  Vitamin D      3. Osteopenia after menopause  Comprehensive Metabolic Panel      4. Vitamin B12 deficiency  Vitamin B12          Post-surgical hypothyroidism-TSH is supressed. Decrease LT4 to 75 mcg qd. Recheck in six weeks.  Osteopenia-continue ca and vitamin D. Continue weight bearing exercises. Repeat DEXA scan 11/22.  Vzebpofztxmdez-xsaige-yofufqmv zocor  Hypertension-stable-Continue meds.  Hypovitaminosis E-zbuubb-vwgffqkz vd.  Vitamin b 12 deficiency-check b12      Dexa scan  TSH/T4 in six weeks  F/u in 4 months w/ labs prior

## 2022-11-25 ENCOUNTER — TELEPHONE (OUTPATIENT)
Dept: ENDOCRINOLOGY | Facility: CLINIC | Age: 72
End: 2022-11-25
Payer: MEDICARE

## 2022-11-25 NOTE — TELEPHONE ENCOUNTER
----- Message from Shana Garcia sent at 11/25/2022 11:04 AM CST -----  Contact: [phone]  Type: Needs Medical Advice  Who Called:  Pt     Best Call Back Number: [phone]    Additional Information: Pt calling to reschedule appt that she states she missed on 11/22. Advised pt that her appt shows she was there and she stated she was not. Pt stated she does not remember going to this appt. Pls call back and advise

## 2022-11-25 NOTE — TELEPHONE ENCOUNTER
Spoke with patient, she stated she could not remember at first if she had  visit with MsRicardo Taryn, then she said she has her AVS.

## 2023-01-05 ENCOUNTER — HOSPITAL ENCOUNTER (OUTPATIENT)
Dept: RADIOLOGY | Facility: CLINIC | Age: 73
Discharge: HOME | End: 2023-01-05
Attending: PHYSICIAN ASSISTANT
Payer: MEDICARE

## 2023-01-05 DIAGNOSIS — Z78.0 OSTEOPENIA AFTER MENOPAUSE: ICD-10-CM

## 2023-01-05 DIAGNOSIS — M85.80 OSTEOPENIA AFTER MENOPAUSE: ICD-10-CM

## 2023-01-05 DIAGNOSIS — Z78.0 POSTMENOPAUSAL: ICD-10-CM

## 2023-01-05 PROCEDURE — 77080 DEXA BONE DENSITY SPINE HIP: ICD-10-PCS | Mod: 26,,, | Performed by: RADIOLOGY

## 2023-01-05 PROCEDURE — 77080 DXA BONE DENSITY AXIAL: CPT | Mod: TC,PO

## 2023-01-05 PROCEDURE — 77080 DXA BONE DENSITY AXIAL: CPT | Mod: 26,,, | Performed by: RADIOLOGY

## 2023-02-10 ENCOUNTER — TELEPHONE (OUTPATIENT)
Dept: FAMILY MEDICINE | Facility: CLINIC | Age: 73
End: 2023-02-10
Payer: MEDICARE

## 2023-02-14 ENCOUNTER — OFFICE VISIT (OUTPATIENT)
Dept: FAMILY MEDICINE | Facility: CLINIC | Age: 73
End: 2023-02-14
Payer: MEDICARE

## 2023-02-14 VITALS
SYSTOLIC BLOOD PRESSURE: 128 MMHG | BODY MASS INDEX: 23.65 KG/M2 | WEIGHT: 125.25 LBS | TEMPERATURE: 98 F | HEIGHT: 61 IN | OXYGEN SATURATION: 98 % | DIASTOLIC BLOOD PRESSURE: 80 MMHG | HEART RATE: 65 BPM

## 2023-02-14 DIAGNOSIS — M25.511 CHRONIC RIGHT SHOULDER PAIN: ICD-10-CM

## 2023-02-14 DIAGNOSIS — Z12.31 ENCOUNTER FOR SCREENING MAMMOGRAM FOR BREAST CANCER: Primary | ICD-10-CM

## 2023-02-14 DIAGNOSIS — G89.29 CHRONIC RIGHT SHOULDER PAIN: ICD-10-CM

## 2023-02-14 PROCEDURE — 96372 PR INJECTION,THERAP/PROPH/DIAG2ST, IM OR SUBCUT: ICD-10-PCS | Mod: ,,, | Performed by: FAMILY MEDICINE

## 2023-02-14 PROCEDURE — 1101F PR PT FALLS ASSESS DOC 0-1 FALLS W/OUT INJ PAST YR: ICD-10-PCS | Mod: CPTII,,, | Performed by: FAMILY MEDICINE

## 2023-02-14 PROCEDURE — 90694 VACC AIIV4 NO PRSRV 0.5ML IM: CPT | Mod: ,,, | Performed by: FAMILY MEDICINE

## 2023-02-14 PROCEDURE — 3008F PR BODY MASS INDEX (BMI) DOCUMENTED: ICD-10-PCS | Mod: CPTII,,, | Performed by: FAMILY MEDICINE

## 2023-02-14 PROCEDURE — 3074F PR MOST RECENT SYSTOLIC BLOOD PRESSURE < 130 MM HG: ICD-10-PCS | Mod: CPTII,,, | Performed by: FAMILY MEDICINE

## 2023-02-14 PROCEDURE — 1101F PT FALLS ASSESS-DOCD LE1/YR: CPT | Mod: CPTII,,, | Performed by: FAMILY MEDICINE

## 2023-02-14 PROCEDURE — 90694 FLU VACCINE - QUADRIVALENT - ADJUVANTED: ICD-10-PCS | Mod: ,,, | Performed by: FAMILY MEDICINE

## 2023-02-14 PROCEDURE — 99213 PR OFFICE/OUTPT VISIT, EST, LEVL III, 20-29 MIN: ICD-10-PCS | Mod: 25,,, | Performed by: FAMILY MEDICINE

## 2023-02-14 PROCEDURE — 1159F PR MEDICATION LIST DOCUMENTED IN MEDICAL RECORD: ICD-10-PCS | Mod: CPTII,,, | Performed by: FAMILY MEDICINE

## 2023-02-14 PROCEDURE — 1125F PR PAIN SEVERITY QUANTIFIED, PAIN PRESENT: ICD-10-PCS | Mod: CPTII,,, | Performed by: FAMILY MEDICINE

## 2023-02-14 PROCEDURE — 4010F PR ACE/ARB THEARPY RXD/TAKEN: ICD-10-PCS | Mod: CPTII,,, | Performed by: FAMILY MEDICINE

## 2023-02-14 PROCEDURE — 1125F AMNT PAIN NOTED PAIN PRSNT: CPT | Mod: CPTII,,, | Performed by: FAMILY MEDICINE

## 2023-02-14 PROCEDURE — 3288F PR FALLS RISK ASSESSMENT DOCUMENTED: ICD-10-PCS | Mod: CPTII,,, | Performed by: FAMILY MEDICINE

## 2023-02-14 PROCEDURE — 96372 THER/PROPH/DIAG INJ SC/IM: CPT | Mod: ,,, | Performed by: FAMILY MEDICINE

## 2023-02-14 PROCEDURE — 1159F MED LIST DOCD IN RCRD: CPT | Mod: CPTII,,, | Performed by: FAMILY MEDICINE

## 2023-02-14 PROCEDURE — 4010F ACE/ARB THERAPY RXD/TAKEN: CPT | Mod: CPTII,,, | Performed by: FAMILY MEDICINE

## 2023-02-14 PROCEDURE — 3074F SYST BP LT 130 MM HG: CPT | Mod: CPTII,,, | Performed by: FAMILY MEDICINE

## 2023-02-14 PROCEDURE — 3079F DIAST BP 80-89 MM HG: CPT | Mod: CPTII,,, | Performed by: FAMILY MEDICINE

## 2023-02-14 PROCEDURE — G0008 FLU VACCINE - QUADRIVALENT - ADJUVANTED: ICD-10-PCS | Mod: 59,,, | Performed by: FAMILY MEDICINE

## 2023-02-14 PROCEDURE — G0008 ADMIN INFLUENZA VIRUS VAC: HCPCS | Mod: 59,,, | Performed by: FAMILY MEDICINE

## 2023-02-14 PROCEDURE — 99213 OFFICE O/P EST LOW 20 MIN: CPT | Mod: 25,,, | Performed by: FAMILY MEDICINE

## 2023-02-14 PROCEDURE — 3008F BODY MASS INDEX DOCD: CPT | Mod: CPTII,,, | Performed by: FAMILY MEDICINE

## 2023-02-14 PROCEDURE — 3288F FALL RISK ASSESSMENT DOCD: CPT | Mod: CPTII,,, | Performed by: FAMILY MEDICINE

## 2023-02-14 PROCEDURE — 3079F PR MOST RECENT DIASTOLIC BLOOD PRESSURE 80-89 MM HG: ICD-10-PCS | Mod: CPTII,,, | Performed by: FAMILY MEDICINE

## 2023-02-14 RX ORDER — KETOROLAC TROMETHAMINE 30 MG/ML
30 INJECTION, SOLUTION INTRAMUSCULAR; INTRAVENOUS ONCE
Status: COMPLETED | OUTPATIENT
Start: 2023-02-14 | End: 2023-02-14

## 2023-02-14 RX ORDER — PREDNISONE 10 MG/1
10 TABLET ORAL DAILY
Qty: 10 TABLET | Refills: 0 | Status: SHIPPED | OUTPATIENT
Start: 2023-02-14 | End: 2024-01-18 | Stop reason: ALTCHOICE

## 2023-02-14 RX ORDER — DEXAMETHASONE SODIUM PHOSPHATE 4 MG/ML
8 INJECTION, SOLUTION INTRA-ARTICULAR; INTRALESIONAL; INTRAMUSCULAR; INTRAVENOUS; SOFT TISSUE ONCE
Status: COMPLETED | OUTPATIENT
Start: 2023-02-14 | End: 2023-02-14

## 2023-02-14 RX ORDER — DICLOFENAC SODIUM 75 MG/1
75 TABLET, DELAYED RELEASE ORAL 2 TIMES DAILY
Qty: 30 TABLET | Refills: 1 | Status: SHIPPED | OUTPATIENT
Start: 2023-02-14

## 2023-02-14 RX ORDER — DEXAMETHASONE SODIUM PHOSPHATE 4 MG/ML
8 INJECTION, SOLUTION INTRA-ARTICULAR; INTRALESIONAL; INTRAMUSCULAR; INTRAVENOUS; SOFT TISSUE EVERY 24 HOURS
Status: DISCONTINUED | OUTPATIENT
Start: 2023-02-15 | End: 2023-02-14

## 2023-02-14 RX ADMIN — DEXAMETHASONE SODIUM PHOSPHATE 8 MG: 4 INJECTION, SOLUTION INTRA-ARTICULAR; INTRALESIONAL; INTRAMUSCULAR; INTRAVENOUS; SOFT TISSUE at 03:02

## 2023-02-14 RX ADMIN — KETOROLAC TROMETHAMINE 30 MG: 30 INJECTION, SOLUTION INTRAMUSCULAR; INTRAVENOUS at 03:02

## 2023-02-14 NOTE — PROGRESS NOTES
"Subjective:       Patient ID: Jonathan Hinton is a 73 y.o. female.    Chief Complaint: Follow-up and Hypertension (C/o right shoulder pain x1 month and has a knot)    Ms Hinton  is here with right shoulder pain for a few months. She states that she didn't have a specific injury, but her pain is getting worse with time. She complains of a "knot" on the right shoulder which Is overlying the AC joint. She has to use her left hand to support the right arm at the elbow when she is trying to lift something or perform overhead tasks. She does go to the gym 3 days a week, but now is only walking and jogging, as she can't do any exercises that involve the right shoulder    Follow-up  Associated symptoms include arthralgias, myalgias and neck pain.   Hypertension  Associated symptoms include neck pain.   Review of Systems   Constitutional:  Positive for unexpected weight change.        9# weight loss since august 2022    HENT: Negative.     Respiratory: Negative.     Cardiovascular: Negative.    Gastrointestinal: Negative.    Musculoskeletal:  Positive for arthralgias, myalgias and neck pain.        Right shoulder pain and scapular pain, decreased mobility, pain on flexion posteriorly and at AC joint, mild decrease in sensation in right hand at the ulnar distribution   Skin: Negative.    Psychiatric/Behavioral: Negative.       Patient Active Problem List   Diagnosis    Postmenopausal    Hyperglycemia    Hyperlipidemia    Hypovitaminosis D    Nodular thyroid disease    Essential hypertension    Osteopenia    Generalized osteoarthritis    Claudication    Abnormal finding on mammography    Abdominal pain    Aneurysm of renal artery    Disorder of bone and articular cartilage    Stricture of sigmoid colon    Chronic right shoulder pain       Objective:      Physical Exam  Vitals and nursing note reviewed.   Constitutional:       Appearance: Normal appearance. She is normal weight.   HENT:      Head: Normocephalic.      Nose: Nose " "normal.   Eyes:      Extraocular Movements: Extraocular movements intact.      Conjunctiva/sclera: Conjunctivae normal.      Pupils: Pupils are equal, round, and reactive to light.   Cardiovascular:      Rate and Rhythm: Normal rate and regular rhythm.      Heart sounds: Normal heart sounds. No murmur heard.  Pulmonary:      Effort: Pulmonary effort is normal. No respiratory distress.      Breath sounds: Normal breath sounds.   Musculoskeletal:         General: Tenderness present. Normal range of motion.      Cervical back: Normal range of motion and neck supple.      Comments: Right shoulder pain with decrease ability to elevate arm, to rotate, adduct and abduct. Pain at AC joint subscapular region    Skin:     General: Skin is warm and dry.   Neurological:      General: No focal deficit present.      Mental Status: She is alert and oriented to person, place, and time.   Psychiatric:         Mood and Affect: Mood normal.         Behavior: Behavior normal.       Lab Results   Component Value Date    WBC 4.66 11/08/2022    HGB 13.6 11/08/2022    HCT 42.2 11/08/2022     11/08/2022    CHOL 225 (H) 11/08/2022    TRIG 91 11/08/2022    HDL 72 11/08/2022    ALT 19 11/08/2022    AST 17 11/08/2022     11/08/2022    K 4.0 11/08/2022     11/08/2022    CREATININE 0.8 11/08/2022    BUN 16 11/08/2022    CO2 27 11/08/2022    TSH 0.184 (L) 01/05/2023    HGBA1C 5.3 05/10/2018     The 10-year ASCVD risk score (Joanna SIMS, et al., 2019) is: 15.5%    Values used to calculate the score:      Age: 73 years      Sex: Female      Is Non- : Yes      Diabetic: No      Tobacco smoker: No      Systolic Blood Pressure: 128 mmHg      Is BP treated: Yes      HDL Cholesterol: 72 mg/dL      Total Cholesterol: 225 mg/dL  Visit Vitals  /80 (BP Location: Right arm, Patient Position: Sitting, BP Method: Large (Manual))   Pulse 65   Temp 98.3 °F (36.8 °C)   Ht 5' 1" (1.549 m)   Wt 56.8 kg (125 lb 3.5 oz) "   SpO2 98%   BMI 23.66 kg/m²      Assessment:       1. Encounter for screening mammogram for breast cancer    2. Chronic right shoulder pain          Plan:       1. Encounter for screening mammogram for breast cancer  -     Mammo Digital Screening Bilat w/ Jose Ramon; Future; Expected date: 02/14/2023    2. Chronic right shoulder pain  Assessment & Plan:  Will do conservative therapy and reassess in 2-3 weeks, may need MRI    Orders:  -     X-ray Shoulder 2 or More Views Right; Future; Expected date: 02/14/2023    Other orders  -     dexAMETHasone injection 8 mg  -     ketorolac injection 30 mg  -     predniSONE (DELTASONE) 10 MG tablet; Take 1 tablet (10 mg total) by mouth once daily.  Dispense: 10 tablet; Refill: 0  -     diclofenac (VOLTAREN) 75 MG EC tablet; Take 1 tablet (75 mg total) by mouth 2 (two) times daily.  Dispense: 30 tablet; Refill: 1       Follow up in about 2 weeks (around 2/28/2023), or if symptoms worsen or fail to improve.      Future Appointments       Date Provider Specialty Appt Notes    2/15/2023  Radiology x    4/28/2023  Lab x    5/5/2023 DEEPA Centeno PA-C Endocrinology 4 month thyroid

## 2023-02-15 ENCOUNTER — HOSPITAL ENCOUNTER (OUTPATIENT)
Dept: RADIOLOGY | Facility: CLINIC | Age: 73
Discharge: HOME | End: 2023-02-15
Attending: FAMILY MEDICINE
Payer: MEDICARE

## 2023-02-15 ENCOUNTER — TELEPHONE (OUTPATIENT)
Dept: FAMILY MEDICINE | Facility: CLINIC | Age: 73
End: 2023-02-15

## 2023-02-15 DIAGNOSIS — M12.819 ROTATOR CUFF ARTHROPATHY, UNSPECIFIED LATERALITY: Primary | ICD-10-CM

## 2023-02-15 DIAGNOSIS — M25.511 CHRONIC RIGHT SHOULDER PAIN: ICD-10-CM

## 2023-02-15 DIAGNOSIS — G89.29 CHRONIC RIGHT SHOULDER PAIN: ICD-10-CM

## 2023-02-15 PROCEDURE — 73030 XR SHOULDER COMPLETE 2 OR MORE VIEWS RIGHT: ICD-10-PCS | Mod: 26,RT,, | Performed by: RADIOLOGY

## 2023-02-15 PROCEDURE — 73030 X-RAY EXAM OF SHOULDER: CPT | Mod: TC,RT,, | Performed by: FAMILY MEDICINE

## 2023-02-15 PROCEDURE — 73030 X-RAY EXAM OF SHOULDER: CPT | Mod: 26,RT,, | Performed by: RADIOLOGY

## 2023-02-15 PROCEDURE — 73030 XR SHOULDER COMPLETE 2 OR MORE VIEWS RIGHT: ICD-10-PCS | Mod: TC,RT,, | Performed by: FAMILY MEDICINE

## 2023-02-15 NOTE — TELEPHONE ENCOUNTER
----- Message from Nitzaregi Hernandezzandra sent at 2/15/2023  4:17 PM CST -----  Regarding: advise  Contact: Patient  Type: Needs Medical Advice  Who Called:  Patient  Symptoms (please be specific):    How long has patient had these symptoms:    Pharmacy name and phone #:    Best Call Back Number: [phone]     Additional Information:   Andry Zraate, I have Jonathan AhmadiN: [id number] returning a call. Are you available?

## 2023-02-17 DIAGNOSIS — M81.0 OSTEOPOROSIS, UNSPECIFIED OSTEOPOROSIS TYPE, UNSPECIFIED PATHOLOGICAL FRACTURE PRESENCE: Primary | ICD-10-CM

## 2023-02-17 RX ORDER — ALENDRONATE SODIUM 70 MG/1
TABLET ORAL
Qty: 4 TABLET | Refills: 11 | Status: SHIPPED | OUTPATIENT
Start: 2023-02-17 | End: 2024-03-19

## 2023-03-16 ENCOUNTER — OFFICE VISIT (OUTPATIENT)
Dept: SPORTS MEDICINE | Facility: CLINIC | Age: 73
End: 2023-03-16
Payer: MEDICARE

## 2023-03-16 VITALS — BODY MASS INDEX: 23.65 KG/M2 | WEIGHT: 125.25 LBS | HEIGHT: 61 IN

## 2023-03-16 DIAGNOSIS — G89.29 CHRONIC RIGHT SHOULDER PAIN: ICD-10-CM

## 2023-03-16 DIAGNOSIS — M12.811 ROTATOR CUFF ARTHROPATHY OF RIGHT SHOULDER: Primary | ICD-10-CM

## 2023-03-16 DIAGNOSIS — M25.511 CHRONIC RIGHT SHOULDER PAIN: ICD-10-CM

## 2023-03-16 PROCEDURE — 99204 PR OFFICE/OUTPT VISIT, NEW, LEVL IV, 45-59 MIN: ICD-10-PCS | Mod: ,,, | Performed by: FAMILY MEDICINE

## 2023-03-16 PROCEDURE — 3288F PR FALLS RISK ASSESSMENT DOCUMENTED: ICD-10-PCS | Mod: CPTII,,, | Performed by: FAMILY MEDICINE

## 2023-03-16 PROCEDURE — 1159F PR MEDICATION LIST DOCUMENTED IN MEDICAL RECORD: ICD-10-PCS | Mod: CPTII,,, | Performed by: FAMILY MEDICINE

## 2023-03-16 PROCEDURE — 1159F MED LIST DOCD IN RCRD: CPT | Mod: CPTII,,, | Performed by: FAMILY MEDICINE

## 2023-03-16 PROCEDURE — 99204 OFFICE O/P NEW MOD 45 MIN: CPT | Mod: ,,, | Performed by: FAMILY MEDICINE

## 2023-03-16 PROCEDURE — 3288F FALL RISK ASSESSMENT DOCD: CPT | Mod: CPTII,,, | Performed by: FAMILY MEDICINE

## 2023-03-16 PROCEDURE — 4010F ACE/ARB THERAPY RXD/TAKEN: CPT | Mod: CPTII,,, | Performed by: FAMILY MEDICINE

## 2023-03-16 PROCEDURE — 1126F AMNT PAIN NOTED NONE PRSNT: CPT | Mod: CPTII,,, | Performed by: FAMILY MEDICINE

## 2023-03-16 PROCEDURE — 3008F BODY MASS INDEX DOCD: CPT | Mod: CPTII,,, | Performed by: FAMILY MEDICINE

## 2023-03-16 PROCEDURE — 1101F PR PT FALLS ASSESS DOC 0-1 FALLS W/OUT INJ PAST YR: ICD-10-PCS | Mod: CPTII,,, | Performed by: FAMILY MEDICINE

## 2023-03-16 PROCEDURE — 1126F PR PAIN SEVERITY QUANTIFIED, NO PAIN PRESENT: ICD-10-PCS | Mod: CPTII,,, | Performed by: FAMILY MEDICINE

## 2023-03-16 PROCEDURE — 4010F PR ACE/ARB THEARPY RXD/TAKEN: ICD-10-PCS | Mod: CPTII,,, | Performed by: FAMILY MEDICINE

## 2023-03-16 PROCEDURE — 3008F PR BODY MASS INDEX (BMI) DOCUMENTED: ICD-10-PCS | Mod: CPTII,,, | Performed by: FAMILY MEDICINE

## 2023-03-16 PROCEDURE — 1101F PT FALLS ASSESS-DOCD LE1/YR: CPT | Mod: CPTII,,, | Performed by: FAMILY MEDICINE

## 2023-03-16 RX ORDER — MELOXICAM 15 MG/1
15 TABLET ORAL DAILY PRN
Qty: 30 TABLET | Refills: 2 | Status: SHIPPED | OUTPATIENT
Start: 2023-03-16 | End: 2023-06-27

## 2023-03-16 NOTE — PROGRESS NOTES
Subjective:      Patient ID: Jonathan Hinton is a 73 y.o. female.    Chief Complaint: Pain and Initial Visit of the Right Shoulder (Right Shoulder Pain for a few months. No Recent Injury. )    Patient presents today with complaints of chronic right shoulder pain that has been present now for several months.  No injury known to the shoulder region.  She states she is not having pain today.  States the pain is sporadic and she is not able to tell what seems to make it hurt.  It seems to be very random.  She last had pain in the shoulder two days ago.  She states she has limited range of motion of the right shoulder and she believes her strength is compromised.  Has difficulty reaching above her head or holding objects.  Her main concern is her loss of function of the right shoulder.    Pain  Pertinent negatives include no chest pain, chills, congestion, coughing, headaches, rash or sore throat.     Review of Systems   Constitutional: Negative for chills and decreased appetite.   HENT:  Negative for congestion and sore throat.    Eyes:  Negative for blurred vision.   Cardiovascular:  Negative for chest pain, dyspnea on exertion and palpitations.   Respiratory:  Negative for cough and shortness of breath.    Skin:  Negative for rash.   Neurological:  Negative for difficulty with concentration, disturbances in coordination and headaches.   Psychiatric/Behavioral:  Negative for altered mental status, depression, hallucinations, memory loss and suicidal ideas.        Objective:            General    Nursing note and vitals reviewed.  Constitutional: She is oriented to person, place, and time. She appears well-developed and well-nourished.   HENT:   Nose: Nose normal.   Eyes: EOM are normal. Pupils are equal, round, and reactive to light.   Neck: Neck supple.   Cardiovascular:  Normal rate.            Pulmonary/Chest: Effort normal.   Abdominal: Soft.   Neurological: She is alert and oriented to person, place, and time. She  has normal reflexes.   Psychiatric: She has a normal mood and affect. Her behavior is normal. Judgment and thought content normal.         Right Shoulder Exam     Inspection/Observation   Swelling: absent  Bruising: absent  Scars: absent  Deformity: absent  Scapular Winging: absent  Scapular Dyskinesia: negative    Tenderness   The patient is tender to palpation of the acromioclavicular joint and supraspinatus.    Range of Motion   Active abduction:  150   Passive abduction:  normal   Extension:  normal   Forward Flexion:  150   Forward Elevation: normal  Adduction: 90     Tests & Signs   Eason test: positive  Impingement: negative  Active Compression Test (Spalding's Sign): negative  Speed's Test: negative  Anterior Drawer Test: 0   Posterior Drawer Test: 0    Other   Sensation: normal    Left Shoulder Exam   Left shoulder exam is normal.       Muscle Strength   Right Upper Extremity   Shoulder Abduction: 5/5   Shoulder Internal Rotation: 5/5   Shoulder External Rotation: 3/5   Supraspinatus: 3/5   Subscapularis: 5/5   Biceps: 5/5     Vascular Exam     Right Pulses      Radial:                    2+    I reviewed the x-rays ordered in February by PCP.  They show a very high-riding humeral head which is very suspicious for rotator cuff pathology.  Some degenerative changes of the AC joint and some mild degenerative changes in the glenohumeral joint.  Surgical clips noted in the neck suggestive of a past carotid endarterectomy.  No acute fractures are present.          Assessment:       Encounter Diagnoses   Name Primary?    Rotator cuff arthropathy of right shoulder Yes    Chronic right shoulder pain           Plan:       Jonathan was seen today for pain and initial visit.    Diagnoses and all orders for this visit:    Rotator cuff arthropathy of right shoulder  -     Ambulatory referral/consult to Orthopedics    Chronic right shoulder pain  -     MRI Shoulder Without Contrast Right; Future    Other orders  -      meloxicam (MOBIC) 15 MG tablet; Take 1 tablet (15 mg total) by mouth daily as needed for Pain.      Discussed treatment options with her.  They would include nonoperative treatments of oral medications or steroid injections and physical therapy.  If she would prefer possibility of operative repair that suggested getting an MRI of her right shoulder.  Patient is not interested in conservative measures and said she would like the problem fixed permanently.  I will order an MRI of her right shoulder and prescribed meloxicam to be used as needed for pain and inflammation in the meantime.  Once her follow-up to review MRI once completed.

## 2023-03-30 ENCOUNTER — HOSPITAL ENCOUNTER (OUTPATIENT)
Dept: RADIOLOGY | Facility: HOSPITAL | Age: 73
Discharge: HOME | End: 2023-03-30
Attending: FAMILY MEDICINE
Payer: MEDICARE

## 2023-03-30 DIAGNOSIS — M25.511 CHRONIC RIGHT SHOULDER PAIN: ICD-10-CM

## 2023-03-30 DIAGNOSIS — G89.29 CHRONIC RIGHT SHOULDER PAIN: ICD-10-CM

## 2023-03-30 PROCEDURE — 73221 MRI SHOULDER WITHOUT CONTRAST RIGHT: ICD-10-PCS | Mod: 26,RT,, | Performed by: RADIOLOGY

## 2023-03-30 PROCEDURE — 73221 MRI JOINT UPR EXTREM W/O DYE: CPT | Mod: 26,RT,, | Performed by: RADIOLOGY

## 2023-03-30 PROCEDURE — 73221 MRI JOINT UPR EXTREM W/O DYE: CPT | Mod: TC,RT

## 2023-03-31 ENCOUNTER — OFFICE VISIT (OUTPATIENT)
Dept: ORTHOPEDICS | Facility: CLINIC | Age: 73
End: 2023-03-31
Payer: MEDICARE

## 2023-03-31 DIAGNOSIS — M19.211 SECONDARY OSTEOARTHRITIS OF RIGHT SHOULDER DUE TO ROTATOR CUFF ARTHROPATHY: Primary | ICD-10-CM

## 2023-03-31 PROCEDURE — 20610 LARGE JOINT ASPIRATION/INJECTION: R SUBACROMIAL BURSA: ICD-10-PCS | Mod: RT,S$GLB,, | Performed by: ORTHOPAEDIC SURGERY

## 2023-03-31 PROCEDURE — 99999 PR PBB SHADOW E&M-EST. PATIENT-LVL II: CPT | Mod: PBBFAC,,, | Performed by: ORTHOPAEDIC SURGERY

## 2023-03-31 PROCEDURE — 1159F MED LIST DOCD IN RCRD: CPT | Mod: CPTII,S$GLB,, | Performed by: ORTHOPAEDIC SURGERY

## 2023-03-31 PROCEDURE — 99204 PR OFFICE/OUTPT VISIT, NEW, LEVL IV, 45-59 MIN: ICD-10-PCS | Mod: 25,S$GLB,, | Performed by: ORTHOPAEDIC SURGERY

## 2023-03-31 PROCEDURE — 1159F PR MEDICATION LIST DOCUMENTED IN MEDICAL RECORD: ICD-10-PCS | Mod: CPTII,S$GLB,, | Performed by: ORTHOPAEDIC SURGERY

## 2023-03-31 PROCEDURE — 4010F ACE/ARB THERAPY RXD/TAKEN: CPT | Mod: CPTII,S$GLB,, | Performed by: ORTHOPAEDIC SURGERY

## 2023-03-31 PROCEDURE — 1125F PR PAIN SEVERITY QUANTIFIED, PAIN PRESENT: ICD-10-PCS | Mod: CPTII,S$GLB,, | Performed by: ORTHOPAEDIC SURGERY

## 2023-03-31 PROCEDURE — 1125F AMNT PAIN NOTED PAIN PRSNT: CPT | Mod: CPTII,S$GLB,, | Performed by: ORTHOPAEDIC SURGERY

## 2023-03-31 PROCEDURE — 20610 DRAIN/INJ JOINT/BURSA W/O US: CPT | Mod: RT,S$GLB,, | Performed by: ORTHOPAEDIC SURGERY

## 2023-03-31 PROCEDURE — 99999 PR PBB SHADOW E&M-EST. PATIENT-LVL II: ICD-10-PCS | Mod: PBBFAC,,, | Performed by: ORTHOPAEDIC SURGERY

## 2023-03-31 PROCEDURE — 99204 OFFICE O/P NEW MOD 45 MIN: CPT | Mod: 25,S$GLB,, | Performed by: ORTHOPAEDIC SURGERY

## 2023-03-31 PROCEDURE — 4010F PR ACE/ARB THEARPY RXD/TAKEN: ICD-10-PCS | Mod: CPTII,S$GLB,, | Performed by: ORTHOPAEDIC SURGERY

## 2023-03-31 RX ORDER — TRIAMCINOLONE ACETONIDE 40 MG/ML
40 INJECTION, SUSPENSION INTRA-ARTICULAR; INTRAMUSCULAR
Status: DISCONTINUED | OUTPATIENT
Start: 2023-03-31 | End: 2023-03-31 | Stop reason: HOSPADM

## 2023-03-31 RX ADMIN — TRIAMCINOLONE ACETONIDE 40 MG: 40 INJECTION, SUSPENSION INTRA-ARTICULAR; INTRAMUSCULAR at 01:03

## 2023-03-31 NOTE — PROGRESS NOTES
Subjective:      Patient ID: Jonathan Hinton is a 73 y.o. female.    Chief Complaint: Pain and Results of the Right Shoulder    HPI  73-year-old female with three-month history somewhat progressive right shoulder pain.  Denies any trauma.  She is having pain with lying on her right side doing any overhead activities heavy lifting or repetitive use of her shoulder.  She is had no specific treatment for this.  She takes some over-the-counter Tylenol with some mild improvement in her pain.  ROS      Objective:    Ortho Exam     Constitutional:   Patient is alert  and oriented in no acute distress  HEENT:  normocephalic atraumatic; PERRL EOMI  Neck:  Supple without adenopathy  Cardiovascular:  Normal rate and rhythm  Pulmonary:  Normal respiratory effort normal chest wall expansion  Abdominal:  Nonprotuberant nondistended  Musculoskeletal:  Patient lacks few degrees of forward flexion internal rotation external rotation of her shoulder.  There is crepitus with range of motion.  She is mild weakness in external rotation overall is fairly comfortable with her what appears to be a very functional range of motion.  Neurological:  No focal defect; cranial nerves 2-12 grossly intact  Psychiatric/behavioral:  Mood and behavior normal      MRI Shoulder Without Contrast Right  Narrative: EXAMINATION:  MRI SHOULDER WITHOUT CONTRAST RIGHT    CLINICAL HISTORY:  Shoulder pain, rotator cuff disorder suspected, xray done;  Pain in right shoulder    TECHNIQUE:  Multiplanar, multisequence images were performed through the right shoulder.    COMPARISON:  02/15/2023    FINDINGS:  Bone: There are degenerative cysts at several sites along the glenoid with some glenoid remodeling.  No AVN or acute fracture.    Glenohumeral joint:The humeral head is high-riding and slightly posterior subluxed as well.  There is a glenohumeral joint effusion with synovitis in the axillary recess.  There are areas of partial to near full-thickness chondral loss  along the inferomedial humeral head with a spur.  There are areas of partial to near full-thickness chondral loss along the central and posterior glenoid.    Acromioclavicular joint:Moderate degenerative change.  There is fluid in the subacromial subdeltoid bursa which freely communicates with the glenohumeral joint.    Supraspinatus:There is a complete tear from the insertion with retraction to the level of the glenoid.  Some retracted fibers lie anterior.  Retraction is a distance estimated between 3.2 and 4 cm.  There is mild muscle atrophy.    Infraspinatus:Full-thickness tear anterior and mid fibers with retraction to the level of the medial humeral dome a distance of approximately 2.5 cm.  A few remnant posterior fibers insert normally.  Moderate muscle atrophy.    Subscapularis:Unremarkable.    Teres minor:Unremarkable.    Biceps: Poorly visualized proximal biceps tendon.    Labrum: Assessment limited without intra-articular distension.  There is attenuation and intrasubstance signal in the posterosuperior labrum from the 9 to 11 o'clock position.    Soft tissues: No mass or focal fluid.    Miscellaneous:  Impression: 1. Full-thickness, full width tear of supraspinatus from the insertion, with retraction.  2. Full-thickness, near full width tear of infraspinatus from the insertion, with retraction of the torn fibers.  Suspect a few intact posterior fibers remain at insertion.  3. Nonvisualized biceps tendon suspicious for tear in the proximal segment with distal retraction.  4. Moderate degenerative change AC and glenohumeral joints.  5. Degenerative tear/fraying within the posterosuperior labral segment.    Electronically signed by: Sohan Chung  Date:    03/30/2023  Time:    14:14       My Findings:    I have personally reviewed radiographs and concur with above findings    Assessment:       Encounter Diagnosis   Name Primary?    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  I have explained that based on her radiographic and MRI findings she has a very functional shoulder she declines any consideration of the present time for any surgical consideration.  She is agreeable to an injection today.  That was done today after verbal consent and sterile prep without complication.  She is also agreeable to some physical therapy.  We will give her a prescription for meloxicam GI cardiac and renal precautions were discussed if approved by her PCP P.  Follow up in 6 weeks if symptoms fail to improve sooner if any questions or problems.        Past Medical History:   Diagnosis Date    Hyperlipidemia     Hypertension     Hyperthyroidism      Past Surgical History:   Procedure Laterality Date    COLONOSCOPY      THYROIDECTOMY Bilateral 7/14/2022    Procedure: THYROIDECTOMY;  Surgeon: Rafy Cannon MD;  Location: [facility];  Service: ENT;  Laterality: Bilateral;    TUBAL LIGATION      WISDOM TOOTH EXTRACTION           Current Outpatient Medications:     alendronate (FOSAMAX) 70 MG tablet, Take one tablet weekly., Disp: 4 tablet, Rfl: 11    diclofenac (VOLTAREN) 75 MG EC tablet, Take 1 tablet (75 mg total) by mouth 2 (two) times daily., Disp: 30 tablet, Rfl: 1    levothyroxine (SYNTHROID) 75 MCG tablet, Take 1 tablet (75 mcg total) by mouth before breakfast., Disp: 90 tablet, Rfl: 3    lisinopriL-hydrochlorothiazide (PRINZIDE,ZESTORETIC) 10-12.5 mg per tablet, TAKE 1 TABLET DAILY, Disp: 90 tablet, Rfl: 3    meloxicam (MOBIC) 15 MG tablet, Take 1 tablet (15 mg total) by mouth daily as needed for Pain., Disp: 30 tablet, Rfl: 2    predniSONE (DELTASONE) 10 MG tablet, Take 1 tablet (10 mg total) by mouth once daily., Disp: 10 tablet, Rfl: 0    simvastatin (ZOCOR) 40 MG tablet, TAKE 1 TABLET EVERY EVENING, Disp: 90 tablet, Rfl: 3    Review of patient's allergies indicates:  No Known  Allergies    History reviewed. No pertinent family history.  Social History     Occupational History    Not on file   Tobacco Use    Smoking status: Former     Packs/day: 0.50     Types: Cigarettes     Quit date: 2021     Years since quittin.7    Smokeless tobacco: Former   Substance and Sexual Activity    Alcohol use: Not Currently    Drug use: Never    Sexual activity: Not Currently

## 2023-03-31 NOTE — PROCEDURES
Large Joint Aspiration/Injection: R subacromial bursa    Date/Time: 3/31/2023 1:30 PM  Performed by: Rodríguez Wilson MD  Authorized by: Rodríguez Wilson MD     Consent Done?:  Yes (Verbal)  Indications:  Pain  Site marked: the procedure site was marked    Timeout: prior to procedure the correct patient, procedure, and site was verified    Local anesthetic:  Lidocaine 1% without epinephrine and bupivacaine 0.25% without epinephrine  Anesthetic total (ml):  6      Details:  Needle Size:  20 G  Ultrasonic Guidance for needle placement?: No    Approach:  Posterior  Location:  Shoulder  Site:  R subacromial bursa  Medications:  40 mg triamcinolone acetonide 40 mg/mL  Patient tolerance:  Patient tolerated the procedure well with no immediate complications

## 2023-04-17 ENCOUNTER — CLINICAL SUPPORT (OUTPATIENT)
Dept: REHABILITATION | Facility: HOSPITAL | Age: 73
End: 2023-04-17
Attending: ORTHOPAEDIC SURGERY
Payer: MEDICARE

## 2023-04-17 DIAGNOSIS — G89.29 CHRONIC RIGHT SHOULDER PAIN: ICD-10-CM

## 2023-04-17 DIAGNOSIS — Z78.9 SELF-CARE DEFICIT: Primary | ICD-10-CM

## 2023-04-17 DIAGNOSIS — M19.211 SECONDARY OSTEOARTHRITIS OF RIGHT SHOULDER DUE TO ROTATOR CUFF ARTHROPATHY: ICD-10-CM

## 2023-04-17 DIAGNOSIS — R68.89 ACTIVITY INTOLERANCE: ICD-10-CM

## 2023-04-17 DIAGNOSIS — M25.511 CHRONIC RIGHT SHOULDER PAIN: ICD-10-CM

## 2023-04-17 PROCEDURE — 97140 MANUAL THERAPY 1/> REGIONS: CPT | Mod: PN

## 2023-04-17 PROCEDURE — 97162 PT EVAL MOD COMPLEX 30 MIN: CPT | Mod: PN

## 2023-04-17 NOTE — PLAN OF CARE
"[facility]   Physical Therapy Initial Evaluation       Name: Jonathan Hinton  Clinic Number: [id number]    Therapy Diagnosis:   Encounter Diagnoses   Name Primary?    Self-care deficit Yes    Activity intolerance     Chronic right shoulder pain     Secondary osteoarthritis of right shoulder due to rotator cuff arthropathy         Physician: Rodríguez Wilson,*    Physician Orders: PT Eval and Treat   Medical Diagnosis from Referral: Secondary osteoarthritis of right shoulder due to rotator cuff arthropathy   Evaluation Date: 4/17/2023  Authorization Period Expiration: 05/17/2023   Plan of Care Expiration: 5/31/2023  Progress Note Due: 5/17/2023  Visit # / Visits authorized: 1/ 1   FOTO: Next survey due week of 5/1/2023    Precautions: Standard     Time In: 1250  Time Out: 1328  Total Appointment Time (timed & untimed codes): 38 minutes      SUBJECTIVE     Date of onset: < 6 months    History of current condition - Jonathan reports: She developed insidious onset R shoulder pain "a couple months ago".  Symptoms have not changed much since onset.  She saw her primary care physician in February who referred her to ortho.  MRI studies have shown significant OA as well as prior rotator cuff tears.  She has been prescribed medication which has helped.  She is now being referred to PT    Falls: No    Imaging: MRI studies: R [id number]/30/2023.  Impression per report:  "1. Full-thickness, full width tear of supraspinatus from the insertion, with retraction.  2. Full-thickness, near full width tear of infraspinatus from the insertion, with retraction of the torn fibers.  Suspect a few intact posterior fibers remain at insertion.  3. Nonvisualized biceps tendon suspicious for tear in the proximal segment with distal retraction.  4. Moderate degenerative change AC and glenohumeral joints.  5. Degenerative tear/fraying within the posterosuperior labral segment."    Prior Therapy: No   Social " "History:  lives with their family "take care of my mom and sister"  Occupation: Retired  Prior Level of Function: No limitation in reaching or performing hair dressing activities.   Current Level of Function: Difficulty reaching above shoulder level, some difficulty with hair dressing.  Patient is right handed     Pain:  Current 0/10, worst 4/10, best 0/10   Location: right posterior shoulder   Description: intermittent aching  Aggravating Factors: reaching (all directions), hair dressing.  Easing Factors: heating pad    Patients goals: To relieve the pain     Medical History:   Past Medical History:   Diagnosis Date    Hyperlipidemia     Hypertension     Hyperthyroidism        Surgical History:   Jonathan Hinton  has a past surgical history that includes [place] tooth extraction; Colonoscopy; Tubal ligation; and Thyroidectomy (Bilateral, 7/14/2022).    Medications:   Jonathan has a current medication list which includes the following prescription(s): alendronate, diclofenac, levothyroxine, lisinopril-hydrochlorothiazide, meloxicam, prednisone, simvastatin, and [DISCONTINUED] methimazole.    Allergies:   Review of patient's allergies indicates:  No Known Allergies       OBJECTIVE     Posture: Standing:  Shoulders are level, minimal rounded shoulder posture.  Sitting: flattened lumbar lordosis, increased thoracic kyphosis, moderate rounded shoulder and forward head posture  Palpation: Tenderness present R posterior deltoid region, R upper traps, over R brachioradialis.  Increased muscle tension R upper traps and levator scapula  Sensation: No deficits reported this date.   DTRs: N/A  Range of Motion/Strength:   Cervical ROM grossly WFL with R shoulder discomfort at end range R side bend.    Shoulder  Right   Left  Pain/Dysfunction with Movement    AROM PROM MMT AROM PROM MMT    flexion  135*  150*  3+/5-4-/5  160*  175*  4/5    extension  65*  80*  4/5  65*  80*  4/5    abduction  130*  150*   4-/5  160*  180*  4/5  " Grimace noted with R shoulder PROM   adduction  35*  40*  4/5  35*  40*  4/5  Grimace noted with R shoulder AROM   Internal rotation  60*  65*  4-/5  70*  85*  4/5    ER at 90° abd  65*  80*  3/5  70*  85*  4/5    ER at 0° abd  10*  30*  3+/5  55*  75*  4/5    Elbows: ROM grossly WNL, strength 4/5-4+/5  Flexibility: Moderate tightness B upper traps, levator scap, and pectoralis muscles.   Gait: Without AD  Analysis: no significant deviations noted this date.   Bed Mobility:Independent with guarding of R UE noted  Transfers: Independent  Special Tests: Joint play: Decreased R GH anterior to posterior glide and superior to inferior glide  Other: N/A       Limitation/Restriction for FOTO Shoulder Survey    Therapist reviewed FOTO scores for Jonathan Hinton on 4/17/2023.   FOTO documents entered into [employer] - see Media section.    Limitation Score: 36%         TREATMENT     Total Treatment time (time-based codes) separate from Evaluation: 10 minutes      Jonathan received the treatments listed below:      manual therapy techniques: Joint mobilizations and PROM were applied to the: R shoulder for 10 minutes, including:  Grade II superior to inferior R GH glides and anterior to inferior glides  PROM to R shoulder in supine  Scapular scouring in L sidelying    PATIENT EDUCATION AND HOME EXERCISES     Education provided:   - Discussed role of PT and proposed POC.      Written Home Exercises Provided:  To be issued during subsequent visits . Exercises were reviewed and Jonathan was able to demonstrate them prior to the end of the session.  Jonathan demonstrated fair  understanding of the education provided. See EMR under Patient Instructions for exercises provided during therapy sessions.    ASSESSMENT     Jonathan is a 73 y.o. female referred to outpatient Physical Therapy with a medical diagnosis of Secondary osteoarthritis of right shoulder due to rotator cuff arthropathy. Patient presents with intermittent R shoulder pain,  decreased R shoulder ROM, impaired joint play, decreased UE strength, impaired posture, increased tenderness, and decreased upper quadrant flexibility which contributes to impaired functional use of R UE for reaching and hair dressing activities.  She should benefit from skilled PT services to address these problems in order to minimize functional deficit and resume her usual activities.    Patient prognosis is Fair.   Patient will benefit from skilled outpatient Physical Therapy to address the deficits stated above and in the chart below, provide patient /family education, and to maximize patientt's level of independence.     Plan of care discussed with patient: Yes  Patient's spiritual, cultural and educational needs considered and patient is agreeable to the plan of care and goals as stated below:     Anticipated Barriers for therapy: Postural changes may interfere with potential for progress.     Medical Necessity is demonstrated by the following  History  Co-morbidities and personal factors that may impact the plan of care Co-morbidities:   advanced age and HTN    Personal Factors:   age     moderate   Examination  Body Structures and Functions, activity limitations and participation restrictions that may impact the plan of care Body Regions:   neck  upper extremities    Body Systems:    gross symmetry  ROM  strength    Participation Restrictions:   None anticipated.     Activity limitations:   Learning and applying knowledge  no deficits    General Tasks and Commands  no deficits    Communication  no deficits    Mobility  lifting and carrying objects    Self care  washing oneself (bathing, drying, washing hands)  dressing    Domestic Life  shopping  doing house work (cleaning house, washing dishes, laundry)    Interactions/Relationships  no deficits    Life Areas  no deficits    Community and Social Life  community life  recreation and leisure         moderate   Clinical Presentation evolving clinical  presentation with changing clinical characteristics moderate   Decision Making/ Complexity Score: moderate     Goals:  Short Term Goals: 3 weeks   1) Facilitate improved joint play  2) Facilitate improved upper quadrant flexibility  3) Facilitate improved posture  4) Patient will perform hair care activities > 5 min without increased pain/discomfort 5 of 10 trials    Long Term Goals: 6 weeks   1) Patient will consistently perform hair care activities without increased pain/discomfort  2) Patient will consistently reach to at least 2nd shelf in cabinet using her R UE without difficulty  3) Patient will consistently reach behind her (as in reaching into backseat of car) without increased pain/difficulty   4) Improve functional deficit to < 30% as indicated by FOTO shoulder survey  5) Patient will be independent in complete HEP for shoulder flexibility, ROM, and strength.     PLAN   Plan of care Certification: 4/17/2023 to 5/31/2023.    Outpatient Physical Therapy 2 times weekly for 6 weeks to include the following interventions: Electrical Stimulation IFC/TENS, Manual Therapy, Moist Heat/ Ice, Patient Education, Therapeutic Activities, Therapeutic Exercise, and Functional Dry Needling .     Maddi Davalos, PT      I CERTIFY THE NEED FOR THESE SERVICES FURNISHED UNDER THIS PLAN OF TREATMENT AND WHILE UNDER MY CARE   Physician's comments:     Physician's Signature: ___________________________________________________

## 2023-04-25 ENCOUNTER — CLINICAL SUPPORT (OUTPATIENT)
Dept: REHABILITATION | Facility: HOSPITAL | Age: 73
End: 2023-04-25
Payer: MEDICARE

## 2023-04-25 DIAGNOSIS — M25.511 CHRONIC RIGHT SHOULDER PAIN: ICD-10-CM

## 2023-04-25 DIAGNOSIS — Z78.9 SELF-CARE DEFICIT: Primary | ICD-10-CM

## 2023-04-25 DIAGNOSIS — M19.211 SECONDARY OSTEOARTHRITIS OF RIGHT SHOULDER DUE TO ROTATOR CUFF ARTHROPATHY: ICD-10-CM

## 2023-04-25 DIAGNOSIS — R68.89 ACTIVITY INTOLERANCE: ICD-10-CM

## 2023-04-25 DIAGNOSIS — G89.29 CHRONIC RIGHT SHOULDER PAIN: ICD-10-CM

## 2023-04-25 PROCEDURE — 97140 MANUAL THERAPY 1/> REGIONS: CPT | Mod: PN,CQ

## 2023-04-25 PROCEDURE — 97110 THERAPEUTIC EXERCISES: CPT | Mod: PN,CQ

## 2023-04-25 NOTE — PROGRESS NOTES
"[facility]   Physical Therapy Treatment Note     Name: Jonathan Hinton  Clinic Number: [id number]    Therapy Diagnosis:   Encounter Diagnoses   Name Primary?    Self-care deficit Yes    Activity intolerance     Chronic right shoulder pain     Secondary osteoarthritis of right shoulder due to rotator cuff arthropathy      Physician: Rodríguez Wilson,*    Visit Date: 4/25/2023    Physician Orders: PT Eval and Treat   Medical Diagnosis from Referral: Secondary osteoarthritis of right shoulder due to rotator cuff arthropathy   Evaluation Date: 4/17/2023  Authorization Period Expiration: 05/17/2023   Plan of Care Expiration: 5/31/2023  Progress Note Due: 5/17/2023  Visit # / Visits authorized: 1/ 20 (2 total)   FOTO: Next survey due week of 5/1/2023     Precautions: Standard     PTA Visit #: 1/5     Time In: 0808  Time Out: 0855  Total Billable Time: 47 minutes    SUBJECTIVE     Pt reports: "It just comes on sometimes."  She was not compliant with home exercise program because one has not been issued prior.  Response to previous treatment: "good, real good"  Functional change: N/A at this time    Pain: 0/10  Location: right shoulder      OBJECTIVE     Objective Measures updated at progress report unless specified.     Treatment     Jonathan received the treatments listed below:      therapeutic exercises to develop strength, endurance, ROM, flexibility, and posture for 37 minutes including:  Pulley AAROM x1' ea   Flexion   Abduction   Extension   Internal rotation  Seated upper trapezius stretch 3x30s ea   Levator scapulae stretch 3x30s ea   Middle trapezius/Rhomboid stretch 3x30s  Standing doorway pectoral stretch 3x30s  1# Therabar AAROM x10 ea    R flexion    Abduction    External rotation    Extension    Internal rotation   Wall wipes x10 ea    Flexion    Abduction   Yellow Theraband resisted postural exercise x10    Rows w/scapular retractions    B extension w/scapular retractions    B " "external rotation w/scapular retractions    To be added:   UBE, wall wipe arcs    manual therapy techniques: Soft tissue mobilizations, Joint mobilizations, Passive range of motion, and Scapular scouring were applied to the: R shoulder for 10 minutes, including:  Grade II superior to inferior R GH glides and anterior to posterior glides  PROM to R shoulder in supine  Scapular scouring in L sidelying  STM: strumming to posterior shoulder & bicep      Patient Education and Home Exercises     Home Exercises Provided and Patient Education Provided     Education provided:   - The patient was instructed in initial therapeutic treatment program as stated above.    Written Home Exercises Provided:  The patient is to be issued initial home exercise program in subsequent sessions . Exercises were reviewed and Jonathan was able to demonstrate them prior to the end of the session.  Jonathan demonstrated fair  understanding of the education provided. See EMR under Patient Instructions for exercises provided during therapy sessions    ASSESSMENT     The patient reported no pain upon entering clinic, as well as throughout treatment. Patent reports when pain is experienced, it is in posterior shoulder. Jonathan Hinton was instructed in initial therapeutic treatment program as stated above in order to increase shoulder range of motion, strength, stability, flexibility, and overall upper extremity function. Patient performed exercises well once demonstrated to and reviewed, needing tactile, verbal, and visual cues only with active assistive external rotation with Therabar. Manual techniques were provided with patient reporting "feels good" post. Increased tension noted in bicep on affected upper extremity. Due to lack of pain, patient deferred modalities post.    Jonathan Is progressing well towards her goals.   Pt prognosis is Fair.     Pt will continue to benefit from skilled outpatient physical therapy to address the deficits listed " in the problem list box on initial evaluation, provide pt/family education and to maximize pt's level of independence in the home and community environment.     Pt's spiritual, cultural and educational needs considered and pt agreeable to plan of care and goals.     Anticipated barriers to physical therapy: Postural changes may interfere with potential for progress.     Goals:  Short Term Goals: 3 weeks   1) Facilitate improved joint play Initiated/Ongoing  2) Facilitate improved upper quadrant flexibility Initiated/Ongoing   3) Facilitate improved posture Initiated/Ongoing  4) Patient will perform hair care activities > 5 min without increased pain/discomfort 5 of 10 trials Ongoing     Long Term Goals: 6 weeks   1) Patient will consistently perform hair care activities without increased pain/discomfort Initiated/Ongoing  2) Patient will consistently reach to at least 2nd shelf in cabinet using her R UE without difficulty Initiated/Ongoing  3) Patient will consistently reach behind her (as in reaching into backseat of car) without increased pain/difficulty Initiated/Ongoing  4) Improve functional deficit to < 30% as indicated by FOTO shoulder survey Ongoing  5) Patient will be independent in complete HEP for shoulder flexibility, ROM, and strength. Ongoing    PLAN     POC: Outpatient Physical Therapy 2 times weekly for 6 weeks to include the following interventions: Electrical Stimulation IFC/TENS, Manual Therapy, Moist Heat/ Ice, Patient Education, Therapeutic Activities, Therapeutic Exercise, and Functional Dry Needling .     The patient is to be progressed within the established plan of care as tolerated in order to accomplish goals as stated above. Plan to incorporate UBE and wall wipe arcs in subsequent session. Will provide formal written home exercise program with resistance band(s) in subsequent session(s).    Daina Del Cid, PTA

## 2023-04-27 ENCOUNTER — CLINICAL SUPPORT (OUTPATIENT)
Dept: REHABILITATION | Facility: HOSPITAL | Age: 73
End: 2023-04-27
Payer: MEDICARE

## 2023-04-27 DIAGNOSIS — Z78.9 SELF-CARE DEFICIT: Primary | ICD-10-CM

## 2023-04-27 DIAGNOSIS — M19.211 SECONDARY OSTEOARTHRITIS OF RIGHT SHOULDER DUE TO ROTATOR CUFF ARTHROPATHY: ICD-10-CM

## 2023-04-27 DIAGNOSIS — R68.89 ACTIVITY INTOLERANCE: ICD-10-CM

## 2023-04-27 DIAGNOSIS — G89.29 CHRONIC RIGHT SHOULDER PAIN: ICD-10-CM

## 2023-04-27 DIAGNOSIS — M25.511 CHRONIC RIGHT SHOULDER PAIN: ICD-10-CM

## 2023-04-27 PROCEDURE — 97110 THERAPEUTIC EXERCISES: CPT | Mod: PN

## 2023-04-27 PROCEDURE — 97140 MANUAL THERAPY 1/> REGIONS: CPT | Mod: PN

## 2023-04-27 NOTE — PROGRESS NOTES
[facility]   Physical Therapy Treatment Note     Name: Jonathan Hinton  Clinic Number: [id number]    Therapy Diagnosis:   Encounter Diagnoses   Name Primary?    Self-care deficit Yes    Activity intolerance     Chronic right shoulder pain     Secondary osteoarthritis of right shoulder due to rotator cuff arthropathy      Physician: Rodríguez Wilson,*    Visit Date: 4/27/2023    Physician Orders: PT Eval and Treat   Medical Diagnosis from Referral: Secondary osteoarthritis of right shoulder due to rotator cuff arthropathy   Evaluation Date: 4/17/2023  Authorization Period Expiration: 05/17/2023   Plan of Care Expiration: 5/31/2023  Progress Note Due: 5/17/2023  Visit # / Visits authorized: 2/ 20 (3 total)   FOTO: Next survey due week of 5/1/2023     Precautions: Standard     PTA Visit #: 0/5     Time In: 1400  Time Out: 1500  Total Billable Time: 53 minutes    SUBJECTIVE     Pt reports: "It felt good"  She was not compliant with home exercise program because one has not been issued prior.  Response to previous treatment: "better"  Functional change: N/A at this time    Pain: 0/10  Location: right shoulder      OBJECTIVE     Objective Measures updated at progress report unless specified.     Treatment     Jonathan received the treatments listed below:      therapeutic exercises to develop strength, endurance, ROM, flexibility, and posture for 43 minutes including:  UBE L1.5 x 6 min (3 min forward/3' back)  Pulley AAROM     Flexion x2'   Abduction x2'   ER x1'   Extension x1'   Internal rotation x1'  Seated upper trapezius stretch 3x30s ea   Levator scapulae stretch 3x30s ea   Middle trapezius/Rhomboid stretch 3x30s  Standing doorway pectoral stretch 3x30s  2# Therabar AAROM x10 ea    R flexion    Abduction    External rotation    Extension    Internal rotation   Wall wipes x10 ea    Flexion    Abduction    Arc    Yellow Theraband resisted postural exercise 2x10    Rows w/scapular " retractions    B extension w/scapular retractions    B external rotation w/scapular retractions    IR    Adduction    To be added:   Add resistance to wall wipes, progress to red theraband, add rhythmic stabilization using BOING    manual therapy techniques: Soft tissue mobilizations, Joint mobilizations, Passive range of motion, and Scapular scouring were applied to the: R shoulder for 10 minutes, including:  Grade II superior to inferior R GH glides and anterior to inferior glides  PROM to R shoulder in supine  Scapular scouring in L sidelying  STM: strumming to posterior shoulder & bicep      Patient Education and Home Exercises     Home Exercises Provided and Patient Education Provided     Education provided:   - The patient was instructed in additional therapeutic exercises as noted above in bold print.    Written Home Exercises Provided:  Yes . Exercises were reviewed and Jonathan was able to demonstrate them prior to the end of the session.  Jonathan demonstrated fair  understanding of the education provided. See EMR under Patient Instructions for exercises provided during therapy sessions    ASSESSMENT     The patient reported no pain upon entering clinic. Requires close supervision and tactile cues to insure correct exercise technique as she tends to demonstrate compensatory strategies when exercise is difficult.  ROM improving.  Ready to progress strengthening and postural correction.    Jonathan Is progressing well towards her goals.   Pt prognosis is Fair.     Pt will continue to benefit from skilled outpatient physical therapy to address the deficits listed in the problem list box on initial evaluation, provide pt/family education and to maximize pt's level of independence in the home and community environment.     Pt's spiritual, cultural and educational needs considered and pt agreeable to plan of care and goals.     Anticipated barriers to physical therapy: Postural changes may interfere with  potential for progress.     Goals:  Short Term Goals: 3 weeks   1) Facilitate improved joint play Progressing  2) Facilitate improved upper quadrant flexibility Progressing   3) Facilitate improved posture Ongoing  4) Patient will perform hair care activities > 5 min without increased pain/discomfort 5 of 10 trials Minimal progress     Long Term Goals: 6 weeks   1) Patient will consistently perform hair care activities without increased pain/discomfort Ongoing  2) Patient will consistently reach to at least 2nd shelf in cabinet using her R UE without difficulty Progressing  3) Patient will consistently reach behind her (as in reaching into backseat of car) without increased pain/difficulty Minimal progress  4) Improve functional deficit to < 30% as indicated by FOTO shoulder survey Ongoing  5) Patient will be independent in complete HEP for shoulder flexibility, ROM, and strength. Initiated    PLAN     POC: Outpatient Physical Therapy 2 times weekly for 6 weeks to include the following interventions: Electrical Stimulation IFC/TENS, Manual Therapy, Moist Heat/ Ice, Patient Education, Therapeutic Activities, Therapeutic Exercise, and Functional Dry Needling .     Add resistance to wall wipes, progress to red theraband, add rhythmic stabilization using BOING    Maddi Davalos, PT

## 2023-04-28 ENCOUNTER — LAB VISIT (OUTPATIENT)
Dept: LAB | Facility: HOSPITAL | Age: 73
End: 2023-04-28
Attending: PHYSICIAN ASSISTANT
Payer: MEDICARE

## 2023-04-28 DIAGNOSIS — M85.80 OSTEOPENIA AFTER MENOPAUSE: ICD-10-CM

## 2023-04-28 DIAGNOSIS — E53.8 VITAMIN B12 DEFICIENCY: ICD-10-CM

## 2023-04-28 DIAGNOSIS — E89.0 POST-SURGICAL HYPOTHYROIDISM: ICD-10-CM

## 2023-04-28 DIAGNOSIS — E55.9 HYPOVITAMINOSIS D: ICD-10-CM

## 2023-04-28 DIAGNOSIS — Z78.0 OSTEOPENIA AFTER MENOPAUSE: ICD-10-CM

## 2023-04-28 LAB
25(OH)D3+25(OH)D2 SERPL-MCNC: 31 NG/ML (ref 30–96)
ALBUMIN SERPL BCP-MCNC: 3.6 G/DL (ref 3.5–5.2)
ALP SERPL-CCNC: 36 U/L (ref 55–135)
ALT SERPL W/O P-5'-P-CCNC: 19 U/L (ref 10–44)
ANION GAP SERPL CALC-SCNC: 12 MMOL/L (ref 8–16)
AST SERPL-CCNC: 22 U/L (ref 10–40)
BILIRUB SERPL-MCNC: 0.4 MG/DL (ref 0.1–1)
BUN SERPL-MCNC: 11 MG/DL (ref 8–23)
CALCIUM SERPL-MCNC: 9.4 MG/DL (ref 8.7–10.5)
CHLORIDE SERPL-SCNC: 105 MMOL/L (ref 95–110)
CO2 SERPL-SCNC: 26 MMOL/L (ref 23–29)
CREAT SERPL-MCNC: 0.8 MG/DL (ref 0.5–1.4)
EST. GFR  (NO RACE VARIABLE): >60 ML/MIN/1.73 M^2
GLUCOSE SERPL-MCNC: 76 MG/DL (ref 70–110)
POTASSIUM SERPL-SCNC: 4.1 MMOL/L (ref 3.5–5.1)
PROT SERPL-MCNC: 7.1 G/DL (ref 6–8.4)
SODIUM SERPL-SCNC: 143 MMOL/L (ref 136–145)
T4 FREE SERPL-MCNC: 1.29 NG/DL (ref 0.71–1.51)
TSH SERPL DL<=0.005 MIU/L-ACNC: 0.1 UIU/ML (ref 0.4–4)
VIT B12 SERPL-MCNC: 455 PG/ML (ref 210–950)

## 2023-04-28 PROCEDURE — 80053 COMPREHEN METABOLIC PANEL: CPT | Performed by: PHYSICIAN ASSISTANT

## 2023-04-28 PROCEDURE — 84443 ASSAY THYROID STIM HORMONE: CPT | Performed by: PHYSICIAN ASSISTANT

## 2023-04-28 PROCEDURE — 82306 VITAMIN D 25 HYDROXY: CPT | Performed by: PHYSICIAN ASSISTANT

## 2023-04-28 PROCEDURE — 84439 ASSAY OF FREE THYROXINE: CPT | Performed by: PHYSICIAN ASSISTANT

## 2023-04-28 PROCEDURE — 82607 VITAMIN B-12: CPT | Performed by: PHYSICIAN ASSISTANT

## 2023-04-28 PROCEDURE — 36415 COLL VENOUS BLD VENIPUNCTURE: CPT | Mod: PO | Performed by: PHYSICIAN ASSISTANT

## 2023-05-02 ENCOUNTER — CLINICAL SUPPORT (OUTPATIENT)
Dept: REHABILITATION | Facility: HOSPITAL | Age: 73
End: 2023-05-02
Payer: MEDICARE

## 2023-05-02 DIAGNOSIS — M19.211 SECONDARY OSTEOARTHRITIS OF RIGHT SHOULDER DUE TO ROTATOR CUFF ARTHROPATHY: ICD-10-CM

## 2023-05-02 DIAGNOSIS — M25.511 CHRONIC RIGHT SHOULDER PAIN: ICD-10-CM

## 2023-05-02 DIAGNOSIS — G89.29 CHRONIC RIGHT SHOULDER PAIN: ICD-10-CM

## 2023-05-02 DIAGNOSIS — R68.89 ACTIVITY INTOLERANCE: ICD-10-CM

## 2023-05-02 DIAGNOSIS — Z78.9 SELF-CARE DEFICIT: Primary | ICD-10-CM

## 2023-05-02 PROCEDURE — 97140 MANUAL THERAPY 1/> REGIONS: CPT | Mod: PN,CQ

## 2023-05-02 PROCEDURE — 97110 THERAPEUTIC EXERCISES: CPT | Mod: PN,CQ

## 2023-05-02 NOTE — PROGRESS NOTES
[facility]   Physical Therapy Treatment Note     Name: Jonathan Hinton  Clinic Number: [id number]    Therapy Diagnosis:   Encounter Diagnoses   Name Primary?    Self-care deficit Yes    Activity intolerance     Chronic right shoulder pain     Secondary osteoarthritis of right shoulder due to rotator cuff arthropathy      Physician: Rodríguez Wilson,*    Visit Date: 5/2/2023    Physician Orders: PT Eval and Treat   Medical Diagnosis from Referral: Secondary osteoarthritis of right shoulder due to rotator cuff arthropathy   Evaluation Date: 4/17/2023  Authorization Period Expiration: 05/17/2023   Plan of Care Expiration: 5/31/2023  Progress Note Due: 5/17/2023  Visit # / Visits authorized: 3/ 20 (4 total)   FOTO: Next survey due week of 5/1/2023     Precautions: Standard     PTA Visit #: 1/5     Time In: 0902  Time Out: 0957  Total Billable Time: 53 minutes    SUBJECTIVE     Pt reports: "Feelin' good."  She was compliant with home exercise program.  Response to previous treatment: "felt good  Functional change: N/A at this time    Pain: 0/10  Location: right shoulder      OBJECTIVE     Objective Measures updated at progress report unless specified.     Treatment     Jonathan received the treatments listed below:      therapeutic exercises to develop strength, endurance, ROM, flexibility, and posture for 40 minutes including:  UBE L2 x 8 min (4' forward/4' back)  Seated upper trapezius stretch 3x30s ea   Levator scapulae stretch 3x30s ea   Middle trapezius/Rhomboid stretch 3x30s  Standing doorway pectoral stretch 3x30s  2# Therabar AAROM 2x10 ea    R flexion    Abduction    External rotation    Extension    Internal rotation     press   Wall wipes w/2# 2x10 ea    Flexion    Abduction    Arc x10   Red Theraband resisted postural exercise 2x10    Rows w/scapular retractions    B extension w/scapular retractions    B external rotation w/scapular " retractions    IR    Adduction    To be added:   Rhythmic stabilization using BOING'   Overhead lifting    Did not perform:  Pulley AAROM     Flexion x2'   Abduction x2'   ER x1'   Extension x1'   Internal rotation x1'    manual therapy techniques: Soft tissue mobilizations, Joint mobilizations, Passive range of motion, and Scapular scouring were applied to the: R shoulder for 13 minutes, including:  Grade II superior to inferior R GH glides and anterior to inferior glides  PROM to R shoulder in supine  Scapular scouring in L sidelying  STM: strumming to posterior shoulder & bicep      Patient Education and Home Exercises     Home Exercises Provided and Patient Education Provided     Education provided:   - The patient was instructed in additional therapeutic exercise as stated above, and education was provided about rehabilitation progression.    Written Home Exercises Provided:  Yes . Exercises were reviewed and Jonathan was able to demonstrate them prior to the end of the session.  Jonathan demonstrated fair  understanding of the education provided. See EMR under Patient Instructions for exercises provided during therapy sessions    ASSESSMENT     The patient demonstrates functional range of motion, and does not report pain, only slight difficulty reported with reaching into back seat. Jonathan Hinton was progressed with increased exercise complexity and additional therapeutic exercise with main focus on strengthening functionally for assistance with activities of daily living. Mild crepitus noted with scapular scouring.    Jonathan Is progressing well towards her goals.   Pt prognosis is Fair.     Pt will continue to benefit from skilled outpatient physical therapy to address the deficits listed in the problem list box on initial evaluation, provide pt/family education and to maximize pt's level of independence in the home and community environment.     Pt's spiritual, cultural and educational needs considered and  pt agreeable to plan of care and goals.     Anticipated barriers to physical therapy: Postural changes may interfere with potential for progress.     Goals:  Short Term Goals: 3 weeks   1) Facilitate improved joint play Progressing  2) Facilitate improved upper quadrant flexibility Progressing   3) Facilitate improved posture Slow progress  4) Patient will perform hair care activities > 5 min without increased pain/discomfort 5 of 10 trials Minimal progress     Long Term Goals: 6 weeks   1) Patient will consistently perform hair care activities without increased pain/discomfort Progressing/nearly met  2) Patient will consistently reach to at least 2nd shelf in cabinet using her R UE without difficulty Progressing/nearly met  3) Patient will consistently reach behind her (as in reaching into backseat of car) without increased pain/difficulty Gradually progressing  4) Improve functional deficit to < 30% as indicated by FOTO shoulder survey Ongoing  5) Patient will be independent in complete HEP for shoulder flexibility, ROM, and strength. Progressing    PLAN     POC: Outpatient Physical Therapy 2 times weekly for 6 weeks to include the following interventions: Electrical Stimulation IFC/TENS, Manual Therapy, Moist Heat/ Ice, Patient Education, Therapeutic Activities, Therapeutic Exercise, and Functional Dry Needling .     The patient is to be progressed within the established plan of care as tolerated in order to accomplish goals as stated above. Plan to instruct in overhead lifting and body mechanics in subsequent session, as well as BOING' rhythmic stabilization.    Daina Del Cid, PTA

## 2023-05-04 ENCOUNTER — CLINICAL SUPPORT (OUTPATIENT)
Dept: REHABILITATION | Facility: HOSPITAL | Age: 73
End: 2023-05-04
Payer: MEDICARE

## 2023-05-04 DIAGNOSIS — G89.29 CHRONIC RIGHT SHOULDER PAIN: ICD-10-CM

## 2023-05-04 DIAGNOSIS — Z78.9 SELF-CARE DEFICIT: Primary | ICD-10-CM

## 2023-05-04 DIAGNOSIS — R68.89 ACTIVITY INTOLERANCE: ICD-10-CM

## 2023-05-04 DIAGNOSIS — M25.511 CHRONIC RIGHT SHOULDER PAIN: ICD-10-CM

## 2023-05-04 DIAGNOSIS — M19.211 SECONDARY OSTEOARTHRITIS OF RIGHT SHOULDER DUE TO ROTATOR CUFF ARTHROPATHY: ICD-10-CM

## 2023-05-04 PROCEDURE — 97110 THERAPEUTIC EXERCISES: CPT | Mod: PN,CQ

## 2023-05-04 PROCEDURE — 97140 MANUAL THERAPY 1/> REGIONS: CPT | Mod: PN,CQ

## 2023-05-04 NOTE — PROGRESS NOTES
"[facility]   Physical Therapy Treatment Note     Name: Jonathan Hinton  Clinic Number: [id number]    Therapy Diagnosis:   Encounter Diagnoses   Name Primary?    Self-care deficit Yes    Activity intolerance     Chronic right shoulder pain     Secondary osteoarthritis of right shoulder due to rotator cuff arthropathy      Physician: Rodríguez Wilson,*    Visit Date: 5/4/2023    Physician Orders: PT Eval and Treat   Medical Diagnosis from Referral: Secondary osteoarthritis of right shoulder due to rotator cuff arthropathy   Evaluation Date: 4/17/2023  Authorization Period Expiration: 05/17/2023   Plan of Care Expiration: 5/31/2023  Progress Note Due: 5/17/2023  Visit # / Visits authorized: 4/ 20 (5 total)   FOTO: Completed 5/4/2023 52% Residual Deficit  Next survey due week of 5/22/2023     Precautions: Standard     PTA Visit #: 2/5     Time In: 0905 (FOTO survey performed during)  Time Out: 1010  Total Billable Time: 58 minutes    SUBJECTIVE     Pt reports: "Doing good."  She was compliant with home exercise program.  Response to previous treatment: "some soreness"  Functional change: N/A at this time    Pain: 0/10  Location: right shoulder      OBJECTIVE     Objective Measures updated at progress report unless specified.     Treatment     Jonathan received the treatments listed below:      therapeutic exercises to develop strength, endurance, ROM, flexibility, and posture for 48 minutes including:  UBE L3 x 8 min (4' forward/4' back)  Seated upper trapezius stretch 3x30s ea   Levator scapulae stretch 3x30s ea   Middle trapezius/Rhomboid stretch 3x30s  Standing doorway pectoral stretch 3x30s  3# Therabar AAROM x20 ea    R flexion    Abduction    External rotation    Extension    Internal rotation     press   Wall wipes w/2# x20 ea    Flexion    Abduction    Arc x15   Red Theraband resisted postural exercise x20    Rows w/scapular retractions (increase resistance)    B extension " w/scapular retractions    B external rotation w/scapular retractions    IR    Adduction    Horizontal abduction   BOING' rhythmic stabilization 2x30s ea (R UE)    Flexion    Abduction   Overhead lifting x10 ea    B UE w/2#    R UE w/1#    To be added:   Review body mechanics for ADLs   Banded wall walks    Did not perform:  Pulley AAROM     Flexion x2'   Abduction x2'   ER x1'   Extension x1'   Internal rotation x1'    manual therapy techniques: Soft tissue mobilizations, Joint mobilizations, Passive range of motion, and Scapular scouring were applied to the: R shoulder for 10 minutes, including:  Grade II superior to inferior R GH glides and anterior to posterior glides  PROM to R shoulder in supine  Scapular scouring in L sidelying  STM: strumming to posterior shoulder & bicep      Patient Education and Home Exercises     Home Exercises Provided and Patient Education Provided     Education provided:   - Body mechanics for overhead lifting was reviewed with the patient. Patient was instructed in increased exercise complexity as stated above in bold print, as well as additional therapeutic exercise.    Written Home Exercises Provided: Patient instructed to cont prior HEP. Exercises were reviewed and Jonathan was able to demonstrate them prior to the end of the session.  Jonathan demonstrated fair  understanding of the education provided. See EMR under Patient Instructions for exercises provided during therapy sessions    ASSESSMENT     The patient reported some soreness present post previous session, but no pain or soreness today. Jonathan Hinton was progressed with increased exercise complexity as stated above, focusing on increased functional strength, flexibility, posture, and body mechanics to assist with activities of daily living. Patient requires visual and tactile cues to perform exercises with correct form; patient tends get confused with cervical region stretches as well. Crepitus noted in glenohumeral joint  during joint mobilizations and scapular scouring.    Jonathan Is progressing well towards her goals.   Pt prognosis is Fair.     Pt will continue to benefit from skilled outpatient physical therapy to address the deficits listed in the problem list box on initial evaluation, provide pt/family education and to maximize pt's level of independence in the home and community environment.     Pt's spiritual, cultural and educational needs considered and pt agreeable to plan of care and goals.     Anticipated barriers to physical therapy: Postural changes may interfere with potential for progress.     Goals:  Short Term Goals: 3 weeks   1) Facilitate improved joint play Progressing  2) Facilitate improved upper quadrant flexibility Progressing   3) Facilitate improved posture Slow progress  4) Patient will perform hair care activities > 5 min without increased pain/discomfort 5 of 10 trials Progressing     Long Term Goals: 6 weeks   1) Patient will consistently perform hair care activities without increased pain/discomfort Progressing/nearly met  2) Patient will consistently reach to at least 2nd shelf in cabinet using her R UE without difficulty Progressing/nearly met  3) Patient will consistently reach behind her (as in reaching into backseat of car) without increased pain/difficulty Gradually progressing  4) Improve functional deficit to < 30% as indicated by FOTO shoulder survey Regressed on this date  5) Patient will be independent in complete HEP for shoulder flexibility, ROM, and strength. Progressing    PLAN     POC: Outpatient Physical Therapy 2 times weekly for 6 weeks to include the following interventions: Electrical Stimulation IFC/TENS, Manual Therapy, Moist Heat/ Ice, Patient Education, Therapeutic Activities, Therapeutic Exercise, and Functional Dry Needling .     The patient is to be progressed within the established plan of care as tolerated in order to accomplish goals as stated above. Plan to instruct  in body mechanics for activities of daily living in subsequent session(s).    Daina Del Cid, PTA

## 2023-05-05 ENCOUNTER — OFFICE VISIT (OUTPATIENT)
Dept: ENDOCRINOLOGY | Facility: CLINIC | Age: 73
End: 2023-05-05
Payer: MEDICARE

## 2023-05-05 VITALS
HEART RATE: 53 BPM | OXYGEN SATURATION: 98 % | DIASTOLIC BLOOD PRESSURE: 70 MMHG | HEIGHT: 61 IN | WEIGHT: 115.44 LBS | TEMPERATURE: 98 F | SYSTOLIC BLOOD PRESSURE: 130 MMHG | BODY MASS INDEX: 21.79 KG/M2

## 2023-05-05 DIAGNOSIS — E89.0 POST-SURGICAL HYPOTHYROIDISM: Primary | ICD-10-CM

## 2023-05-05 DIAGNOSIS — E53.8 VITAMIN B 12 DEFICIENCY: ICD-10-CM

## 2023-05-05 DIAGNOSIS — M81.0 OSTEOPOROSIS, UNSPECIFIED OSTEOPOROSIS TYPE, UNSPECIFIED PATHOLOGICAL FRACTURE PRESENCE: ICD-10-CM

## 2023-05-05 DIAGNOSIS — I10 HYPERTENSION, UNSPECIFIED TYPE: ICD-10-CM

## 2023-05-05 DIAGNOSIS — E78.5 HYPERLIPIDEMIA, UNSPECIFIED HYPERLIPIDEMIA TYPE: ICD-10-CM

## 2023-05-05 DIAGNOSIS — E55.9 HYPOVITAMINOSIS D: ICD-10-CM

## 2023-05-05 PROCEDURE — 1126F PR PAIN SEVERITY QUANTIFIED, NO PAIN PRESENT: ICD-10-PCS | Mod: CPTII,S$GLB,, | Performed by: PHYSICIAN ASSISTANT

## 2023-05-05 PROCEDURE — 1101F PR PT FALLS ASSESS DOC 0-1 FALLS W/OUT INJ PAST YR: ICD-10-PCS | Mod: CPTII,S$GLB,, | Performed by: PHYSICIAN ASSISTANT

## 2023-05-05 PROCEDURE — 4010F ACE/ARB THERAPY RXD/TAKEN: CPT | Mod: CPTII,S$GLB,, | Performed by: PHYSICIAN ASSISTANT

## 2023-05-05 PROCEDURE — 99999 PR PBB SHADOW E&M-EST. PATIENT-LVL IV: ICD-10-PCS | Mod: PBBFAC,,, | Performed by: PHYSICIAN ASSISTANT

## 2023-05-05 PROCEDURE — 1126F AMNT PAIN NOTED NONE PRSNT: CPT | Mod: CPTII,S$GLB,, | Performed by: PHYSICIAN ASSISTANT

## 2023-05-05 PROCEDURE — 1159F PR MEDICATION LIST DOCUMENTED IN MEDICAL RECORD: ICD-10-PCS | Mod: CPTII,S$GLB,, | Performed by: PHYSICIAN ASSISTANT

## 2023-05-05 PROCEDURE — 3078F PR MOST RECENT DIASTOLIC BLOOD PRESSURE < 80 MM HG: ICD-10-PCS | Mod: CPTII,S$GLB,, | Performed by: PHYSICIAN ASSISTANT

## 2023-05-05 PROCEDURE — 3288F PR FALLS RISK ASSESSMENT DOCUMENTED: ICD-10-PCS | Mod: CPTII,S$GLB,, | Performed by: PHYSICIAN ASSISTANT

## 2023-05-05 PROCEDURE — 3075F PR MOST RECENT SYSTOLIC BLOOD PRESS GE 130-139MM HG: ICD-10-PCS | Mod: CPTII,S$GLB,, | Performed by: PHYSICIAN ASSISTANT

## 2023-05-05 PROCEDURE — 1159F MED LIST DOCD IN RCRD: CPT | Mod: CPTII,S$GLB,, | Performed by: PHYSICIAN ASSISTANT

## 2023-05-05 PROCEDURE — 99213 PR OFFICE/OUTPT VISIT, EST, LEVL III, 20-29 MIN: ICD-10-PCS | Mod: S$GLB,,, | Performed by: PHYSICIAN ASSISTANT

## 2023-05-05 PROCEDURE — 3008F PR BODY MASS INDEX (BMI) DOCUMENTED: ICD-10-PCS | Mod: CPTII,S$GLB,, | Performed by: PHYSICIAN ASSISTANT

## 2023-05-05 PROCEDURE — 3288F FALL RISK ASSESSMENT DOCD: CPT | Mod: CPTII,S$GLB,, | Performed by: PHYSICIAN ASSISTANT

## 2023-05-05 PROCEDURE — 4010F PR ACE/ARB THEARPY RXD/TAKEN: ICD-10-PCS | Mod: CPTII,S$GLB,, | Performed by: PHYSICIAN ASSISTANT

## 2023-05-05 PROCEDURE — 99999 PR PBB SHADOW E&M-EST. PATIENT-LVL IV: CPT | Mod: PBBFAC,,, | Performed by: PHYSICIAN ASSISTANT

## 2023-05-05 PROCEDURE — 99213 OFFICE O/P EST LOW 20 MIN: CPT | Mod: S$GLB,,, | Performed by: PHYSICIAN ASSISTANT

## 2023-05-05 PROCEDURE — 3075F SYST BP GE 130 - 139MM HG: CPT | Mod: CPTII,S$GLB,, | Performed by: PHYSICIAN ASSISTANT

## 2023-05-05 PROCEDURE — 1160F RVW MEDS BY RX/DR IN RCRD: CPT | Mod: CPTII,S$GLB,, | Performed by: PHYSICIAN ASSISTANT

## 2023-05-05 PROCEDURE — 3078F DIAST BP <80 MM HG: CPT | Mod: CPTII,S$GLB,, | Performed by: PHYSICIAN ASSISTANT

## 2023-05-05 PROCEDURE — 3008F BODY MASS INDEX DOCD: CPT | Mod: CPTII,S$GLB,, | Performed by: PHYSICIAN ASSISTANT

## 2023-05-05 PROCEDURE — 1160F PR REVIEW ALL MEDS BY PRESCRIBER/CLIN PHARMACIST DOCUMENTED: ICD-10-PCS | Mod: CPTII,S$GLB,, | Performed by: PHYSICIAN ASSISTANT

## 2023-05-05 PROCEDURE — 1101F PT FALLS ASSESS-DOCD LE1/YR: CPT | Mod: CPTII,S$GLB,, | Performed by: PHYSICIAN ASSISTANT

## 2023-05-05 RX ORDER — LEVOTHYROXINE SODIUM 50 UG/1
50 TABLET ORAL
Qty: 90 TABLET | Refills: 3 | Status: SHIPPED | OUTPATIENT
Start: 2023-05-05 | End: 2024-03-08

## 2023-05-05 NOTE — PROGRESS NOTES
CC: Hyperthyroidism    HPI: Jonathan Hinton is a 73 y.o. female here for hyperthyroidism that was diagnosed several years ago along with pending conditions listed in the Visit Diagnosis. +FHx of thyroid disease in one sister and two nieces. Her dad, brother and three uncles have DM. No hx of neck radiation. Hx of Graves' Disease. She had multiple thyroid nodules. She had a thyroidectomy w/ Dr. Cannon in 7/22. Path was benign.     PMHx, PSHx: reviewed in epic.    Social Hx: no ETOH/tobacco use. She smoked > ten years ~1 pack per day. She quit in 5/18 on her own. She worked at [employer].    Taking Levothyroxine 75 mcg daily. She met with Dr. Marie in 1/20 and decided to forego thyroid surgery.   + sweating, hair loss, anxiety, wt loss  No palpitations, hair loss, wt loss (intentional), diarrhea/constipation.     Eye exam: 10/21- cataracts  + tearing and grittiness in eyes.     Last DEXA 1/23 shows osteoporosis. Taking fosamax since 1/23. No falls this year. No steroid injections. She is taking vitamin d and calcium. She runs 3x times weekly. She walks for 45 min. Taking ca and vd. She is doing stretching exercises.     No SOB,  Dysphagia. States her voice is deeper.     Wt Readings from Last 6 Encounters:   05/05/23 52.3 kg (115 lb 6.6 oz)   03/16/23 56.8 kg (125 lb 3.5 oz)   02/14/23 56.8 kg (125 lb 3.5 oz)   11/22/22 56.9 kg (125 lb 8.8 oz)   08/03/22 61.2 kg (134 lb 14.7 oz)   07/14/22 61.2 kg (134 lb 14.7 oz)      ROS:   Constitutional: energy stable, wt loss (10 lbs since 3/23).   Eyes: + blurry vision, denies double vision.   Cardiovascular: Denies current anginal symptoms  Respiratory: Denies current respiratory difficulty  Gastrointestinal: Denies recent bowel disturbances  GenitoUrinary - No dysuria  Skin: No new skin rash  Musc: legs and arms pain, knee pain  Neurologic:+numbness and tingling in hands, + headaches.    Endocrine: no polyphagia, polydipsia, polyuria  Remainder ROS negative     Personally  reviewed imaging and labs below:    Lab Results   Component Value Date    TSH 0.097 (L) 04/28/2023    Y4VHSAA 93 05/01/2020    FREET4 1.29 04/28/2023        Chemistry        Component Value Date/Time     04/28/2023 1005    K 4.1 04/28/2023 1005     04/28/2023 1005    CO2 26 04/28/2023 1005    BUN 11 04/28/2023 1005    CREATININE 0.8 04/28/2023 1005    GLU 76 04/28/2023 1005        Component Value Date/Time    CALCIUM 9.4 04/28/2023 1005    ALKPHOS 36 (L) 04/28/2023 1005    AST 22 04/28/2023 1005    ALT 19 04/28/2023 1005    BILITOT 0.4 04/28/2023 1005    ESTGFRAFRICA >60 07/14/2022 1805    EGFRNONAA >60 07/14/2022 1805         Lab Results   Component Value Date    HGBA1C 5.3 05/10/2018      DEXA BONE DENSITY SPINE HIP     CLINICAL HISTORY:  Other specified disorders of bone density and structure, unspecified site     TECHNIQUE:  DXA scanning was performed over the left hip and lumbar spine.  Review of the images confirms satisfactory positioning and technique.     COMPARISON:  None     FINDINGS:  The L1 to L4 vertebral bone mineral density is equal to 1.045 g/cm squared with a T score of -1.0.  There has been 5.8% increase relative to the prior study.     The left femoral neck bone mineral density is equal to 0.572 g/cm squared with a T score of -2.7.  There has been  20.3% decrease relative to the prior study.     There is a 9.8% risk of a major osteoporotic fracture and a 4.0% risk of hip fracture in the next 10 years (FRAX).     Impression:     Osteoporosis with A T-score of -2.7       PE:  GENERAL: Elderly female, Well hydrated. NAD.  NECK: Supple neck, normal thyroid. No bruit  LYMPHATIC: No cervical or supraclavicular lymphadenopathy  CARDIOVASCULAR: Normal heart sounds, no pedal edema  RESPIRATORY: Normal effort, CTAB.  Psych: appropriate mood and affect  NEURO:CN ll-Xll intact, steady gait    Assessment/Plan:   1. Post-surgical hypothyroidism  T4, Free    TSH    levothyroxine (SYNTHROID) 50 MCG  tablet    T4, Free    TSH    Comprehensive Metabolic Panel    CBC Auto Differential    Lipid Panel      2. Osteoporosis, unspecified osteoporosis type, unspecified pathological fracture presence        3. Hyperlipidemia, unspecified hyperlipidemia type        4. Hypertension, unspecified type        5. Hypovitaminosis D  Vitamin D      6. Vitamin B 12 deficiency  Vitamin B12        Post-surgical hypothyroidism-TSH is supressed. Decrease LT4 to 50 mcg qd. Recheck in six weeks.  Osteoporosis-continue ca and vitamin D. Continue weight bearing exercises. Repeat DEXA scan 1/25.  Srjcjfczqcvjcx-ugbiej-dnztpyxd zocor  Hypertension-stable-Continue meds.  Hypovitaminosis S-xrlpvc-bdnyaris vd.  Vitamin b 12 deficiency-stable-monitor.    TSH/T4 in six weeks  F/u in 6 months w/ labs prior

## 2023-05-09 ENCOUNTER — CLINICAL SUPPORT (OUTPATIENT)
Dept: REHABILITATION | Facility: HOSPITAL | Age: 73
End: 2023-05-09
Payer: MEDICARE

## 2023-05-09 DIAGNOSIS — G89.29 CHRONIC RIGHT SHOULDER PAIN: ICD-10-CM

## 2023-05-09 DIAGNOSIS — R68.89 ACTIVITY INTOLERANCE: ICD-10-CM

## 2023-05-09 DIAGNOSIS — M25.511 CHRONIC RIGHT SHOULDER PAIN: ICD-10-CM

## 2023-05-09 DIAGNOSIS — M19.211 SECONDARY OSTEOARTHRITIS OF RIGHT SHOULDER DUE TO ROTATOR CUFF ARTHROPATHY: ICD-10-CM

## 2023-05-09 DIAGNOSIS — Z78.9 SELF-CARE DEFICIT: Primary | ICD-10-CM

## 2023-05-09 PROCEDURE — 97140 MANUAL THERAPY 1/> REGIONS: CPT | Mod: PN,CQ

## 2023-05-09 PROCEDURE — 97110 THERAPEUTIC EXERCISES: CPT | Mod: PN,CQ

## 2023-05-09 NOTE — PROGRESS NOTES
"[facility]   Physical Therapy Treatment Note    Name: Jonathan Hinton  Clinic Number: [id number]    Therapy Diagnosis:   Encounter Diagnoses   Name Primary?    Self-care deficit Yes    Activity intolerance     Chronic right shoulder pain     Secondary osteoarthritis of right shoulder due to rotator cuff arthropathy      Physician: Rodríguez Wilson,*    Visit Date: 5/9/2023    Physician Orders: PT Eval and Treat   Medical Diagnosis from Referral: Secondary osteoarthritis of right shoulder due to rotator cuff arthropathy   Evaluation Date: 4/17/2023  Authorization Period Expiration: 05/17/2023   Plan of Care Expiration: 5/31/2023  Progress Note Due: 5/17/2023  Visit # / Visits authorized: 5/ 20 (6 total)   FOTO: Completed 5/4/2023 52% Residual Deficit  Next survey due week of 5/22/2023     Precautions: Standard     PTA Visit #: 3/5     Time In: 0806  Time Out: 0905  Total Billable Time: 58 minutes    SUBJECTIVE     Pt reports: "I was doing some other stuff this weekend. I was weed-eating my yard, so I was sore. I didn't have any problems, just sore."  She was compliant with home exercise program.  Response to previous treatment: "was good"  Functional change: N/A at this time    Pain: 0/10  Location: right shoulder      OBJECTIVE     Objective Measures updated at progress report unless specified.     Treatment     Jonathan received the treatments listed below:      therapeutic exercises to develop strength, endurance, ROM, flexibility, and posture for 50 minutes including:  UBE L3 x 8 min (4' forward/4' back)  Seated upper trapezius stretch 3x30s ea   Levator scapulae stretch 3x30s ea   Middle trapezius/Rhomboid stretch 3x30s  Standing doorway pectoral stretch 3x30s  3# Therabar AAROM x20 ea    R flexion    Abduction    External rotation    Extension    Internal rotation     press   Wall wipes w/2# x20 ea    Flexion    Abduction    Arc x15   Green Theraband resisted postural " exercise x20    Rows w/scapular retractions    B extension w/scapular retractions    B external rotation w/scapular retractions    IR    Adduction    Horizontal abduction   Yellow Theraband wall walks x3   BOING' rhythmic stabilization 2x30s ea (R UE)    Flexion    Abduction   Overhead lifting     B UE w/3# x10    R UE w/2# x4    To be added:   Review body mechanics for ADLs    Did not perform:  Pulley AAROM     Flexion x2'   Abduction x2'   ER x1'   Extension x1'   Internal rotation x1'    manual therapy techniques: Soft tissue mobilizations, Joint mobilizations, Passive range of motion, and Scapular scouring were applied to the: R shoulder for 8 minutes, including:  Grade II superior to inferior R GH glides and anterior to posterior glides  Scapular scouring in L sidelying  STM: strumming to posterior shoulder & bicep      Patient Education and Home Exercises     Home Exercises Provided and Patient Education Provided     Education provided:   - The patient was instructed in increased exercise complexity as stated above in bold print. Education was provided on scapular elevation.    Written Home Exercises Provided: Patient instructed to cont prior HEP. Exercises were reviewed and Flacakris was able to demonstrate them prior to the end of the session.  Natalyjaskarankris demonstrated fair  understanding of the education provided. See EMR under Patient Instructions for exercises provided during therapy sessions    ASSESSMENT     Jonathan Hinton was progressed with increased exercise complexity as stated above. The patient demonstrates difficulty with resisted shoulder flexion, hiking/elevating shoulder. During scapular scouring the patients scapulothoracic movement and rhythm is stiff and choppy at times throughout in each motion (elevation, depression, protraction & retraction during rotations). Minimal to moderate crepitus noted during as well. Passively patient demonstrates full range of motion. The patient requires tactile  cues for proper performance of therapeutic exercises, and would benefit from increased postural/body mechanics training. Overhead lifting has been reviewed with the patient several times. Patient is very active with chores indoors and outdoors. The patient does not report difficulty during, but does however demonstrate compromising posture, which could lead to future injury.    Jonathan Is progressing well towards her goals.   Pt prognosis is Fair.     Pt will continue to benefit from skilled outpatient physical therapy to address the deficits listed in the problem list box on initial evaluation, provide pt/family education and to maximize pt's level of independence in the home and community environment.     Pt's spiritual, cultural and educational needs considered and pt agreeable to plan of care and goals.     Anticipated barriers to physical therapy: Postural changes may interfere with potential for progress.     Goals:  Short Term Goals: 3 weeks   1) Facilitate improved joint play Progressing  2) Facilitate improved upper quadrant flexibility Progressing   3) Facilitate improved posture Slow progress  4) Patient will perform hair care activities > 5 min without increased pain/discomfort 5 of 10 trials Progressing     Long Term Goals: 6 weeks   1) Patient will consistently perform hair care activities without increased pain/discomfort Progressing/nearly met  2) Patient will consistently reach to at least 2nd shelf in cabinet using her R UE without difficulty Progressing/nearly met  3) Patient will consistently reach behind her (as in reaching into backseat of car) without increased pain/difficulty Gradually progressing  4) Improve functional deficit to < 30% as indicated by FOTO shoulder survey Regressed on this date  5) Patient will be independent in complete HEP for shoulder flexibility, ROM, and strength. Progressing    PLAN     POC: Outpatient Physical Therapy 2 times weekly for 6 weeks to include the  following interventions: Electrical Stimulation IFC/TENS, Manual Therapy, Moist Heat/ Ice, Patient Education, Therapeutic Activities, Therapeutic Exercise, and Functional Dry Needling.     The patient is to be progressed within the established plan of care as tolerated in order to accomplish goals as stated above. Plan to instruct in body mechanics for activities of daily living in subsequent session(s). Promote scapulothoracic mobility and true shoulder flexion without shoulder hiking/elevation actively, partially by incorporating increased scapular stability focus in subsequent sessions. Address PNF patterns for increased function, especially with reaching into back seat.    Daina Del Cid, PTA

## 2023-05-16 ENCOUNTER — CLINICAL SUPPORT (OUTPATIENT)
Dept: REHABILITATION | Facility: HOSPITAL | Age: 73
End: 2023-05-16
Payer: MEDICARE

## 2023-05-16 DIAGNOSIS — R68.89 ACTIVITY INTOLERANCE: ICD-10-CM

## 2023-05-16 DIAGNOSIS — G89.29 CHRONIC RIGHT SHOULDER PAIN: ICD-10-CM

## 2023-05-16 DIAGNOSIS — Z78.9 SELF-CARE DEFICIT: ICD-10-CM

## 2023-05-16 DIAGNOSIS — M25.511 CHRONIC RIGHT SHOULDER PAIN: ICD-10-CM

## 2023-05-16 DIAGNOSIS — M19.211 SECONDARY OSTEOARTHRITIS OF RIGHT SHOULDER DUE TO ROTATOR CUFF ARTHROPATHY: Primary | ICD-10-CM

## 2023-05-16 PROCEDURE — 97110 THERAPEUTIC EXERCISES: CPT | Mod: PN

## 2023-05-16 PROCEDURE — 97140 MANUAL THERAPY 1/> REGIONS: CPT | Mod: PN

## 2023-05-16 NOTE — PROGRESS NOTES
"[facility]   Physical Therapy Treatment Note    Name: Jonathan Hinton  Clinic Number: [id number]    Therapy Diagnosis:   Encounter Diagnoses   Name Primary?    Secondary osteoarthritis of right shoulder due to rotator cuff arthropathy Yes    Activity intolerance     Chronic right shoulder pain     Self-care deficit      Physician: Rodríguez Wilson,*    Visit Date: 5/16/2023    Physician Orders: PT Eval and Treat   Medical Diagnosis from Referral: Secondary osteoarthritis of right shoulder due to rotator cuff arthropathy   Evaluation Date: 4/17/2023  Authorization Period Expiration: 05/17/2023   Plan of Care Expiration: 5/31/2023  Progress Note Due: 5/17/2023  Visit # / Visits authorized: 6/ 20 (7 total)   FOTO: Completed 5/4/2023 52% Residual Deficit  Next survey due week of 5/22/2023     Precautions: Standard     PTA Visit #: 0/5     Time In: 0802  Time Out: 0906  Total Billable Time: 60 minutes    SUBJECTIVE     Pt reports: "I don't have any pain today, sometimes it just comes and goes."  She was compliant with home exercise program.  Response to previous treatment: "felt good"  Functional change: N/A at this time    Pain: 0/10  Location: right shoulder      OBJECTIVE     Objective Measures updated at progress report unless specified.     Treatment     Jonathan received the treatments listed below:      therapeutic exercises to develop strength, endurance, ROM, flexibility, and posture for 52 minutes including:  UBE L3 x 8 min (4' forward/4' back)  Seated upper trapezius stretch 3x30s ea   Levator scapulae stretch 3x30s ea   Middle trapezius/Rhomboid stretch 3x30s              Cervical retraction 2 x 10 with tactile cueing for technique  Standing doorway pectoral stretch 3x30s  3# Therabar AAROM x20 ea    R flexion    Abduction    External rotation    Extension    Internal rotation     press   Wall wipes w/2# x20 ea decreased weight to 1#    Flexion    Abduction    Arc " "x15   Green Theraband resisted postural exercise x20    Rows w/scapular retractions    B extension w/scapular retractions    B external rotation w/scapular retractions Decreased to Yellow Theraband    IR    Adduction    Horizontal abduction Decreased to red theraband   Yellow Theraband wall walks x3   BOING' rhythmic stabilization 2x30s ea (R UE)    Flexion Elbow flexed    Abduction Elbow flexed   Overhead lifting DNP    B UE w/3# x10    R UE w/2# x4              Sidelying                          External rotation 3 x 10                         Abd to 90*    To be added:   Review body mechanics for ADLs    Did not perform:  Pulley AAROM     Flexion x2'   Abduction x2'   ER x1'   Extension x1'   Internal rotation x1'    manual therapy techniques: Soft tissue mobilizations, Joint mobilizations, Passive range of motion, and Scapular scouring were applied to the: R shoulder for 8 minutes, including:  Grade II superior to inferior R GH glides and anterior to posterior glides  Scapular scouring in L sidelying  STM: strumming to posterior shoulder & bicep  Rhythmic Stabilization 2 x 30"            Supine shoulder at 90* flex            S/L at 90* flex            Supine elbow flexed and shoulder abd to 45*      Patient Education and Home Exercises     Home Exercises Provided and Patient Education Provided     Education provided:   - The patient was instructed in increased exercise complexity as stated above in bold print. Education was provided on postural correction in standing and during therapeutic exercises.    Written Home Exercises Provided: Patient instructed to cont prior HEP. Exercises were reviewed and Jonathan was able to demonstrate them prior to the end of the session.  Jonathan demonstrated fair  understanding of the education provided. See EMR under Patient Instructions for exercises provided during therapy sessions    ASSESSMENT     Jonathan Hinton presented into clinic with no reported pain today. She " demonstrated increased right shoulder compensation throughout treatment session requiring near constant verbal/tactile/visual cueing for improvement. Above exercises were modified to reflect changes in resistance/technique to address compensation (noted in bold). Limitations in posture are structural as well as habitual.    Jonathan Is progressing fair towards her goals.   Pt prognosis is Fair.     Pt will continue to benefit from skilled outpatient physical therapy to address the deficits listed in the problem list box on initial evaluation, provide pt/family education and to maximize pt's level of independence in the home and community environment.     Pt's spiritual, cultural and educational needs considered and pt agreeable to plan of care and goals.     Anticipated barriers to physical therapy: Postural changes may interfere with potential for progress.     Goals:  Short Term Goals: 3 weeks   1) Facilitate improved joint play Progressing  2) Facilitate improved upper quadrant flexibility Progressing   3) Facilitate improved posture Slow progress  4) Patient will perform hair care activities > 5 min without increased pain/discomfort 5 of 10 trials Progressing     Long Term Goals: 6 weeks   1) Patient will consistently perform hair care activities without increased pain/discomfort Progressing/nearly met  2) Patient will consistently reach to at least 2nd shelf in cabinet using her R UE without difficulty Progressing/nearly met  3) Patient will consistently reach behind her (as in reaching into backseat of car) without increased pain/difficulty Gradually progressing  4) Improve functional deficit to < 30% as indicated by FOTO shoulder survey Regressed on this date  5) Patient will be independent in complete HEP for shoulder flexibility, ROM, and strength. Progressing    PLAN     POC: Outpatient Physical Therapy 2 times weekly for 6 weeks to include the following interventions: Electrical Stimulation IFC/TENS,  Manual Therapy, Moist Heat/ Ice, Patient Education, Therapeutic Activities, Therapeutic Exercise, and Functional Dry Needling.     The patient is to be progressed within the established plan of care as tolerated in order to accomplish goals as stated above. Plan to instruct in body mechanics for activities of daily living in subsequent session(s). Promote scapulothoracic mobility and true shoulder flexion without shoulder hiking/elevation actively, partially by incorporating increased scapular stability focus in subsequent sessions. Address PNF patterns for increased function, especially with reaching into back seat.    Dennys Posey, PT

## 2023-05-18 ENCOUNTER — CLINICAL SUPPORT (OUTPATIENT)
Dept: REHABILITATION | Facility: HOSPITAL | Age: 73
End: 2023-05-18
Payer: MEDICARE

## 2023-05-18 DIAGNOSIS — G89.29 CHRONIC RIGHT SHOULDER PAIN: ICD-10-CM

## 2023-05-18 DIAGNOSIS — M19.211 SECONDARY OSTEOARTHRITIS OF RIGHT SHOULDER DUE TO ROTATOR CUFF ARTHROPATHY: ICD-10-CM

## 2023-05-18 DIAGNOSIS — Z78.9 SELF-CARE DEFICIT: Primary | ICD-10-CM

## 2023-05-18 DIAGNOSIS — R68.89 ACTIVITY INTOLERANCE: ICD-10-CM

## 2023-05-18 DIAGNOSIS — M25.511 CHRONIC RIGHT SHOULDER PAIN: ICD-10-CM

## 2023-05-18 PROCEDURE — 97110 THERAPEUTIC EXERCISES: CPT | Mod: PN,CQ

## 2023-05-18 PROCEDURE — 97140 MANUAL THERAPY 1/> REGIONS: CPT | Mod: PN,CQ

## 2023-05-18 NOTE — PROGRESS NOTES
"[facility]   Physical Therapy Treatment Note    Name: Jonathan Hinton  Clinic Number: [id number]    Therapy Diagnosis:   Encounter Diagnoses   Name Primary?    Self-care deficit Yes    Activity intolerance     Chronic right shoulder pain     Secondary osteoarthritis of right shoulder due to rotator cuff arthropathy      Physician: Rodríguez Wilson,*    Visit Date: 5/18/2023    Physician Orders: PT Eval and Treat   Medical Diagnosis from Referral: Secondary osteoarthritis of right shoulder due to rotator cuff arthropathy   Evaluation Date: 4/17/2023  Authorization Period Expiration: 05/17/2023   Plan of Care Expiration: 5/31/2023  Progress Note Due: 5/17/2023  Visit # / Visits authorized: 7/ 20 (8 total)   FOTO: Completed 5/4/2023 52% Residual Deficit  Next survey due week of 5/22/2023     Precautions: Standard     PTA Visit #: 1/5     Time In: 0806  Time Out: 0906  Total Billable Time: 60 minutes    SUBJECTIVE     Pt reports: "I am doing good."  She was somewhat compliant with home exercise program.  Response to previous treatment: "was good"  Functional change: N/A at this time    Pain: 0/10  Location: right shoulder      OBJECTIVE     Objective Measures updated at progress report unless specified.     Treatment     Jonathan received the treatments listed below:      therapeutic exercises to develop strength, endurance, ROM, flexibility, and posture for 52 minutes including:  UBE L3.5 x 8 min (4' forward/4' back)  Seated upper trapezius stretch 3x30s ea   Levator scapulae stretch 3x30s ea   Middle trapezius/Rhomboid stretch 3x30s              Cervical retraction 2x10 with tactile cueing for technique  Standing doorway pectoral stretch 3x30s  3# Therabar AAROM x20 ea    R flexion (tactile cues)    Abduction (tactile cues)    External rotation (tactile cues)    Extension    Internal rotation     press   Wall wipes w/1# x20 ea    Flexion    Abduction    Arc   Green Theraband " "resisted postural exercise x20    Rows w/scapular retractions    B extension w/scapular retractions    B external rotation w/scapular retractions    IR    Adduction    Horizontal abduction   Yellow Theraband wall walks x3   BOING' rhythmic stabilization 2x30s ea (R UE)    Flexion with elbow flexed    Abduction with elbow flexed              Sidelying                          External rotation 3 x 10                         Abd to 90*    To be added:   PNF patterns   Scapular stabilizing ex  Review body mechanics for ADLs    Did not perform:  Overhead lifting   B UE w/3# x10   R UE w/2# x4    manual therapy techniques: Soft tissue mobilizations, Joint mobilizations, Passive range of motion, and Scapular scouring were applied to the: R shoulder for 8 minutes, including:  Grade II superior to inferior R GH glides and anterior to posterior glides  Scapular scouring in L sidelying  STM: strumming to posterior shoulder & bicep  Rhythmic Stabilization 2 x 30"            Supine shoulder at 90* flex            S/L at 90* flex            Supine elbow flexed and shoulder abd to 45*      Patient Education and Home Exercises     Home Exercises Provided and Patient Education Provided     Education provided:   - Proper shoulder arthrokinematics were reviewed with the patient.     Written Home Exercises Provided: Patient instructed to cont prior HEP. Exercises were reviewed and Jonathan was able to demonstrate them prior to the end of the session.  Jonathan demonstrated fair  understanding of the education provided. See EMR under Patient Instructions for exercises provided during therapy sessions    ASSESSMENT     The patient reported to PT today without stating experiencing pain. Jonathan Hinton was slightly progressed with increased resistance on UBE, but due was not progressed further to address technique and form, main focus on scapular mobility with resisted upper extremity elevation. Patient requires tactile cues to perform " correctly. Crepitus noted during cane exercises. Louvenia demonstrates increased tension in upper/middle trapezii and rhomboids.    Louvenia Is progressing fair towards her goals.   Pt prognosis is Fair.     Pt will continue to benefit from skilled outpatient physical therapy to address the deficits listed in the problem list box on initial evaluation, provide pt/family education and to maximize pt's level of independence in the home and community environment.     Pt's spiritual, cultural and educational needs considered and pt agreeable to plan of care and goals.     Anticipated barriers to physical therapy: Postural changes may interfere with potential for progress.     Goals:  Short Term Goals: 3 weeks   1) Facilitate improved joint play Progressing  2) Facilitate improved upper quadrant flexibility Progressing   3) Facilitate improved posture Slow progress  4) Patient will perform hair care activities > 5 min without increased pain/discomfort 5 of 10 trials Progressing     Long Term Goals: 6 weeks   1) Patient will consistently perform hair care activities without increased pain/discomfort Progressing/nearly met  2) Patient will consistently reach to at least 2nd shelf in cabinet using her R UE without difficulty Progressing/nearly met  3) Patient will consistently reach behind her (as in reaching into backseat of car) without increased pain/difficulty Gradually progressing  4) Improve functional deficit to < 30% as indicated by FOTO shoulder survey Regressed on this date  5) Patient will be independent in complete HEP for shoulder flexibility, ROM, and strength. Progressing    PLAN     POC: Outpatient Physical Therapy 2 times weekly for 6 weeks to include the following interventions: Electrical Stimulation IFC/TENS, Manual Therapy, Moist Heat/ Ice, Patient Education, Therapeutic Activities, Therapeutic Exercise, and Functional Dry Needling.     The patient is to be progressed within the established plan of care  as tolerated in order to accomplish goals as stated above. Plan to instruct in body mechanics for activities of daily living in subsequent session(s). Promote scapulothoracic mobility and true shoulder flexion without shoulder hiking/elevation actively. Plan to incorporate PNF patterns for increased function, especially with reaching into back seat, and address scapular stability in subsequent session.    Daina Del Cid, PTA

## 2023-05-23 ENCOUNTER — CLINICAL SUPPORT (OUTPATIENT)
Dept: REHABILITATION | Facility: HOSPITAL | Age: 73
End: 2023-05-23
Payer: MEDICARE

## 2023-05-23 DIAGNOSIS — M25.511 CHRONIC RIGHT SHOULDER PAIN: ICD-10-CM

## 2023-05-23 DIAGNOSIS — M19.211 SECONDARY OSTEOARTHRITIS OF RIGHT SHOULDER DUE TO ROTATOR CUFF ARTHROPATHY: ICD-10-CM

## 2023-05-23 DIAGNOSIS — R68.89 ACTIVITY INTOLERANCE: ICD-10-CM

## 2023-05-23 DIAGNOSIS — Z78.9 SELF-CARE DEFICIT: Primary | ICD-10-CM

## 2023-05-23 DIAGNOSIS — G89.29 CHRONIC RIGHT SHOULDER PAIN: ICD-10-CM

## 2023-05-23 PROCEDURE — 97140 MANUAL THERAPY 1/> REGIONS: CPT | Mod: PN,CQ

## 2023-05-23 PROCEDURE — 97110 THERAPEUTIC EXERCISES: CPT | Mod: PN,CQ

## 2023-05-23 NOTE — PROGRESS NOTES
"[facility]   Physical Therapy Treatment Note    Name: Jonathan Hinton  Clinic Number: [id number]    Therapy Diagnosis:   Encounter Diagnoses   Name Primary?    Self-care deficit Yes    Activity intolerance     Chronic right shoulder pain     Secondary osteoarthritis of right shoulder due to rotator cuff arthropathy      Physician: Rodríguez Wilson,*    Visit Date: 5/23/2023    Physician Orders: PT Eval and Treat   Medical Diagnosis from Referral: Secondary osteoarthritis of right shoulder due to rotator cuff arthropathy   Evaluation Date: 4/17/2023  Authorization Period Expiration: 05/17/2023   Plan of Care Expiration: 5/31/2023  Progress Note Due: 5/17/2023  Visit # / Visits authorized: 8/ 20 (9 total)  FOTO: Completed 5/4/2023 52% Residual Deficit  Next survey due week of 5/22/2023     Precautions: Standard     PTA Visit #: 2/5     Time In: 0804  Time Out: 0905  Total Billable Time: 60 minutes    SUBJECTIVE     Pt reports: "No, no pain."  She was somewhat compliant with home exercise program.  Response to previous treatment: "was good"  Functional change: N/A at this time    Pain: 0/10  Location: right shoulder    OBJECTIVE     Objective Measures updated at progress report unless specified.     Treatment     Jonathan received the treatments listed below:      therapeutic exercises to develop strength, endurance, ROM, flexibility, and posture for 52 minutes including:  UBE L3.5 x 8 min (4' forward/4' back)  Seated upper trapezius stretch 3x30s ea   Levator scapulae stretch 3x30s ea   Middle trapezius/Rhomboid stretch 3x30s              Cervical retraction 2x10 with tactile cueing for technique  Standing doorway pectoral stretch 3x30s  3# Therabar AAROM x20 ea    R flexion (tactile cues)    Abduction (tactile cues)    External rotation (tactile cues)    Extension    Internal rotation     press   Wall wipes w/2# x20 ea    Flexion    Abduction    Arc   Green Theraband resisted " "postural exercise x20    Rows w/scapular retractions    B extension w/scapular retractions    B external rotation w/scapular retractions    IR    Adduction    Horizontal abduction   Yellow Theraband assisted PNF patterns x10 ea    D1 flex/ext    D2 flex/ext   Yellow Theraband wall walks x3   BOING' rhythmic stabilization 2x30s ea (R UE)    Flexion with elbow flexed    Abduction with elbow flexed    B overhead              Sidelying w/1# 2x10 ea                         External rotation                          Abd to 90*    To be added:   Scapular stabilizing ex  Review body mechanics for ADLs    Did not perform:  Overhead lifting   B UE w/3# x10   R UE w/2# x4    manual therapy techniques: Soft tissue mobilizations, Joint mobilizations, Passive range of motion, and Scapular scouring were applied to the: R shoulder for 8 minutes, including:  Grade II superior to inferior R GH glides and anterior to posterior glides  Scapular scouring in L side-lying    Did not perform on this date:  STM: strumming to posterior shoulder & bicep  Rhythmic Stabilization 2 x 30"            Supine shoulder at 90* flex            S/L at 90* flex            Supine elbow flexed and shoulder abd to 45*      Patient Education and Home Exercises     Home Exercises Provided and Patient Education Provided     Education provided:   - The patient was progressed with additional therapeutic exercise as stated above in bold print.    Written Home Exercises Provided: Patient instructed to cont prior HEP. Exercises were reviewed and Jonathan was able to demonstrate them prior to the end of the session.  Jonathan demonstrated fair  understanding of the education provided. See EMR under Patient Instructions for exercises provided during therapy sessions.    ASSESSMENT     The patient continues to report no pain in difficulty with shoulder, but during treatment experiences significant difficulty with shoulder elevation exercises without compensating, as " "stated in previous sessions. Jonathan Hinton was progressed with additional therapeutic exercise today for increased function and stability. The patient required assistance with resisted PNF patterns for range and proper form.    Jonathan Is progressing fair towards her goals.   Pt prognosis is Fair.     Pt will continue to benefit from skilled outpatient physical therapy to address the deficits listed in the problem list box on initial evaluation, provide pt/family education and to maximize pt's level of independence in the home and community environment.     Pt's spiritual, cultural and educational needs considered and pt agreeable to plan of care and goals.     Anticipated barriers to physical therapy: Postural changes may interfere with potential for progress.     Goals:  Short Term Goals: 3 weeks   1) Facilitate improved joint play Progressing  2) Facilitate improved upper quadrant flexibility Progressing   3) Facilitate improved posture Slow progress  4) Patient will perform hair care activities > 5 min without increased pain/discomfort 5 of 10 trials Met 5/23/2023     Long Term Goals: 6 weeks   1) Patient will consistently perform hair care activities without increased pain/discomfort Met 5/23/2023 per pt report  2) Patient will consistently reach to at least 2nd shelf in cabinet using her R UE without difficulty Progressing/nearly met "just a little bit" of difficulty "no pain"  3) Patient will consistently reach behind her (as in reaching into backseat of car) without increased pain/difficulty Met per pt report 5/23/2023  4) Improve functional deficit to < 30% as indicated by FOTO shoulder survey Regressed on this date  5) Patient will be independent in complete HEP for shoulder flexibility, ROM, and strength. Progressing    PLAN     POC: Outpatient Physical Therapy 2 times weekly for 6 weeks to include the following interventions: Electrical Stimulation IFC/TENS, Manual Therapy, Moist Heat/ Ice, Patient " Education, Therapeutic Activities, Therapeutic Exercise, and Functional Dry Needling.     The patient is to be progressed within the established plan of care as tolerated in order to accomplish goals as stated above. Plan to instruct in body mechanics for activities of daily living in subsequent session(s). Promote scapulothoracic mobility and true shoulder flexion without shoulder hiking/elevation actively.    Daina Del Cid, PTA

## 2023-05-25 ENCOUNTER — CLINICAL SUPPORT (OUTPATIENT)
Dept: REHABILITATION | Facility: HOSPITAL | Age: 73
End: 2023-05-25
Payer: MEDICARE

## 2023-05-25 DIAGNOSIS — M19.211 SECONDARY OSTEOARTHRITIS OF RIGHT SHOULDER DUE TO ROTATOR CUFF ARTHROPATHY: Primary | ICD-10-CM

## 2023-05-25 DIAGNOSIS — M25.511 CHRONIC RIGHT SHOULDER PAIN: ICD-10-CM

## 2023-05-25 DIAGNOSIS — G89.29 CHRONIC RIGHT SHOULDER PAIN: ICD-10-CM

## 2023-05-25 DIAGNOSIS — Z78.9 SELF-CARE DEFICIT: ICD-10-CM

## 2023-05-25 DIAGNOSIS — R68.89 ACTIVITY INTOLERANCE: ICD-10-CM

## 2023-05-25 PROCEDURE — 97110 THERAPEUTIC EXERCISES: CPT | Mod: PN,CQ

## 2023-05-25 PROCEDURE — 97140 MANUAL THERAPY 1/> REGIONS: CPT | Mod: PN,CQ

## 2023-05-25 NOTE — PROGRESS NOTES
"[facility]   Physical Therapy Treatment Note    Name: Jonathan Hinton  Clinic Number: [id number]    Therapy Diagnosis:   Encounter Diagnoses   Name Primary?    Self-care deficit     Activity intolerance     Chronic right shoulder pain     Secondary osteoarthritis of right shoulder due to rotator cuff arthropathy Yes     Physician: Rodríguez Wilson,*    Visit Date: 5/25/2023    Physician Orders: PT Eval and Treat   Medical Diagnosis from Referral: Secondary osteoarthritis of right shoulder due to rotator cuff arthropathy   Evaluation Date: 4/17/2023  Authorization Period Expiration: 05/17/2023   Plan of Care Expiration: 5/31/2023  Progress Note Due: 5/17/2023  Visit # / Visits authorized: 9/ 20 (10 total)  FOTO: Completed 5/25/2023 36% Residual Deficit  Next survey due week of 6/12/2023     Precautions: Standard    PTA Visit #: 3/5     Time In: 1010  Time Out: 1115  Total Billable Time: 60 minutes    SUBJECTIVE     Pt reports: "I little sore from other stuff."  She was somewhat compliant with home exercise program.  Response to previous treatment: "was good"  Functional change: N/A at this time    Pain: 0/10  Location: right shoulder    OBJECTIVE     Objective Measures updated at progress report unless specified.     Treatment     Jonathan received the treatments listed below:      therapeutic exercises to develop strength, endurance, ROM, flexibility, and posture for 52 minutes including:  UBE L3.5 x 8 min (4' forward/4' back) (increase resistance)  Seated upper trapezius stretch 3x30s ea (DC to HEP)   Levator scapulae stretch 3x30s ea (DC to HEP)   Middle trapezius/Rhomboid stretch 3x30s (DC to HEP)              Cervical retraction 2x10 with tactile cueing for technique  Standing doorway pectoral stretch 3x30s (DC to HEP)  3# Therabar AAROM x20 ea    R flexion (tactile cues)    Abduction (tactile cues)    External rotation (tactile cues)    Extension    Internal rotation     " "press   Wall wipes w/2# (1# around wrist & 1 around upper arm) 2x10 ea    Flexion    Abduction    Arc   Green Theraband resisted postural exercise x20    Rows w/scapular retractions    B extension w/scapular retractions    B external rotation w/scapular retractions    IR    Adduction    Horizontal abduction   Yellow Theraband PNF patterns 2x10 ea    D1 flex/ext AROM    D2 flex/ext assisted   Yellow Theraband wall walks x4   BOING' rhythmic stabilization (R UE)    Flexion with elbow flexed 2x30s    Abduction with elbow flexed x1'    B overhead 2x30s               Sidelying w/1# x20 ea                         External rotation                          Abd to 90* (increase resistance)    To be added:   Scapular stabilizing ex  Review body mechanics for ADLs (cleaning, lifting, yardwork)    Did not perform:  Overhead lifting   B UE w/3# x10   R UE w/2# x4    manual therapy techniques: Soft tissue mobilizations, Joint mobilizations, Passive range of motion, and Scapular scouring were applied to the: R shoulder for 8 minutes, including:  Grade II superior to inferior R GH glides and anterior to posterior glides  Scapular scouring in L side-lying    Did not perform on this date:  STM: strumming to posterior shoulder & bicep  Rhythmic Stabilization 2 x 30"            Supine shoulder at 90* flex            S/L at 90* flex            Supine elbow flexed and shoulder abd to 45*      Patient Education and Home Exercises     Home Exercises Provided and Patient Education Provided     Education provided:   - The patient was progressed with increased exercise complexity as stated above in bold print.    Written Home Exercises Provided: Patient instructed to cont prior HEP. Exercises were reviewed and Jonathan was able to demonstrate them prior to the end of the session.  Jonathan demonstrated fair  understanding of the education provided. See EMR under Patient Instructions for exercises provided during therapy " "sessions.    ASSESSMENT     The patient continues to report to PT without stating experiencing pain. Jonathan Hinton was progressed with increased exercise complexity. Patient continues to require tactile cues to perform therapeutic exercise correctly, as stated above. The patient continues to struggle with resisted shoulder elevation. Jonathan was able to perform wall wipes easier, distributing weight throughout the arm instead of solely at wrist, but can increase complexity once patient improves. Crepitus noted with initial joint mobilization and during scapular scouring. Patient continues to not report pain, and denies modalities post.    Jonathan Is progressing fair towards her goals.   Pt prognosis is Fair.     Pt will continue to benefit from skilled outpatient physical therapy to address the deficits listed in the problem list box on initial evaluation, provide pt/family education and to maximize pt's level of independence in the home and community environment.     Pt's spiritual, cultural and educational needs considered and pt agreeable to plan of care and goals.     Anticipated barriers to physical therapy: Postural changes may interfere with potential for progress.     Goals:  Short Term Goals: 3 weeks   1) Facilitate improved joint play Progressing  2) Facilitate improved upper quadrant flexibility Progressing   3) Facilitate improved posture Slow progress  4) Patient will perform hair care activities > 5 min without increased pain/discomfort 5 of 10 trials Met 5/23/2023     Long Term Goals: 6 weeks   1) Patient will consistently perform hair care activities without increased pain/discomfort Met 5/23/2023 per pt report  2) Patient will consistently reach to at least 2nd shelf in cabinet using her R UE without difficulty Progressing/nearly met "just a little bit" of difficulty "no pain"  3) Patient will consistently reach behind her (as in reaching into backseat of car) without increased pain/difficulty Met " per pt report 5/23/2023  4) Improve functional deficit to < 30% as indicated by FOTO shoulder survey Progressing  5) Patient will be independent in complete HEP for shoulder flexibility, ROM, and strength. Progressing    PLAN     POC: Outpatient Physical Therapy 2 times weekly for 6 weeks to include the following interventions: Electrical Stimulation IFC/TENS, Manual Therapy, Moist Heat/ Ice, Patient Education, Therapeutic Activities, Therapeutic Exercise, and Functional Dry Needling.     The patient is to be progressed within the established plan of care as tolerated in order to accomplish goals as stated above. Plan to instruct in body mechanics for activities of daily living in subsequent session(s). Increase exercise complexity to promote increased functional strength and endurance. Promote scapulothoracic mobility and true shoulder flexion without shoulder hiking/elevation actively.    Daina Del Cid, PTA

## 2023-06-01 ENCOUNTER — CLINICAL SUPPORT (OUTPATIENT)
Dept: REHABILITATION | Facility: HOSPITAL | Age: 73
End: 2023-06-01
Payer: MEDICARE

## 2023-06-01 DIAGNOSIS — Z78.9 SELF-CARE DEFICIT: Primary | ICD-10-CM

## 2023-06-01 DIAGNOSIS — R68.89 ACTIVITY INTOLERANCE: ICD-10-CM

## 2023-06-01 DIAGNOSIS — M19.211 SECONDARY OSTEOARTHRITIS OF RIGHT SHOULDER DUE TO ROTATOR CUFF ARTHROPATHY: ICD-10-CM

## 2023-06-01 DIAGNOSIS — M25.511 CHRONIC RIGHT SHOULDER PAIN: ICD-10-CM

## 2023-06-01 DIAGNOSIS — G89.29 CHRONIC RIGHT SHOULDER PAIN: ICD-10-CM

## 2023-06-01 PROCEDURE — 97140 MANUAL THERAPY 1/> REGIONS: CPT | Mod: PN

## 2023-06-01 PROCEDURE — 97110 THERAPEUTIC EXERCISES: CPT | Mod: PN

## 2023-06-01 NOTE — PROGRESS NOTES
"[facility]   Physical Therapy Treatment Note    Name: Jonathan Hinton  Clinic Number: [id number]    Therapy Diagnosis:   Encounter Diagnoses   Name Primary?    Self-care deficit Yes    Activity intolerance     Chronic right shoulder pain     Secondary osteoarthritis of right shoulder due to rotator cuff arthropathy      Physician: Rodríguez Wilson,*    Visit Date: 6/1/2023    Physician Orders: PT Eval and Treat   Medical Diagnosis from Referral: Secondary osteoarthritis of right shoulder due to rotator cuff arthropathy   Evaluation Date: 4/17/2023  Authorization Period Expiration: 05/17/2023   Plan of Care Expiration: 5/31/2023   Progress Note Due: 5/17/2023   Visit # / Visits authorized: 10/ 20 (11 total)  FOTO: Completed 6/1/2023 24% Residual Deficit     Precautions: Standard    PTA Visit #: 0/5     Time In: 0805  Time Out: 0905  Total Billable Time: 53 minutes    SUBJECTIVE     Pt reports: "It's good. I can reach into my cabinet now."  She was somewhat compliant with home exercise program.  Response to previous treatment: "Good"  Functional change: Can reach into cabinet.     Pain: 0/10  Location: right shoulder    Current Level of Function: Able to perform self care/hair dressing without significant difficulty; able to reach onto 2nd shelf of cabinet using R UE without difficulty. Has resumed housework and yard work.      OBJECTIVE     Posture: Standing:  Shoulders are level, minimal rounded shoulder posture.  Sitting: Minimal lumbar lordosis, minimal increased thoracic kyphosis, minimal to moderate rounded shoulder and forward head posture  Palpation: No specific tenderness noted but minimally increased muscle tension R upper traps and levator scapula  ROM:  R shoulder WNL.  Continues to demonstrate R scapulothoracic dysfunction  Strength: R shoulder Flexion, abduction, and ER 3+/5; IR, extension, adduction 4+/5    Treatment     Jonathan received the treatments listed below:  " "    therapeutic exercises to develop strength, endurance, ROM, flexibility, and posture for 43 minutes including:  UBE L4 x 8 min (4' forward/4' back)  Cervical retraction 2x10 with tactile cueing for technique  Standing   3# Therabar AAROM x20 ea    Flexion (tactile cues)    R Abduction (tactile cues)    External rotation (tactile cues)    Extension    Internal rotation     press   Wall wipes w/2# (1# around wrist & 1 around upper arm) 2x10 ea    Flexion    Abduction    Arc   Green Theraband resisted postural exercise x20    Rows w/scapular retractions    B extension w/scapular retractions    B external rotation w/scapular retractions    IR    Adduction    Horizontal abduction   Yellow Theraband PNF patterns 2x10 ea    D1 flex/ext AROM    D2 flex/ext assisted   BOING' rhythmic stabilization (R UE)    Flexion x 1'    Abduction x1'    B overhead 2x30s               Sidelying w/2# x20 ea                         External rotation                          Abd to 90*  Reviewed body mechanics for ADLs (cleaning, lifting, yardwork)    manual therapy techniques: Soft tissue mobilizations, Joint mobilizations, Passive range of motion, and Scapular scouring were applied to the: R shoulder for 10 minutes, including:  Grade II superior to inferior R GH glides and anterior to posterior glides  Scapular scouring in L side-lying    Did not perform on this date:  STM: strumming to posterior shoulder & bicep  Rhythmic Stabilization 2 x 30"            Supine shoulder at 90* flex            S/L at 90* flex            Supine elbow flexed and shoulder abd to 45*      Patient Education and Home Exercises     Home Exercises Provided and Patient Education Provided     Education provided:   - The patient was progressed with increased exercise complexity as stated above in bold print.    Written Home Exercises Provided: Patient instructed to cont prior HEP. Exercises were reviewed and Jonathan was able to demonstrate them prior to " the end of the session.  Jonathan demonstrated fair  understanding of the education provided. See EMR under Patient Instructions for exercises provided during therapy sessions.    ASSESSMENT     The patient has made steady progress in PT with improved shoulder ROM, improved shoulder strength, minimally improved posture.  Tenderness and muscle tension have decreased.  These improvements have contributed to improved functional use of R UE for reaching, participating in household activities, and for participating in yard work tasks.  Her progress has reached a point where she can continue strengthening as part of her home exercise program.  She is ready for discharge.     Jonathan Is progressing fair towards her goals.   Pt prognosis is Fair.     Pt will continue to benefit from skilled outpatient physical therapy to address the deficits listed in the problem list box on initial evaluation, provide pt/family education and to maximize pt's level of independence in the home and community environment.     Pt's spiritual, cultural and educational needs considered and pt agreeable to plan of care and goals.     Anticipated barriers to physical therapy: Postural changes may interfere with potential for progress.     Goals:  Short Term Goals: 3 weeks   1) Facilitate improved joint play Met 6/1/2023  2) Facilitate improved upper quadrant flexibility Met 6/1/2023  3) Facilitate improved posture Partially met  4) Patient will perform hair care activities > 5 min without increased pain/discomfort 5 of 10 trials Met 5/23/2023     Long Term Goals: 6 weeks   1) Patient will consistently perform hair care activities without increased pain/discomfort Met 5/23/2023 per pt report  2) Patient will consistently reach to at least 2nd shelf in cabinet using her R UE without difficulty Met 6/1/2023  3) Patient will consistently reach behind her (as in reaching into backseat of car) without increased pain/difficulty Met per pt report  5/23/2023  4) Improve functional deficit to < 30% as indicated by FOTO shoulder survey Met 6/1/2023  5) Patient will be independent in complete HEP for shoulder flexibility, ROM, and strength. Met 6/1/2023    PLAN     Discharge from skilled PT services.  Pt to continue HEP.      Maddi Davalos, PT

## 2023-06-19 ENCOUNTER — LAB VISIT (OUTPATIENT)
Dept: LAB | Facility: HOSPITAL | Age: 73
End: 2023-06-19
Attending: PHYSICIAN ASSISTANT
Payer: MEDICARE

## 2023-06-19 DIAGNOSIS — E89.0 POST-SURGICAL HYPOTHYROIDISM: ICD-10-CM

## 2023-06-19 PROCEDURE — 84443 ASSAY THYROID STIM HORMONE: CPT | Performed by: PHYSICIAN ASSISTANT

## 2023-06-19 PROCEDURE — 36415 COLL VENOUS BLD VENIPUNCTURE: CPT | Mod: PO | Performed by: PHYSICIAN ASSISTANT

## 2023-06-19 PROCEDURE — 84439 ASSAY OF FREE THYROXINE: CPT | Performed by: PHYSICIAN ASSISTANT

## 2023-06-20 LAB
T4 FREE SERPL-MCNC: 1.29 NG/DL (ref 0.71–1.51)
TSH SERPL DL<=0.005 MIU/L-ACNC: 0.65 UIU/ML (ref 0.4–4)

## 2023-06-27 ENCOUNTER — OFFICE VISIT (OUTPATIENT)
Dept: FAMILY MEDICINE | Facility: CLINIC | Age: 73
End: 2023-06-27
Payer: MEDICARE

## 2023-06-27 VITALS
TEMPERATURE: 99 F | WEIGHT: 118.81 LBS | DIASTOLIC BLOOD PRESSURE: 80 MMHG | HEART RATE: 60 BPM | OXYGEN SATURATION: 96 % | BODY MASS INDEX: 22.43 KG/M2 | SYSTOLIC BLOOD PRESSURE: 124 MMHG | HEIGHT: 61 IN

## 2023-06-27 DIAGNOSIS — G57.23 FEMORAL NEUROPATHY OF LOWER EXTREMITY, BILATERAL: ICD-10-CM

## 2023-06-27 DIAGNOSIS — F17.200 TOBACCO DEPENDENCE: ICD-10-CM

## 2023-06-27 DIAGNOSIS — E03.9 HYPOTHYROIDISM, UNSPECIFIED TYPE: ICD-10-CM

## 2023-06-27 DIAGNOSIS — R79.9 ABNORMAL FINDING OF BLOOD CHEMISTRY, UNSPECIFIED: ICD-10-CM

## 2023-06-27 DIAGNOSIS — Z13.220 ENCOUNTER FOR LIPID SCREENING FOR CARDIOVASCULAR DISEASE: ICD-10-CM

## 2023-06-27 DIAGNOSIS — R05.3 CHRONIC COUGH: ICD-10-CM

## 2023-06-27 DIAGNOSIS — R91.1 SOLITARY PULMONARY NODULE: Primary | ICD-10-CM

## 2023-06-27 DIAGNOSIS — Z13.6 ENCOUNTER FOR LIPID SCREENING FOR CARDIOVASCULAR DISEASE: ICD-10-CM

## 2023-06-27 DIAGNOSIS — R63.4 WEIGHT LOSS: ICD-10-CM

## 2023-06-27 PROCEDURE — 99214 PR OFFICE/OUTPT VISIT, EST, LEVL IV, 30-39 MIN: ICD-10-PCS | Mod: ,,, | Performed by: FAMILY MEDICINE

## 2023-06-27 PROCEDURE — 1159F PR MEDICATION LIST DOCUMENTED IN MEDICAL RECORD: ICD-10-PCS | Mod: CPTII,,, | Performed by: FAMILY MEDICINE

## 2023-06-27 PROCEDURE — 3079F PR MOST RECENT DIASTOLIC BLOOD PRESSURE 80-89 MM HG: ICD-10-PCS | Mod: CPTII,,, | Performed by: FAMILY MEDICINE

## 2023-06-27 PROCEDURE — 1101F PR PT FALLS ASSESS DOC 0-1 FALLS W/OUT INJ PAST YR: ICD-10-PCS | Mod: CPTII,,, | Performed by: FAMILY MEDICINE

## 2023-06-27 PROCEDURE — 3074F SYST BP LT 130 MM HG: CPT | Mod: CPTII,,, | Performed by: FAMILY MEDICINE

## 2023-06-27 PROCEDURE — 3008F PR BODY MASS INDEX (BMI) DOCUMENTED: ICD-10-PCS | Mod: CPTII,,, | Performed by: FAMILY MEDICINE

## 2023-06-27 PROCEDURE — 3074F PR MOST RECENT SYSTOLIC BLOOD PRESSURE < 130 MM HG: ICD-10-PCS | Mod: CPTII,,, | Performed by: FAMILY MEDICINE

## 2023-06-27 PROCEDURE — 99214 OFFICE O/P EST MOD 30 MIN: CPT | Mod: ,,, | Performed by: FAMILY MEDICINE

## 2023-06-27 PROCEDURE — 1101F PT FALLS ASSESS-DOCD LE1/YR: CPT | Mod: CPTII,,, | Performed by: FAMILY MEDICINE

## 2023-06-27 PROCEDURE — 4010F ACE/ARB THERAPY RXD/TAKEN: CPT | Mod: CPTII,,, | Performed by: FAMILY MEDICINE

## 2023-06-27 PROCEDURE — 3079F DIAST BP 80-89 MM HG: CPT | Mod: CPTII,,, | Performed by: FAMILY MEDICINE

## 2023-06-27 PROCEDURE — 1125F PR PAIN SEVERITY QUANTIFIED, PAIN PRESENT: ICD-10-PCS | Mod: CPTII,,, | Performed by: FAMILY MEDICINE

## 2023-06-27 PROCEDURE — 1125F AMNT PAIN NOTED PAIN PRSNT: CPT | Mod: CPTII,,, | Performed by: FAMILY MEDICINE

## 2023-06-27 PROCEDURE — 3008F BODY MASS INDEX DOCD: CPT | Mod: CPTII,,, | Performed by: FAMILY MEDICINE

## 2023-06-27 PROCEDURE — 4010F PR ACE/ARB THEARPY RXD/TAKEN: ICD-10-PCS | Mod: CPTII,,, | Performed by: FAMILY MEDICINE

## 2023-06-27 PROCEDURE — 3288F FALL RISK ASSESSMENT DOCD: CPT | Mod: CPTII,,, | Performed by: FAMILY MEDICINE

## 2023-06-27 PROCEDURE — 1159F MED LIST DOCD IN RCRD: CPT | Mod: CPTII,,, | Performed by: FAMILY MEDICINE

## 2023-06-27 PROCEDURE — 3288F PR FALLS RISK ASSESSMENT DOCUMENTED: ICD-10-PCS | Mod: CPTII,,, | Performed by: FAMILY MEDICINE

## 2023-06-27 RX ORDER — GABAPENTIN 300 MG/1
300 CAPSULE ORAL NIGHTLY
Qty: 30 CAPSULE | Refills: 5 | Status: SHIPPED | OUTPATIENT
Start: 2023-06-27 | End: 2023-06-27

## 2023-06-27 RX ORDER — GABAPENTIN 300 MG/1
300 CAPSULE ORAL NIGHTLY
Qty: 30 CAPSULE | Refills: 5 | Status: SHIPPED | OUTPATIENT
Start: 2023-06-27

## 2023-06-27 NOTE — PROGRESS NOTES
Subjective:       Patient ID: Jonathan Hinton is a 73 y.o. female.    Chief Complaint: Follow-up (C/o laurent,leg pain x1 month)    Ms Hinton is a 73-year-old female who is here with complaints of bilateral lower extremity pain.  She also is complaining of significant weight loss over the past couple years.  In reviewing her chart it is noted that in 11/2020 she weighed 154 lb, in 10/2021 she weighed 145, 11/2022 she was at 125 lb in today on 06/27/2023 she weighs 118.8 lb.  She has had a history of hypothyroid but is followed by [facility], endocrinology.  Her numbers have been good.  She likely has a component of peripheral vascular disease due to long-term smoking she started smoking at age 21 and she states that she quit smoking regularly at about age 50-55.  She still smokes occasionally she states she smokes 1-2 cigarettes a day.  The weight loss is also worrisome for some type of carcinoma, we will do a CT chest to rule out carcinoma of the lungs.  She states that she has had a recent colonoscopy but I have not located does results.  Will research that and if colonoscopy is indicated but will be ordered.  There is a chest x-ray available from 04/20/2019 that showed aortic calcification, right basilar atelectasis, calcified granuloma of the lingula and also a degenerative spine was noted.  We will repeat chest x-ray and also do a lumbar spine film.  I feel she likely has some peripheral vascular disease with calcifications of the femoral arteries causing the leg pain however the weight loss needs to be worked up until carcinoma is ruled out    Review of Systems   Constitutional:  Positive for unexpected weight change. Negative for activity change, appetite change, chills, diaphoresis and fever.        Weight loss of approximately 35 lb over the last 2 and half years, unintentional   HENT:  Negative for congestion, ear pain, postnasal drip, rhinorrhea and sore throat.    Eyes:  Negative for pain, discharge, redness  and itching.   Respiratory:  Negative for cough, shortness of breath and wheezing.    Cardiovascular:  Negative for chest pain, palpitations and leg swelling.   Gastrointestinal:  Negative for abdominal distention, abdominal pain, constipation, diarrhea and nausea.   Genitourinary:  Negative for difficulty urinating, dysuria, frequency and urgency.   Musculoskeletal:  Positive for arthralgias, back pain and myalgias.        Bilateral lower extremity pain   Skin:  Negative for color change, rash and wound.   All other systems reviewed and are negative.    Patient Active Problem List   Diagnosis    Postmenopausal    Hyperglycemia    Hyperlipidemia    Hypovitaminosis D    Nodular thyroid disease    Essential hypertension    Osteopenia    Generalized osteoarthritis    Claudication    Abnormal finding on mammography    Abdominal pain    Aneurysm of renal artery    Disorder of bone and articular cartilage    Stricture of sigmoid colon    Chronic right shoulder pain    Activity intolerance    Self-care deficit    Secondary osteoarthritis of right shoulder due to rotator cuff arthropathy       Objective:      Physical Exam  Vitals and nursing note reviewed.   Constitutional:       Appearance: Normal appearance. She is normal weight.   HENT:      Head: Normocephalic.      Nose: Nose normal.   Eyes:      Extraocular Movements: Extraocular movements intact.      Conjunctiva/sclera: Conjunctivae normal.      Pupils: Pupils are equal, round, and reactive to light.   Cardiovascular:      Rate and Rhythm: Normal rate and regular rhythm.      Heart sounds: Normal heart sounds. No murmur heard.  Pulmonary:      Effort: Pulmonary effort is normal. No respiratory distress.      Comments: Decreased breath sounds bilaterally consistent with significant COPD or emphysema  Musculoskeletal:         General: Normal range of motion.      Cervical back: Normal range of motion and neck supple.      Comments: Changes in bilateral lower  "extremities are consistent with decreased blood flow secondary to likely peripheral vascular disease.  Decreased hair, shiny skin, small calves, and a history of a calcified aorta   Skin:     General: Skin is warm and dry.   Neurological:      General: No focal deficit present.      Mental Status: She is alert and oriented to person, place, and time.   Psychiatric:         Mood and Affect: Mood normal.         Behavior: Behavior normal.       Lab Results   Component Value Date    WBC 4.66 11/08/2022    HGB 13.6 11/08/2022    HCT 42.2 11/08/2022     11/08/2022    CHOL 225 (H) 11/08/2022    TRIG 91 11/08/2022    HDL 72 11/08/2022    ALT 19 04/28/2023    AST 22 04/28/2023     04/28/2023    K 4.1 04/28/2023     04/28/2023    CREATININE 0.8 04/28/2023    BUN 11 04/28/2023    CO2 26 04/28/2023    TSH 0.654 06/19/2023    HGBA1C 5.3 05/10/2018     The 10-year ASCVD risk score (Joanna SIMS, et al., 2019) is: 14.7%    Values used to calculate the score:      Age: 73 years      Sex: Female      Is Non- : Yes      Diabetic: No      Tobacco smoker: No      Systolic Blood Pressure: 124 mmHg      Is BP treated: Yes      HDL Cholesterol: 72 mg/dL      Total Cholesterol: 225 mg/dL  Visit Vitals  /80 (BP Location: Right arm, Patient Position: Sitting, BP Method: Medium (Manual))   Pulse 60   Temp 98.5 °F (36.9 °C)   Ht 5' 1" (1.549 m)   Wt 53.9 kg (118 lb 13.3 oz)   SpO2 96%   BMI 22.45 kg/m²      Assessment:       1. Solitary pulmonary nodule    2. Chronic cough    3. Weight loss    4. Femoral neuropathy of lower extremity, bilateral    5. Hypothyroidism, unspecified type    6. Encounter for lipid screening for cardiovascular disease    7. Abnormal finding of blood chemistry, unspecified    8. Tobacco dependence        Plan:       1. Solitary pulmonary nodule  -     CT Chest Without Contrast; Future; Expected date: 06/27/2023    2. Chronic cough  -     CT Chest Without Contrast; Future; " Expected date: 06/27/2023    3. Weight loss  -     Hemoglobin A1C; Future; Expected date: 06/27/2023  -     Microalbumin/Creatinine Ratio, Urine; Future; Expected date: 06/27/2023    4. Femoral neuropathy of lower extremity, bilateral  -     X-Ray Lumbar Spine 5 View; Future; Expected date: 06/27/2023  -     gabapentin (NEURONTIN) 300 MG capsule; Take 1 capsule (300 mg total) by mouth every evening.  Dispense: 30 capsule; Refill: 5    5. Hypothyroidism, unspecified type  -     CBC Auto Differential; Future; Expected date: 06/27/2023  -     Comprehensive Metabolic Panel; Future; Expected date: 06/27/2023  -     TSH; Future; Expected date: 06/27/2023    6. Encounter for lipid screening for cardiovascular disease  -     Lipid Panel; Future; Expected date: 06/27/2023    7. Abnormal finding of blood chemistry, unspecified  -     Hemoglobin A1C; Future; Expected date: 06/27/2023    8. Tobacco dependence  -     X-Ray Chest PA And Lateral; Future; Expected date: 06/27/2023    Other orders  -     Discontinue: gabapentin (NEURONTIN) 300 MG capsule; Take 1 capsule (300 mg total) by mouth every evening.  Dispense: 30 capsule; Refill: 5       Follow up in about 2 weeks (around 7/11/2023), or if symptoms worsen or fail to improve.      Future Appointments       Date Provider Specialty Appt Notes    11/7/2023  Lab s    11/14/2023 [facility] 6 month f/u

## 2023-06-28 ENCOUNTER — LAB VISIT (OUTPATIENT)
Dept: LAB | Facility: CLINIC | Age: 73
End: 2023-06-28
Payer: MEDICARE

## 2023-06-28 ENCOUNTER — HOSPITAL ENCOUNTER (OUTPATIENT)
Dept: RADIOLOGY | Facility: CLINIC | Age: 73
Discharge: HOME | End: 2023-06-28
Attending: FAMILY MEDICINE
Payer: MEDICARE

## 2023-06-28 ENCOUNTER — TELEPHONE (OUTPATIENT)
Dept: FAMILY MEDICINE | Facility: CLINIC | Age: 73
End: 2023-06-28

## 2023-06-28 DIAGNOSIS — Z13.6 ENCOUNTER FOR LIPID SCREENING FOR CARDIOVASCULAR DISEASE: ICD-10-CM

## 2023-06-28 DIAGNOSIS — F17.200 TOBACCO DEPENDENCE: ICD-10-CM

## 2023-06-28 DIAGNOSIS — R63.4 WEIGHT LOSS: ICD-10-CM

## 2023-06-28 DIAGNOSIS — Z13.220 ENCOUNTER FOR LIPID SCREENING FOR CARDIOVASCULAR DISEASE: ICD-10-CM

## 2023-06-28 DIAGNOSIS — G57.23 FEMORAL NEUROPATHY OF LOWER EXTREMITY, BILATERAL: ICD-10-CM

## 2023-06-28 DIAGNOSIS — E03.9 HYPOTHYROIDISM, UNSPECIFIED TYPE: ICD-10-CM

## 2023-06-28 DIAGNOSIS — R79.9 ABNORMAL FINDING OF BLOOD CHEMISTRY, UNSPECIFIED: ICD-10-CM

## 2023-06-28 LAB
ALBUMIN SERPL BCP-MCNC: 3.8 G/DL (ref 3.5–5.2)
ALBUMIN/CREAT UR: 6.3 UG/MG (ref 0–30)
ALP SERPL-CCNC: 32 U/L (ref 55–135)
ALT SERPL W/O P-5'-P-CCNC: 16 U/L (ref 10–44)
ANION GAP SERPL CALC-SCNC: 12 MMOL/L (ref 8–16)
AST SERPL-CCNC: 17 U/L (ref 10–40)
BASOPHILS # BLD AUTO: 0.02 K/UL (ref 0–0.2)
BASOPHILS NFR BLD: 0.5 % (ref 0–1.9)
BILIRUB SERPL-MCNC: 0.5 MG/DL (ref 0.1–1)
BUN SERPL-MCNC: 18 MG/DL (ref 8–23)
CALCIUM SERPL-MCNC: 9.4 MG/DL (ref 8.7–10.5)
CHLORIDE SERPL-SCNC: 103 MMOL/L (ref 95–110)
CHOLEST SERPL-MCNC: 191 MG/DL (ref 120–199)
CHOLEST/HDLC SERPL: 3.4 {RATIO} (ref 2–5)
CO2 SERPL-SCNC: 26 MMOL/L (ref 23–29)
CREAT SERPL-MCNC: 0.8 MG/DL (ref 0.5–1.4)
CREAT UR-MCNC: 176 MG/DL (ref 15–325)
DIFFERENTIAL METHOD: ABNORMAL
EOSINOPHIL # BLD AUTO: 0.1 K/UL (ref 0–0.5)
EOSINOPHIL NFR BLD: 2 % (ref 0–8)
ERYTHROCYTE [DISTWIDTH] IN BLOOD BY AUTOMATED COUNT: 11.2 % (ref 11.5–14.5)
EST. GFR  (NO RACE VARIABLE): >60 ML/MIN/1.73 M^2
GLUCOSE SERPL-MCNC: 88 MG/DL (ref 70–110)
HCT VFR BLD AUTO: 39.4 % (ref 37–48.5)
HDLC SERPL-MCNC: 56 MG/DL (ref 40–75)
HDLC SERPL: 29.3 % (ref 20–50)
HGB BLD-MCNC: 13.1 G/DL (ref 12–16)
IMM GRANULOCYTES # BLD AUTO: 0.01 K/UL (ref 0–0.04)
IMM GRANULOCYTES NFR BLD AUTO: 0.3 % (ref 0–0.5)
LDLC SERPL CALC-MCNC: 119.2 MG/DL (ref 63–159)
LYMPHOCYTES # BLD AUTO: 1.3 K/UL (ref 1–4.8)
LYMPHOCYTES NFR BLD: 32.8 % (ref 18–48)
MCH RBC QN AUTO: 33.3 PG (ref 27–31)
MCHC RBC AUTO-ENTMCNC: 33.2 G/DL (ref 32–36)
MCV RBC AUTO: 100 FL (ref 82–98)
MICROALBUMIN UR DL<=1MG/L-MCNC: 11 UG/ML
MONOCYTES # BLD AUTO: 0.3 K/UL (ref 0.3–1)
MONOCYTES NFR BLD: 7.5 % (ref 4–15)
NEUTROPHILS # BLD AUTO: 2.3 K/UL (ref 1.8–7.7)
NEUTROPHILS NFR BLD: 56.9 % (ref 38–73)
NONHDLC SERPL-MCNC: 135 MG/DL
NRBC BLD-RTO: 0 /100 WBC
PLATELET # BLD AUTO: 229 K/UL (ref 150–450)
PMV BLD AUTO: 10.6 FL (ref 9.2–12.9)
POTASSIUM SERPL-SCNC: 3.6 MMOL/L (ref 3.5–5.1)
PROT SERPL-MCNC: 7.5 G/DL (ref 6–8.4)
RBC # BLD AUTO: 3.93 M/UL (ref 4–5.4)
SODIUM SERPL-SCNC: 141 MMOL/L (ref 136–145)
TRIGL SERPL-MCNC: 79 MG/DL (ref 30–150)
TSH SERPL DL<=0.005 MIU/L-ACNC: 0.58 UIU/ML (ref 0.4–4)
WBC # BLD AUTO: 3.99 K/UL (ref 3.9–12.7)

## 2023-06-28 PROCEDURE — 71046 X-RAY EXAM CHEST 2 VIEWS: CPT | Mod: 26,,, | Performed by: FAMILY MEDICINE

## 2023-06-28 PROCEDURE — 80053 COMPREHEN METABOLIC PANEL: CPT | Performed by: FAMILY MEDICINE

## 2023-06-28 PROCEDURE — 71046 XR CHEST PA AND LATERAL: ICD-10-PCS | Mod: TC,,, | Performed by: RADIOLOGY

## 2023-06-28 PROCEDURE — 83036 HEMOGLOBIN GLYCOSYLATED A1C: CPT | Performed by: FAMILY MEDICINE

## 2023-06-28 PROCEDURE — 71046 XR CHEST PA AND LATERAL: ICD-10-PCS | Mod: 26,,, | Performed by: FAMILY MEDICINE

## 2023-06-28 PROCEDURE — 72110 X-RAY EXAM L-2 SPINE 4/>VWS: CPT | Mod: 26,,, | Performed by: FAMILY MEDICINE

## 2023-06-28 PROCEDURE — 71046 X-RAY EXAM CHEST 2 VIEWS: CPT | Mod: TC,,, | Performed by: RADIOLOGY

## 2023-06-28 PROCEDURE — 82570 ASSAY OF URINE CREATININE: CPT | Performed by: FAMILY MEDICINE

## 2023-06-28 PROCEDURE — 85025 COMPLETE CBC W/AUTO DIFF WBC: CPT | Performed by: FAMILY MEDICINE

## 2023-06-28 PROCEDURE — 84443 ASSAY THYROID STIM HORMONE: CPT | Performed by: FAMILY MEDICINE

## 2023-06-28 PROCEDURE — 72110 XR LUMBAR SPINE COMPLETE 5 VIEW: ICD-10-PCS | Mod: 26,,, | Performed by: FAMILY MEDICINE

## 2023-06-28 PROCEDURE — 80061 LIPID PANEL: CPT | Performed by: FAMILY MEDICINE

## 2023-06-28 PROCEDURE — 72110 X-RAY EXAM L-2 SPINE 4/>VWS: CPT | Mod: TC,,, | Performed by: RADIOLOGY

## 2023-06-28 PROCEDURE — 72110 XR LUMBAR SPINE COMPLETE 5 VIEW: ICD-10-PCS | Mod: TC,,, | Performed by: RADIOLOGY

## 2023-06-28 NOTE — TELEPHONE ENCOUNTER
Called and spoke with patient - patient reports someone had reached out to her to schedule her CT that was recently ordered by Dr. Claire. Went ahead and scheduled with patient for tomorrow, as patient requested one of the soonest availabilities.

## 2023-06-28 NOTE — TELEPHONE ENCOUNTER
----- Message from Jennie Pascal sent at 6/28/2023  2:16 PM CDT -----  Contact: Patient  Type:  Patient Returning Call    Who Called:  Patient  Who Left Message for Patient:  Doesn't know  Does the patient know what this is regarding?:  Possible appointment    Would the patient rather a call back or a response via [facility]?  Call back  Best Call Back Number:  [phone]    Additional Information: States she is returning a missed phone call - please call to advise - thank you

## 2023-06-29 ENCOUNTER — HOSPITAL ENCOUNTER (OUTPATIENT)
Dept: RADIOLOGY | Facility: HOSPITAL | Age: 73
Discharge: HOME | End: 2023-06-29
Attending: FAMILY MEDICINE
Payer: MEDICARE

## 2023-06-29 DIAGNOSIS — I72.2 RENAL ARTERY ANEURYSM: ICD-10-CM

## 2023-06-29 DIAGNOSIS — R91.1 SOLITARY PULMONARY NODULE: Primary | ICD-10-CM

## 2023-06-29 DIAGNOSIS — R91.1 SOLITARY PULMONARY NODULE: ICD-10-CM

## 2023-06-29 DIAGNOSIS — R10.31 RIGHT LOWER QUADRANT ABDOMINAL PAIN: ICD-10-CM

## 2023-06-29 DIAGNOSIS — R05.3 CHRONIC COUGH: ICD-10-CM

## 2023-06-29 DIAGNOSIS — N28.89 RENAL MASS, RIGHT: ICD-10-CM

## 2023-06-29 LAB
ESTIMATED AVG GLUCOSE: 100 MG/DL (ref 68–131)
HBA1C MFR BLD: 5.1 % (ref 4–5.6)

## 2023-06-29 PROCEDURE — 71250 CT THORAX DX C-: CPT | Mod: 26,,, | Performed by: RADIOLOGY

## 2023-06-29 PROCEDURE — 71250 CT CHEST WITHOUT CONTRAST: ICD-10-PCS | Mod: 26,,, | Performed by: RADIOLOGY

## 2023-06-29 PROCEDURE — 71250 CT THORAX DX C-: CPT | Mod: TC

## 2023-07-03 ENCOUNTER — DOCUMENTATION ONLY (OUTPATIENT)
Dept: REHABILITATION | Facility: HOSPITAL | Age: 73
End: 2023-07-03
Payer: MEDICARE

## 2023-07-03 DIAGNOSIS — Z78.9 SELF-CARE DEFICIT: ICD-10-CM

## 2023-07-03 DIAGNOSIS — M25.511 CHRONIC RIGHT SHOULDER PAIN: ICD-10-CM

## 2023-07-03 DIAGNOSIS — G89.29 CHRONIC RIGHT SHOULDER PAIN: ICD-10-CM

## 2023-07-03 DIAGNOSIS — R68.89 ACTIVITY INTOLERANCE: ICD-10-CM

## 2023-07-03 DIAGNOSIS — M19.211 SECONDARY OSTEOARTHRITIS OF RIGHT SHOULDER DUE TO ROTATOR CUFF ARTHROPATHY: Primary | ICD-10-CM

## 2023-07-03 NOTE — PROGRESS NOTES
[facility]  PT Discharge Note    Name: Jonathan Hinton  Clinic Number: [id number]    Therapy Diagnosis:   Encounter Diagnoses   Name Primary?    Secondary osteoarthritis of right shoulder due to rotator cuff arthropathy Yes    Activity intolerance     Chronic right shoulder pain     Self-care deficit      Physician: Rodríguez Wilson,*     Physician Orders: PT Eval and Treat   Medical Diagnosis from Referral: Secondary osteoarthritis of right shoulder due to rotator cuff arthropathy   Evaluation Date: 4/17/2023    Date of Last visit: 6/1/2023  Total Visits Received: 11    ASSESSMENT      See final treatment note for status    Discharge reason: Patient has met all of his/her goals    Discharge FOTO Score: Completed 6/1/2023 24% Residual Deficit     Goals: see final treatment note  Short Term Goals: 3 weeks   1) Facilitate improved joint play Met 6/1/2023  2) Facilitate improved upper quadrant flexibility Met 6/1/2023  3) Facilitate improved posture Partially met  4) Patient will perform hair care activities > 5 min without increased pain/discomfort 5 of 10 trials Met 5/23/2023     Long Term Goals: 6 weeks   1) Patient will consistently perform hair care activities without increased pain/discomfort Met 5/23/2023 per pt report  2) Patient will consistently reach to at least 2nd shelf in cabinet using her R UE without difficulty Met 6/1/2023  3) Patient will consistently reach behind her (as in reaching into backseat of car) without increased pain/difficulty Met per pt report 5/23/2023  4) Improve functional deficit to < 30% as indicated by FOTO shoulder survey Met 6/1/2023  5) Patient will be independent in complete HEP for shoulder flexibility, ROM, and strength. Met 6/1/2023    PLAN   This patient is discharged from Physical Therapy      Maddi Davalos, PT

## 2023-07-11 ENCOUNTER — OFFICE VISIT (OUTPATIENT)
Dept: FAMILY MEDICINE | Facility: CLINIC | Age: 73
End: 2023-07-11
Payer: MEDICARE

## 2023-07-11 VITALS
HEIGHT: 61 IN | WEIGHT: 118.81 LBS | RESPIRATION RATE: 17 BRPM | BODY MASS INDEX: 22.43 KG/M2 | SYSTOLIC BLOOD PRESSURE: 136 MMHG | DIASTOLIC BLOOD PRESSURE: 76 MMHG | OXYGEN SATURATION: 95 % | TEMPERATURE: 98 F | HEART RATE: 50 BPM

## 2023-07-11 DIAGNOSIS — I72.2 ANEURYSM OF RENAL ARTERY: ICD-10-CM

## 2023-07-11 DIAGNOSIS — R91.1 SOLITARY PULMONARY NODULE: Primary | ICD-10-CM

## 2023-07-11 PROCEDURE — 3044F PR MOST RECENT HEMOGLOBIN A1C LEVEL <7.0%: ICD-10-PCS | Mod: CPTII,,, | Performed by: FAMILY MEDICINE

## 2023-07-11 PROCEDURE — 99214 PR OFFICE/OUTPT VISIT, EST, LEVL IV, 30-39 MIN: ICD-10-PCS | Mod: ,,, | Performed by: FAMILY MEDICINE

## 2023-07-11 PROCEDURE — 4010F PR ACE/ARB THEARPY RXD/TAKEN: ICD-10-PCS | Mod: CPTII,,, | Performed by: FAMILY MEDICINE

## 2023-07-11 PROCEDURE — 99214 OFFICE O/P EST MOD 30 MIN: CPT | Mod: ,,, | Performed by: FAMILY MEDICINE

## 2023-07-11 PROCEDURE — 1159F PR MEDICATION LIST DOCUMENTED IN MEDICAL RECORD: ICD-10-PCS | Mod: CPTII,,, | Performed by: FAMILY MEDICINE

## 2023-07-11 PROCEDURE — 3288F PR FALLS RISK ASSESSMENT DOCUMENTED: ICD-10-PCS | Mod: CPTII,,, | Performed by: FAMILY MEDICINE

## 2023-07-11 PROCEDURE — 4010F ACE/ARB THERAPY RXD/TAKEN: CPT | Mod: CPTII,,, | Performed by: FAMILY MEDICINE

## 2023-07-11 PROCEDURE — 1101F PR PT FALLS ASSESS DOC 0-1 FALLS W/OUT INJ PAST YR: ICD-10-PCS | Mod: CPTII,,, | Performed by: FAMILY MEDICINE

## 2023-07-11 PROCEDURE — 3078F PR MOST RECENT DIASTOLIC BLOOD PRESSURE < 80 MM HG: ICD-10-PCS | Mod: CPTII,,, | Performed by: FAMILY MEDICINE

## 2023-07-11 PROCEDURE — 1101F PT FALLS ASSESS-DOCD LE1/YR: CPT | Mod: CPTII,,, | Performed by: FAMILY MEDICINE

## 2023-07-11 PROCEDURE — 3078F DIAST BP <80 MM HG: CPT | Mod: CPTII,,, | Performed by: FAMILY MEDICINE

## 2023-07-11 PROCEDURE — 3008F BODY MASS INDEX DOCD: CPT | Mod: CPTII,,, | Performed by: FAMILY MEDICINE

## 2023-07-11 PROCEDURE — 1159F MED LIST DOCD IN RCRD: CPT | Mod: CPTII,,, | Performed by: FAMILY MEDICINE

## 2023-07-11 PROCEDURE — 3061F NEG MICROALBUMINURIA REV: CPT | Mod: CPTII,,, | Performed by: FAMILY MEDICINE

## 2023-07-11 PROCEDURE — 3066F NEPHROPATHY DOC TX: CPT | Mod: CPTII,,, | Performed by: FAMILY MEDICINE

## 2023-07-11 PROCEDURE — 1126F PR PAIN SEVERITY QUANTIFIED, NO PAIN PRESENT: ICD-10-PCS | Mod: CPTII,,, | Performed by: FAMILY MEDICINE

## 2023-07-11 PROCEDURE — 3075F PR MOST RECENT SYSTOLIC BLOOD PRESS GE 130-139MM HG: ICD-10-PCS | Mod: CPTII,,, | Performed by: FAMILY MEDICINE

## 2023-07-11 PROCEDURE — 1126F AMNT PAIN NOTED NONE PRSNT: CPT | Mod: CPTII,,, | Performed by: FAMILY MEDICINE

## 2023-07-11 PROCEDURE — 3061F PR NEG MICROALBUMINURIA RESULT DOCUMENTED/REVIEW: ICD-10-PCS | Mod: CPTII,,, | Performed by: FAMILY MEDICINE

## 2023-07-11 PROCEDURE — 3044F HG A1C LEVEL LT 7.0%: CPT | Mod: CPTII,,, | Performed by: FAMILY MEDICINE

## 2023-07-11 PROCEDURE — 3075F SYST BP GE 130 - 139MM HG: CPT | Mod: CPTII,,, | Performed by: FAMILY MEDICINE

## 2023-07-11 PROCEDURE — 3008F PR BODY MASS INDEX (BMI) DOCUMENTED: ICD-10-PCS | Mod: CPTII,,, | Performed by: FAMILY MEDICINE

## 2023-07-11 PROCEDURE — 3066F PR DOCUMENTATION OF TREATMENT FOR NEPHROPATHY: ICD-10-PCS | Mod: CPTII,,, | Performed by: FAMILY MEDICINE

## 2023-07-11 PROCEDURE — 3288F FALL RISK ASSESSMENT DOCD: CPT | Mod: CPTII,,, | Performed by: FAMILY MEDICINE

## 2023-07-11 NOTE — PROGRESS NOTES
Subjective:       Patient ID: Jonathan Hinton is a 73 y.o. female.    Chief Complaint: Follow-up (Labs, ct, xray)    Ms. Hinton is a 73-year-old female who presents today to follow-up on her recent labs, chest x-ray and CT chest.  Her labs were done on 06/28/2023 and have been reviewed the results are as follows:      1. CBC within normal limits  2. CMP-within normal limits  3. Hemoglobin A1c of 5.1%  4. TSH  0.585  5. Microalbumin/creatinine ratio within normal limits    Chest x-ray done on 06/28/2023 was remarkable for a right pulmonary nodule of approximately 6 mm found in the upper lobe as well as atherosclerosis of the aorta    CT chest showed multiple pulmonary nodules including a 6 mm nodule in the right upper lobe, a 3 mm mm nodule in the right lobe, as well as a 4 mm nodule in the left lower lobe.  Atherosclerotic calcifications are seen as well as a 19 mm calcified structure that may represent a renal mass or renal artery aneurism.    A previous CT of the abdomen and pelvis from 2019 was also reviewed which revealed diverticulosis of the sigmoid colon with mild acute diverticulitis.  Also noted was a renal artery aneurism and a CTA was recommended at that time, however was not done.  This patient may have been lost to follow-up, so we will schedule the CTA of the abdomen and pelvis to evaluate for any other possible aneurysms    Review of Systems   Constitutional:  Negative for diaphoresis and fever.   HENT:  Negative for congestion, ear pain, postnasal drip, rhinorrhea and sore throat.    Eyes:  Negative for discharge.   Respiratory:  Negative for cough and shortness of breath.    Cardiovascular:  Negative for chest pain, palpitations and leg swelling.   Gastrointestinal:  Negative for abdominal distention, abdominal pain, constipation, diarrhea and nausea.   Genitourinary:  Negative for difficulty urinating, dysuria, frequency and urgency.   Musculoskeletal:  Positive for arthralgias, back pain and myalgias.         Chronic lower extremity pain likely from peripheral vascular disease   Skin:  Negative for color change, rash and wound.   All other systems reviewed and are negative.    Patient Active Problem List   Diagnosis    Postmenopausal    Hyperglycemia    Hyperlipidemia    Hypovitaminosis D    Nodular thyroid disease    Essential hypertension    Osteopenia    Generalized osteoarthritis    Claudication    Abnormal finding on mammography    Abdominal pain    Aneurysm of renal artery    Disorder of bone and articular cartilage    Stricture of sigmoid colon    Secondary osteoarthritis of right shoulder due to rotator cuff arthropathy       Objective:      Physical Exam  Vitals and nursing note reviewed.   Constitutional:       Appearance: Normal appearance. She is normal weight.   HENT:      Head: Normocephalic.      Nose: Nose normal.   Eyes:      Extraocular Movements: Extraocular movements intact.      Conjunctiva/sclera: Conjunctivae normal.      Pupils: Pupils are equal, round, and reactive to light.   Pulmonary:      Effort: Pulmonary effort is normal.   Musculoskeletal:         General: Normal range of motion.      Cervical back: Normal range of motion and neck supple.   Skin:     General: Skin is warm and dry.   Neurological:      General: No focal deficit present.      Mental Status: She is alert and oriented to person, place, and time.   Psychiatric:         Mood and Affect: Mood normal.         Behavior: Behavior normal.       Lab Results   Component Value Date    WBC 3.99 06/28/2023    HGB 13.1 06/28/2023    HCT 39.4 06/28/2023     06/28/2023    CHOL 191 06/28/2023    TRIG 79 06/28/2023    HDL 56 06/28/2023    ALT 16 06/28/2023    AST 17 06/28/2023     06/28/2023    K 3.6 06/28/2023     06/28/2023    CREATININE 0.8 06/28/2023    BUN 18 06/28/2023    CO2 26 06/28/2023    TSH 0.585 06/28/2023    HGBA1C 5.1 06/28/2023     The 10-year ASCVD risk score (Joanna SIMS, et al., 2019) is: 14.1%     "Values used to calculate the score:      Age: 73 years      Sex: Female      Is Non- : Yes      Diabetic: No      Tobacco smoker: No      Systolic Blood Pressure: 136 mmHg      Is BP treated: Yes      HDL Cholesterol: 56 mg/dL      Total Cholesterol: 191 mg/dL  Visit Vitals  /76 (BP Location: Right arm, Patient Position: Sitting, BP Method: Medium (Manual))   Pulse (!) 50   Temp 98.2 °F (36.8 °C) (Oral)   Resp 17   Ht 5' 1" (1.549 m)   Wt 53.9 kg (118 lb 13.3 oz)   SpO2 95%   BMI 22.45 kg/m²      Assessment:       1. Solitary pulmonary nodule    2. Aneurysm of renal artery        Plan:       1. Multiple pulmonary nodules         -follow-up CT of the chest is scheduled for 09/28/2023      2. Aneurysm of renal artery  -     CTA Abdomen and Pelvis; Future; Expected date: 07/11/2023       Follow up in about 3 months (around 10/11/2023), or if symptoms worsen or fail to improve.      Future Appointments       Date Provider Specialty Appt Notes    11/7/2023  Lab s    11/14/2023 [facility] Endocrinology 6 month f/u             "

## 2023-07-20 ENCOUNTER — HOSPITAL ENCOUNTER (OUTPATIENT)
Dept: RADIOLOGY | Facility: HOSPITAL | Age: 73
Discharge: HOME | End: 2023-07-20
Attending: FAMILY MEDICINE
Payer: MEDICARE

## 2023-07-20 DIAGNOSIS — I72.2 ANEURYSM OF RENAL ARTERY: ICD-10-CM

## 2023-07-20 PROCEDURE — 74174 CTA ABD&PLVS W/CONTRAST: CPT | Mod: TC

## 2023-07-20 PROCEDURE — 25500020 PHARM REV CODE 255

## 2023-07-20 PROCEDURE — 74174 CTA ABD&PLVS W/CONTRAST: CPT | Mod: 26,,, | Performed by: RADIOLOGY

## 2023-07-20 PROCEDURE — 74174 CTA ABDOMEN AND PELVIS: ICD-10-PCS | Mod: 26,,, | Performed by: RADIOLOGY

## 2023-07-20 RX ADMIN — IOHEXOL 75 ML: 350 INJECTION, SOLUTION INTRAVENOUS at 12:07

## 2023-08-08 ENCOUNTER — TELEPHONE (OUTPATIENT)
Dept: FAMILY MEDICINE | Facility: CLINIC | Age: 73
End: 2023-08-08
Payer: MEDICARE

## 2023-08-08 NOTE — TELEPHONE ENCOUNTER
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  No hydronephrosis is seen.  There is heavy atherosclerotic calcification of the infrarenal abdominal aorta without aneurysm or stenosis.    If she which like to discuss in person I will be happy to go over this CT in person

## 2023-08-09 ENCOUNTER — OFFICE VISIT (OUTPATIENT)
Dept: FAMILY MEDICINE | Facility: CLINIC | Age: 73
End: 2023-08-09
Payer: MEDICARE

## 2023-08-09 VITALS
HEART RATE: 57 BPM | HEIGHT: 61 IN | OXYGEN SATURATION: 98 % | TEMPERATURE: 99 F | WEIGHT: 119.25 LBS | BODY MASS INDEX: 22.51 KG/M2 | SYSTOLIC BLOOD PRESSURE: 172 MMHG | DIASTOLIC BLOOD PRESSURE: 80 MMHG

## 2023-08-09 DIAGNOSIS — I72.2 ANEURYSM OF RENAL ARTERY: ICD-10-CM

## 2023-08-09 DIAGNOSIS — I10 ESSENTIAL HYPERTENSION: ICD-10-CM

## 2023-08-09 DIAGNOSIS — E78.2 MIXED HYPERLIPIDEMIA: ICD-10-CM

## 2023-08-09 DIAGNOSIS — I70.0 AORTIC ATHEROSCLEROSIS: ICD-10-CM

## 2023-08-09 DIAGNOSIS — I72.2 RENAL ARTERY ANEURYSM: Primary | ICD-10-CM

## 2023-08-09 PROCEDURE — 1101F PT FALLS ASSESS-DOCD LE1/YR: CPT | Mod: CPTII,,,

## 2023-08-09 PROCEDURE — 3079F PR MOST RECENT DIASTOLIC BLOOD PRESSURE 80-89 MM HG: ICD-10-PCS | Mod: CPTII,,,

## 2023-08-09 PROCEDURE — 3288F PR FALLS RISK ASSESSMENT DOCUMENTED: ICD-10-PCS | Mod: CPTII,,,

## 2023-08-09 PROCEDURE — 4010F PR ACE/ARB THEARPY RXD/TAKEN: ICD-10-PCS | Mod: CPTII,,,

## 2023-08-09 PROCEDURE — 3008F BODY MASS INDEX DOCD: CPT | Mod: CPTII,,,

## 2023-08-09 PROCEDURE — 3044F HG A1C LEVEL LT 7.0%: CPT | Mod: CPTII,,,

## 2023-08-09 PROCEDURE — 3066F PR DOCUMENTATION OF TREATMENT FOR NEPHROPATHY: ICD-10-PCS | Mod: CPTII,,,

## 2023-08-09 PROCEDURE — 3288F FALL RISK ASSESSMENT DOCD: CPT | Mod: CPTII,,,

## 2023-08-09 PROCEDURE — 1159F PR MEDICATION LIST DOCUMENTED IN MEDICAL RECORD: ICD-10-PCS | Mod: CPTII,,,

## 2023-08-09 PROCEDURE — 99214 PR OFFICE/OUTPT VISIT, EST, LEVL IV, 30-39 MIN: ICD-10-PCS | Mod: ,,,

## 2023-08-09 PROCEDURE — 1160F PR REVIEW ALL MEDS BY PRESCRIBER/CLIN PHARMACIST DOCUMENTED: ICD-10-PCS | Mod: CPTII,,,

## 2023-08-09 PROCEDURE — 1126F PR PAIN SEVERITY QUANTIFIED, NO PAIN PRESENT: ICD-10-PCS | Mod: CPTII,,,

## 2023-08-09 PROCEDURE — 3066F NEPHROPATHY DOC TX: CPT | Mod: CPTII,,,

## 2023-08-09 PROCEDURE — 3061F NEG MICROALBUMINURIA REV: CPT | Mod: CPTII,,,

## 2023-08-09 PROCEDURE — 1159F MED LIST DOCD IN RCRD: CPT | Mod: CPTII,,,

## 2023-08-09 PROCEDURE — 3061F PR NEG MICROALBUMINURIA RESULT DOCUMENTED/REVIEW: ICD-10-PCS | Mod: CPTII,,,

## 2023-08-09 PROCEDURE — 3008F PR BODY MASS INDEX (BMI) DOCUMENTED: ICD-10-PCS | Mod: CPTII,,,

## 2023-08-09 PROCEDURE — 1126F AMNT PAIN NOTED NONE PRSNT: CPT | Mod: CPTII,,,

## 2023-08-09 PROCEDURE — 3079F DIAST BP 80-89 MM HG: CPT | Mod: CPTII,,,

## 2023-08-09 PROCEDURE — 99214 OFFICE O/P EST MOD 30 MIN: CPT | Mod: ,,,

## 2023-08-09 PROCEDURE — 1160F RVW MEDS BY RX/DR IN RCRD: CPT | Mod: CPTII,,,

## 2023-08-09 PROCEDURE — 1101F PR PT FALLS ASSESS DOC 0-1 FALLS W/OUT INJ PAST YR: ICD-10-PCS | Mod: CPTII,,,

## 2023-08-09 PROCEDURE — 3077F SYST BP >= 140 MM HG: CPT | Mod: CPTII,,,

## 2023-08-09 PROCEDURE — 4010F ACE/ARB THERAPY RXD/TAKEN: CPT | Mod: CPTII,,,

## 2023-08-09 PROCEDURE — 3044F PR MOST RECENT HEMOGLOBIN A1C LEVEL <7.0%: ICD-10-PCS | Mod: CPTII,,,

## 2023-08-09 PROCEDURE — 3077F PR MOST RECENT SYSTOLIC BLOOD PRESSURE >= 140 MM HG: ICD-10-PCS | Mod: CPTII,,,

## 2023-08-09 NOTE — PROGRESS NOTES
Subjective:       Patient ID: Jonathan Hinton is a 73 y.o. female.    Chief Complaint: Follow-up (C.t. scan)    Patient presents to the clinic for a follow up for CT scan results.     EXAMINATION:  CTA ABDOMEN AND PELVIS     CLINICAL HISTORY:  Aneurysm, renal or visceral;  Aneurysm of renal artery     TECHNIQUE:  Axial images are obtained through the abdomen and pelvis during the IV administration of 75 cc Omnipaque 350 IV contrast.  Multiplanar reformatted images are then provided.     COMPARISON:  None     FINDINGS:  The abdominal aorta throughout is of normal caliber.  There is heavy atherosclerotic calcification of the infrarenal abdominal aorta leading to the bifurcation without severe stenosis.  Yue aortic hemorrhage or fluid collection is not seen.  Mural thrombus or aneurysm is not noted.     There is good flow in the celiac trunk and in the superior mesenteric artery without significant atherosclerotic plaque or calcification.  There is good flow in the left renal artery without stenosis.  On the right there is good flow proximally into the renal artery leading to the renal hilum.  In the renal hilum however there is an irregular aneurysm with atherosclerotic calcification of the wall.  This measures 1.9 cm.  Similar finding is not seen on the left.     An inferior mesenteric artery is not seen.  There is good flow in the common iliac arteries internal and external iliac arteries in the pelvis.     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  Hydronephrosis is not seen.  Heavy atherosclerotic calcification of the infrarenal abdominal aorta without aneurysm or stenosis seen.      Discussed lab results. CC with Dr. Claire who ordered the CT scan. She recommends a vascular referral.     She states she has not had her BP meds today.     Patient educated on plan of care, verbalized understanding.        Review of Systems   Constitutional:  Negative for activity change, appetite change,  chills, diaphoresis and fever.   HENT:  Negative for congestion, ear pain, postnasal drip, sinus pressure, sneezing and sore throat.    Eyes:  Negative for pain, discharge, redness and itching.   Respiratory:  Negative for apnea, cough, chest tightness, shortness of breath and wheezing.    Cardiovascular:  Negative for chest pain and leg swelling.   Gastrointestinal:  Negative for abdominal distention, abdominal pain, constipation, diarrhea, nausea and vomiting.   Genitourinary:  Negative for difficulty urinating, dysuria, flank pain and frequency.   Skin:  Negative for color change, rash and wound.   Neurological:  Negative for dizziness.   All other systems reviewed and are negative.      Patient Active Problem List   Diagnosis    Postmenopausal    Hyperglycemia    Hyperlipidemia    Hypovitaminosis D    Nodular thyroid disease    Essential hypertension    Osteopenia    Generalized osteoarthritis    Claudication    Abnormal finding on mammography    Abdominal pain    Aneurysm of renal artery    Disorder of bone and articular cartilage    Stricture of sigmoid colon    Secondary osteoarthritis of right shoulder due to rotator cuff arthropathy       Objective:      Physical Exam  Vitals and nursing note reviewed.   Constitutional:       General: She is not in acute distress.     Appearance: Normal appearance. She is well-developed.   HENT:      Head: Normocephalic.      Nose: Nose normal.   Eyes:      Conjunctiva/sclera: Conjunctivae normal.      Pupils: Pupils are equal, round, and reactive to light.   Cardiovascular:      Rate and Rhythm: Normal rate.   Pulmonary:      Effort: Pulmonary effort is normal. No respiratory distress.   Musculoskeletal:      Cervical back: Normal range of motion.   Skin:     General: Skin is warm and dry.      Findings: No rash.   Neurological:      Mental Status: She is alert and oriented to person, place, and time.   Psychiatric:         Behavior: Behavior normal.         Lab Results  "  Component Value Date    WBC 3.99 06/28/2023    HGB 13.1 06/28/2023    HCT 39.4 06/28/2023     06/28/2023    CHOL 191 06/28/2023    TRIG 79 06/28/2023    HDL 56 06/28/2023    ALT 16 06/28/2023    AST 17 06/28/2023     06/28/2023    K 3.6 06/28/2023     06/28/2023    CREATININE 0.8 06/28/2023    BUN 18 06/28/2023    CO2 26 06/28/2023    TSH 0.585 06/28/2023    HGBA1C 5.1 06/28/2023     The 10-year ASCVD risk score (Joanna SIMS, et al., 2019) is: 20.7%    Values used to calculate the score:      Age: 73 years      Sex: Female      Is Non- : Yes      Diabetic: No      Tobacco smoker: No      Systolic Blood Pressure: 172 mmHg      Is BP treated: Yes      HDL Cholesterol: 56 mg/dL      Total Cholesterol: 191 mg/dL  Visit Vitals  BP (!) 172/80 (BP Location: Left arm, Patient Position: Sitting, BP Method: Medium (Manual))   Pulse (!) 57   Temp 98.8 °F (37.1 °C) (Temporal)   Ht 5' 1" (1.549 m)   Wt 54.1 kg (119 lb 4.3 oz)   SpO2 98%   BMI 22.54 kg/m²      Assessment:       1. Renal artery aneurysm    2. Aortic atherosclerosis    3. Essential hypertension    4. Aneurysm of renal artery        Plan:       1. Renal artery aneurysm/Aneurysm of renal artery  -     Ambulatory referral/consult to Vascular Surgery; Future; Expected date: 08/16/2023    2. Aortic atherosclerosis  -     Ambulatory referral/consult to Vascular Surgery; Future; Expected date: 08/16/2023    3. Essential hypertension   - 2 week BP check   - Tight BP control   - Continue Lisinopril/HCTZ    4. Mixed hyperlipidemia   - Stable-Continue Zocor   - Low fat, low cholesterol diet   - Continue current plan of care       Follow up if symptoms worsen or fail to improve.      Future Appointments       Date Provider Specialty Appt Notes    11/7/2023  Lab s    11/14/2023 DEEPA Centeno PA-C Endocrinology 6 month f/u             "

## 2023-08-10 ENCOUNTER — TELEPHONE (OUTPATIENT)
Dept: VASCULAR SURGERY | Facility: CLINIC | Age: 73
End: 2023-08-10
Payer: MEDICARE

## 2023-08-10 NOTE — PATIENT INSTRUCTIONS

## 2023-08-10 NOTE — TELEPHONE ENCOUNTER
----- Message from Val Clark sent at 8/10/2023 12:28 PM CDT -----  Contact: Self  Type:  Sooner Appointment Request    Caller is requesting a sooner appointment.  Caller declined first available appointment listed below.  Caller will not accept being placed on the waitlist and is requesting a message be sent to doctor.    Name of Caller:  Patient  When is the first available appointment?  N/A  Symptoms:  Renal artery aneurysm/ I70.0 (ICD-10-CM) - Aortic atherosclerosis  (referral in chart)  Best Call Back Number:  [phone]  Additional Information:  Please call back to advise. Thank You

## 2023-08-29 DIAGNOSIS — I10 ESSENTIAL HYPERTENSION: ICD-10-CM

## 2023-08-30 ENCOUNTER — OFFICE VISIT (OUTPATIENT)
Dept: VASCULAR SURGERY | Facility: CLINIC | Age: 73
End: 2023-08-30
Payer: MEDICARE

## 2023-08-30 VITALS
DIASTOLIC BLOOD PRESSURE: 82 MMHG | WEIGHT: 119 LBS | HEART RATE: 68 BPM | BODY MASS INDEX: 22.48 KG/M2 | SYSTOLIC BLOOD PRESSURE: 148 MMHG

## 2023-08-30 DIAGNOSIS — I72.2 ANEURYSM OF RENAL ARTERY: Primary | ICD-10-CM

## 2023-08-30 PROCEDURE — 99203 PR OFFICE/OUTPT VISIT, NEW, LEVL III, 30-44 MIN: ICD-10-PCS | Mod: S$GLB,,, | Performed by: THORACIC SURGERY (CARDIOTHORACIC VASCULAR SURGERY)

## 2023-08-30 PROCEDURE — 99203 OFFICE O/P NEW LOW 30 MIN: CPT | Mod: S$GLB,,, | Performed by: THORACIC SURGERY (CARDIOTHORACIC VASCULAR SURGERY)

## 2023-08-30 PROCEDURE — 3079F PR MOST RECENT DIASTOLIC BLOOD PRESSURE 80-89 MM HG: ICD-10-PCS | Mod: CPTII,S$GLB,, | Performed by: THORACIC SURGERY (CARDIOTHORACIC VASCULAR SURGERY)

## 2023-08-30 PROCEDURE — 3061F PR NEG MICROALBUMINURIA RESULT DOCUMENTED/REVIEW: ICD-10-PCS | Mod: CPTII,S$GLB,, | Performed by: THORACIC SURGERY (CARDIOTHORACIC VASCULAR SURGERY)

## 2023-08-30 PROCEDURE — 3066F PR DOCUMENTATION OF TREATMENT FOR NEPHROPATHY: ICD-10-PCS | Mod: CPTII,S$GLB,, | Performed by: THORACIC SURGERY (CARDIOTHORACIC VASCULAR SURGERY)

## 2023-08-30 PROCEDURE — 1160F PR REVIEW ALL MEDS BY PRESCRIBER/CLIN PHARMACIST DOCUMENTED: ICD-10-PCS | Mod: CPTII,S$GLB,, | Performed by: THORACIC SURGERY (CARDIOTHORACIC VASCULAR SURGERY)

## 2023-08-30 PROCEDURE — 99999 PR PBB SHADOW E&M-EST. PATIENT-LVL III: ICD-10-PCS | Mod: PBBFAC,,, | Performed by: THORACIC SURGERY (CARDIOTHORACIC VASCULAR SURGERY)

## 2023-08-30 PROCEDURE — 1159F MED LIST DOCD IN RCRD: CPT | Mod: CPTII,S$GLB,, | Performed by: THORACIC SURGERY (CARDIOTHORACIC VASCULAR SURGERY)

## 2023-08-30 PROCEDURE — 4010F PR ACE/ARB THEARPY RXD/TAKEN: ICD-10-PCS | Mod: CPTII,S$GLB,, | Performed by: THORACIC SURGERY (CARDIOTHORACIC VASCULAR SURGERY)

## 2023-08-30 PROCEDURE — 3288F PR FALLS RISK ASSESSMENT DOCUMENTED: ICD-10-PCS | Mod: CPTII,S$GLB,, | Performed by: THORACIC SURGERY (CARDIOTHORACIC VASCULAR SURGERY)

## 2023-08-30 PROCEDURE — 3008F BODY MASS INDEX DOCD: CPT | Mod: CPTII,S$GLB,, | Performed by: THORACIC SURGERY (CARDIOTHORACIC VASCULAR SURGERY)

## 2023-08-30 PROCEDURE — 1159F PR MEDICATION LIST DOCUMENTED IN MEDICAL RECORD: ICD-10-PCS | Mod: CPTII,S$GLB,, | Performed by: THORACIC SURGERY (CARDIOTHORACIC VASCULAR SURGERY)

## 2023-08-30 PROCEDURE — 1126F PR PAIN SEVERITY QUANTIFIED, NO PAIN PRESENT: ICD-10-PCS | Mod: CPTII,S$GLB,, | Performed by: THORACIC SURGERY (CARDIOTHORACIC VASCULAR SURGERY)

## 2023-08-30 PROCEDURE — 3066F NEPHROPATHY DOC TX: CPT | Mod: CPTII,S$GLB,, | Performed by: THORACIC SURGERY (CARDIOTHORACIC VASCULAR SURGERY)

## 2023-08-30 PROCEDURE — 3061F NEG MICROALBUMINURIA REV: CPT | Mod: CPTII,S$GLB,, | Performed by: THORACIC SURGERY (CARDIOTHORACIC VASCULAR SURGERY)

## 2023-08-30 PROCEDURE — 3044F HG A1C LEVEL LT 7.0%: CPT | Mod: CPTII,S$GLB,, | Performed by: THORACIC SURGERY (CARDIOTHORACIC VASCULAR SURGERY)

## 2023-08-30 PROCEDURE — 1101F PT FALLS ASSESS-DOCD LE1/YR: CPT | Mod: CPTII,S$GLB,, | Performed by: THORACIC SURGERY (CARDIOTHORACIC VASCULAR SURGERY)

## 2023-08-30 PROCEDURE — 3077F SYST BP >= 140 MM HG: CPT | Mod: CPTII,S$GLB,, | Performed by: THORACIC SURGERY (CARDIOTHORACIC VASCULAR SURGERY)

## 2023-08-30 PROCEDURE — 1126F AMNT PAIN NOTED NONE PRSNT: CPT | Mod: CPTII,S$GLB,, | Performed by: THORACIC SURGERY (CARDIOTHORACIC VASCULAR SURGERY)

## 2023-08-30 PROCEDURE — 3044F PR MOST RECENT HEMOGLOBIN A1C LEVEL <7.0%: ICD-10-PCS | Mod: CPTII,S$GLB,, | Performed by: THORACIC SURGERY (CARDIOTHORACIC VASCULAR SURGERY)

## 2023-08-30 PROCEDURE — 1160F RVW MEDS BY RX/DR IN RCRD: CPT | Mod: CPTII,S$GLB,, | Performed by: THORACIC SURGERY (CARDIOTHORACIC VASCULAR SURGERY)

## 2023-08-30 PROCEDURE — 4010F ACE/ARB THERAPY RXD/TAKEN: CPT | Mod: CPTII,S$GLB,, | Performed by: THORACIC SURGERY (CARDIOTHORACIC VASCULAR SURGERY)

## 2023-08-30 PROCEDURE — 3008F PR BODY MASS INDEX (BMI) DOCUMENTED: ICD-10-PCS | Mod: CPTII,S$GLB,, | Performed by: THORACIC SURGERY (CARDIOTHORACIC VASCULAR SURGERY)

## 2023-08-30 PROCEDURE — 3079F DIAST BP 80-89 MM HG: CPT | Mod: CPTII,S$GLB,, | Performed by: THORACIC SURGERY (CARDIOTHORACIC VASCULAR SURGERY)

## 2023-08-30 PROCEDURE — 3288F FALL RISK ASSESSMENT DOCD: CPT | Mod: CPTII,S$GLB,, | Performed by: THORACIC SURGERY (CARDIOTHORACIC VASCULAR SURGERY)

## 2023-08-30 PROCEDURE — 1101F PR PT FALLS ASSESS DOC 0-1 FALLS W/OUT INJ PAST YR: ICD-10-PCS | Mod: CPTII,S$GLB,, | Performed by: THORACIC SURGERY (CARDIOTHORACIC VASCULAR SURGERY)

## 2023-08-30 PROCEDURE — 3077F PR MOST RECENT SYSTOLIC BLOOD PRESSURE >= 140 MM HG: ICD-10-PCS | Mod: CPTII,S$GLB,, | Performed by: THORACIC SURGERY (CARDIOTHORACIC VASCULAR SURGERY)

## 2023-08-30 PROCEDURE — 99999 PR PBB SHADOW E&M-EST. PATIENT-LVL III: CPT | Mod: PBBFAC,,, | Performed by: THORACIC SURGERY (CARDIOTHORACIC VASCULAR SURGERY)

## 2023-08-30 RX ORDER — LISINOPRIL AND HYDROCHLOROTHIAZIDE 10; 12.5 MG/1; MG/1
1 TABLET ORAL
Qty: 90 TABLET | Refills: 3 | Status: SHIPPED | OUTPATIENT
Start: 2023-08-30

## 2023-08-30 NOTE — PROGRESS NOTES
This patient is 73 years old and was referred with a renal artery aneurysm approximally 1.9 cm in greatest transverse dimension.  She is totally asymptomatic.  This was found on CT scan.  She had recent colonoscopy which she states was reasonably normal.  She has had no major recent surgeries.  She is not a smoker.  She denies any other major medical conditions.  She has no shortness of breath or chest pain.  On exam vital signs are stable.  Pupils are equal and round reactive to light.  Neck is supple.  Chest is equal breath sounds.  Heart is in a regular rate and rhythm.  Abdomen is benign.  Perfusion to the legs and feet seems to be satisfactory.    The recent studies were reviewed and she has a relatively small right renal artery aneurysm at 1.9 cm in greatest transverse dimension.  She is asymptomatic.    Recommendation is  for medical management.    She should have a repeat renal artery ultrasound in 4-6 months.

## 2023-08-30 NOTE — TELEPHONE ENCOUNTER
LOV:8/9/23 Julián    NOV: None     Preffered Pharmacy:[facility] - [place], [place] - [address]

## 2023-08-31 DIAGNOSIS — I72.2 ANEURYSM OF RENAL ARTERY: Primary | ICD-10-CM

## 2023-11-07 ENCOUNTER — LAB VISIT (OUTPATIENT)
Dept: LAB | Facility: HOSPITAL | Age: 73
End: 2023-11-07
Attending: PHYSICIAN ASSISTANT
Payer: MEDICARE

## 2023-11-07 DIAGNOSIS — E53.8 VITAMIN B 12 DEFICIENCY: ICD-10-CM

## 2023-11-07 DIAGNOSIS — E89.0 POST-SURGICAL HYPOTHYROIDISM: ICD-10-CM

## 2023-11-07 DIAGNOSIS — E55.9 HYPOVITAMINOSIS D: ICD-10-CM

## 2023-11-07 LAB
25(OH)D3+25(OH)D2 SERPL-MCNC: 54 NG/ML (ref 30–96)
ALBUMIN SERPL BCP-MCNC: 4.1 G/DL (ref 3.5–5.2)
ALP SERPL-CCNC: 40 U/L (ref 55–135)
ALT SERPL W/O P-5'-P-CCNC: 14 U/L (ref 10–44)
ANION GAP SERPL CALC-SCNC: 10 MMOL/L (ref 8–16)
AST SERPL-CCNC: 15 U/L (ref 10–40)
BASOPHILS # BLD AUTO: 0.03 K/UL (ref 0–0.2)
BASOPHILS NFR BLD: 0.9 % (ref 0–1.9)
BILIRUB SERPL-MCNC: 0.5 MG/DL (ref 0.1–1)
BUN SERPL-MCNC: 18 MG/DL (ref 8–23)
CALCIUM SERPL-MCNC: 10.2 MG/DL (ref 8.7–10.5)
CHLORIDE SERPL-SCNC: 105 MMOL/L (ref 95–110)
CHOLEST SERPL-MCNC: 230 MG/DL (ref 120–199)
CHOLEST/HDLC SERPL: 3.2 {RATIO} (ref 2–5)
CO2 SERPL-SCNC: 28 MMOL/L (ref 23–29)
CREAT SERPL-MCNC: 0.8 MG/DL (ref 0.5–1.4)
DIFFERENTIAL METHOD: ABNORMAL
EOSINOPHIL # BLD AUTO: 0.1 K/UL (ref 0–0.5)
EOSINOPHIL NFR BLD: 2.4 % (ref 0–8)
ERYTHROCYTE [DISTWIDTH] IN BLOOD BY AUTOMATED COUNT: 11.7 % (ref 11.5–14.5)
EST. GFR  (NO RACE VARIABLE): >60 ML/MIN/1.73 M^2
GLUCOSE SERPL-MCNC: 84 MG/DL (ref 70–110)
HCT VFR BLD AUTO: 41.7 % (ref 37–48.5)
HDLC SERPL-MCNC: 71 MG/DL (ref 40–75)
HDLC SERPL: 30.9 % (ref 20–50)
HGB BLD-MCNC: 13.9 G/DL (ref 12–16)
IMM GRANULOCYTES # BLD AUTO: 0.01 K/UL (ref 0–0.04)
IMM GRANULOCYTES NFR BLD AUTO: 0.3 % (ref 0–0.5)
LDLC SERPL CALC-MCNC: 143.2 MG/DL (ref 63–159)
LYMPHOCYTES # BLD AUTO: 1.5 K/UL (ref 1–4.8)
LYMPHOCYTES NFR BLD: 44.8 % (ref 18–48)
MCH RBC QN AUTO: 33.4 PG (ref 27–31)
MCHC RBC AUTO-ENTMCNC: 33.3 G/DL (ref 32–36)
MCV RBC AUTO: 100 FL (ref 82–98)
MONOCYTES # BLD AUTO: 0.4 K/UL (ref 0.3–1)
MONOCYTES NFR BLD: 10.7 % (ref 4–15)
NEUTROPHILS # BLD AUTO: 1.3 K/UL (ref 1.8–7.7)
NEUTROPHILS NFR BLD: 40.9 % (ref 38–73)
NONHDLC SERPL-MCNC: 159 MG/DL
NRBC BLD-RTO: 0 /100 WBC
PLATELET # BLD AUTO: 171 K/UL (ref 150–450)
PMV BLD AUTO: 10.5 FL (ref 9.2–12.9)
POTASSIUM SERPL-SCNC: 3.9 MMOL/L (ref 3.5–5.1)
PROT SERPL-MCNC: 8 G/DL (ref 6–8.4)
RBC # BLD AUTO: 4.16 M/UL (ref 4–5.4)
SODIUM SERPL-SCNC: 143 MMOL/L (ref 136–145)
T4 FREE SERPL-MCNC: 1.03 NG/DL (ref 0.71–1.51)
TRIGL SERPL-MCNC: 79 MG/DL (ref 30–150)
TSH SERPL DL<=0.005 MIU/L-ACNC: 2.53 UIU/ML (ref 0.4–4)
VIT B12 SERPL-MCNC: 1476 PG/ML (ref 210–950)
WBC # BLD AUTO: 3.28 K/UL (ref 3.9–12.7)

## 2023-11-07 PROCEDURE — 80061 LIPID PANEL: CPT | Performed by: PHYSICIAN ASSISTANT

## 2023-11-07 PROCEDURE — 84443 ASSAY THYROID STIM HORMONE: CPT | Performed by: PHYSICIAN ASSISTANT

## 2023-11-07 PROCEDURE — 80053 COMPREHEN METABOLIC PANEL: CPT | Performed by: PHYSICIAN ASSISTANT

## 2023-11-07 PROCEDURE — 82607 VITAMIN B-12: CPT | Performed by: PHYSICIAN ASSISTANT

## 2023-11-07 PROCEDURE — 84439 ASSAY OF FREE THYROXINE: CPT | Performed by: PHYSICIAN ASSISTANT

## 2023-11-07 PROCEDURE — 36415 COLL VENOUS BLD VENIPUNCTURE: CPT | Mod: PO | Performed by: PHYSICIAN ASSISTANT

## 2023-11-07 PROCEDURE — 85025 COMPLETE CBC W/AUTO DIFF WBC: CPT | Performed by: PHYSICIAN ASSISTANT

## 2023-11-07 PROCEDURE — 82306 VITAMIN D 25 HYDROXY: CPT | Performed by: PHYSICIAN ASSISTANT

## 2023-11-20 ENCOUNTER — OFFICE VISIT (OUTPATIENT)
Dept: ENDOCRINOLOGY | Facility: CLINIC | Age: 73
End: 2023-11-20
Payer: MEDICARE

## 2023-11-20 VITALS
BODY MASS INDEX: 22.48 KG/M2 | DIASTOLIC BLOOD PRESSURE: 80 MMHG | SYSTOLIC BLOOD PRESSURE: 110 MMHG | HEART RATE: 55 BPM | OXYGEN SATURATION: 99 % | WEIGHT: 119.06 LBS | HEIGHT: 61 IN | TEMPERATURE: 98 F

## 2023-11-20 DIAGNOSIS — M81.0 AGE-RELATED OSTEOPOROSIS WITHOUT CURRENT PATHOLOGICAL FRACTURE: ICD-10-CM

## 2023-11-20 DIAGNOSIS — E78.5 HYPERLIPIDEMIA, UNSPECIFIED HYPERLIPIDEMIA TYPE: ICD-10-CM

## 2023-11-20 DIAGNOSIS — E55.9 HYPOVITAMINOSIS D: ICD-10-CM

## 2023-11-20 DIAGNOSIS — E89.0 POST-SURGICAL HYPOTHYROIDISM: Primary | ICD-10-CM

## 2023-11-20 DIAGNOSIS — I10 HYPERTENSION, UNSPECIFIED TYPE: ICD-10-CM

## 2023-11-20 DIAGNOSIS — E53.8 VITAMIN B 12 DEFICIENCY: ICD-10-CM

## 2023-11-20 DIAGNOSIS — Z78.0 POSTMENOPAUSAL: ICD-10-CM

## 2023-11-20 PROCEDURE — 3066F PR DOCUMENTATION OF TREATMENT FOR NEPHROPATHY: ICD-10-PCS | Mod: CPTII,S$GLB,, | Performed by: PHYSICIAN ASSISTANT

## 2023-11-20 PROCEDURE — 1160F RVW MEDS BY RX/DR IN RCRD: CPT | Mod: CPTII,S$GLB,, | Performed by: PHYSICIAN ASSISTANT

## 2023-11-20 PROCEDURE — 1101F PR PT FALLS ASSESS DOC 0-1 FALLS W/OUT INJ PAST YR: ICD-10-PCS | Mod: CPTII,S$GLB,, | Performed by: PHYSICIAN ASSISTANT

## 2023-11-20 PROCEDURE — 3288F PR FALLS RISK ASSESSMENT DOCUMENTED: ICD-10-PCS | Mod: CPTII,S$GLB,, | Performed by: PHYSICIAN ASSISTANT

## 2023-11-20 PROCEDURE — 1159F MED LIST DOCD IN RCRD: CPT | Mod: CPTII,S$GLB,, | Performed by: PHYSICIAN ASSISTANT

## 2023-11-20 PROCEDURE — 1159F PR MEDICATION LIST DOCUMENTED IN MEDICAL RECORD: ICD-10-PCS | Mod: CPTII,S$GLB,, | Performed by: PHYSICIAN ASSISTANT

## 2023-11-20 PROCEDURE — 1101F PT FALLS ASSESS-DOCD LE1/YR: CPT | Mod: CPTII,S$GLB,, | Performed by: PHYSICIAN ASSISTANT

## 2023-11-20 PROCEDURE — 3061F PR NEG MICROALBUMINURIA RESULT DOCUMENTED/REVIEW: ICD-10-PCS | Mod: CPTII,S$GLB,, | Performed by: PHYSICIAN ASSISTANT

## 2023-11-20 PROCEDURE — 3044F HG A1C LEVEL LT 7.0%: CPT | Mod: CPTII,S$GLB,, | Performed by: PHYSICIAN ASSISTANT

## 2023-11-20 PROCEDURE — 3008F BODY MASS INDEX DOCD: CPT | Mod: CPTII,S$GLB,, | Performed by: PHYSICIAN ASSISTANT

## 2023-11-20 PROCEDURE — 3074F PR MOST RECENT SYSTOLIC BLOOD PRESSURE < 130 MM HG: ICD-10-PCS | Mod: CPTII,S$GLB,, | Performed by: PHYSICIAN ASSISTANT

## 2023-11-20 PROCEDURE — 3079F PR MOST RECENT DIASTOLIC BLOOD PRESSURE 80-89 MM HG: ICD-10-PCS | Mod: CPTII,S$GLB,, | Performed by: PHYSICIAN ASSISTANT

## 2023-11-20 PROCEDURE — 3044F PR MOST RECENT HEMOGLOBIN A1C LEVEL <7.0%: ICD-10-PCS | Mod: CPTII,S$GLB,, | Performed by: PHYSICIAN ASSISTANT

## 2023-11-20 PROCEDURE — 1160F PR REVIEW ALL MEDS BY PRESCRIBER/CLIN PHARMACIST DOCUMENTED: ICD-10-PCS | Mod: CPTII,S$GLB,, | Performed by: PHYSICIAN ASSISTANT

## 2023-11-20 PROCEDURE — 3079F DIAST BP 80-89 MM HG: CPT | Mod: CPTII,S$GLB,, | Performed by: PHYSICIAN ASSISTANT

## 2023-11-20 PROCEDURE — 1126F AMNT PAIN NOTED NONE PRSNT: CPT | Mod: CPTII,S$GLB,, | Performed by: PHYSICIAN ASSISTANT

## 2023-11-20 PROCEDURE — 99999 PR PBB SHADOW E&M-EST. PATIENT-LVL III: CPT | Mod: PBBFAC,,, | Performed by: PHYSICIAN ASSISTANT

## 2023-11-20 PROCEDURE — 4010F ACE/ARB THERAPY RXD/TAKEN: CPT | Mod: CPTII,S$GLB,, | Performed by: PHYSICIAN ASSISTANT

## 2023-11-20 PROCEDURE — 3074F SYST BP LT 130 MM HG: CPT | Mod: CPTII,S$GLB,, | Performed by: PHYSICIAN ASSISTANT

## 2023-11-20 PROCEDURE — 99213 OFFICE O/P EST LOW 20 MIN: CPT | Mod: S$GLB,,, | Performed by: PHYSICIAN ASSISTANT

## 2023-11-20 PROCEDURE — 4010F PR ACE/ARB THEARPY RXD/TAKEN: ICD-10-PCS | Mod: CPTII,S$GLB,, | Performed by: PHYSICIAN ASSISTANT

## 2023-11-20 PROCEDURE — 3066F NEPHROPATHY DOC TX: CPT | Mod: CPTII,S$GLB,, | Performed by: PHYSICIAN ASSISTANT

## 2023-11-20 PROCEDURE — 3288F FALL RISK ASSESSMENT DOCD: CPT | Mod: CPTII,S$GLB,, | Performed by: PHYSICIAN ASSISTANT

## 2023-11-20 PROCEDURE — 1126F PR PAIN SEVERITY QUANTIFIED, NO PAIN PRESENT: ICD-10-PCS | Mod: CPTII,S$GLB,, | Performed by: PHYSICIAN ASSISTANT

## 2023-11-20 PROCEDURE — 3061F NEG MICROALBUMINURIA REV: CPT | Mod: CPTII,S$GLB,, | Performed by: PHYSICIAN ASSISTANT

## 2023-11-20 PROCEDURE — 99999 PR PBB SHADOW E&M-EST. PATIENT-LVL III: ICD-10-PCS | Mod: PBBFAC,,, | Performed by: PHYSICIAN ASSISTANT

## 2023-11-20 PROCEDURE — 99213 PR OFFICE/OUTPT VISIT, EST, LEVL III, 20-29 MIN: ICD-10-PCS | Mod: S$GLB,,, | Performed by: PHYSICIAN ASSISTANT

## 2023-11-20 PROCEDURE — 3008F PR BODY MASS INDEX (BMI) DOCUMENTED: ICD-10-PCS | Mod: CPTII,S$GLB,, | Performed by: PHYSICIAN ASSISTANT

## 2023-11-20 NOTE — PROGRESS NOTES
CC: Post surgical hypothyroidism    HPI: Jonathan Hinton is a 73 y.o. female here for hypothyroidism along with pending conditions listed in the Visit Diagnosis.She had a thyroidectomy w/ Dr. Cannon in 7/22 for Graves' Disease. +FHx of thyroid disease in one sister and two nieces. Her dad, brother and three uncles have DM. No hx of neck radiation.  She had multiple thyroid nodules. Path was benign.     PMHx, PSHx: reviewed in epic.    Social Hx: no ETOH/tobacco use. She smoked > ten years ~1 pack per day. She quit in 5/18 on her own. She worked at [employer].    Taking Levothyroxine 50 mcg daily.   + hair loss, anxiety, wt gain, constipation  No palpitations, hair loss, fatigue, diarrhea.     Eye exam: 10/21- cataracts  + tearing and grittiness in eyes.     Last DEXA 1/23 shows osteoporosis. Taking fosamax since 1/23. No falls this year. No steroid injections. She is taking vitamin d and calcium. She runs 3x times weekly. She walks for 45 min. Taking ca and vd. She is doing stretching exercises.     No SOB,  Dysphagia. States her voice is deeper.     Wt Readings from Last 6 Encounters:   11/20/23 54 kg (119 lb 0.8 oz)   08/30/23 54 kg (119 lb)   08/09/23 54.1 kg (119 lb 4.3 oz)   07/11/23 53.9 kg (118 lb 13.3 oz)   06/27/23 53.9 kg (118 lb 13.3 oz)   05/05/23 52.3 kg (115 lb 6.6 oz)      ROS:   Constitutional: energy stable, wt loss (4 lbs).   Eyes: + blurry vision, denies double vision.   Cardiovascular: Denies current anginal symptoms  Respiratory: Denies current respiratory difficulty  Gastrointestinal: Denies recent bowel disturbances  GenitoUrinary - No dysuria  Skin: No new skin rash  Musc: legs and arms pain, knee pain  Neurologic:+numbness and tingling in hands, + headaches.    Endocrine: no polyphagia, polydipsia, polyuria  Remainder ROS negative     Personally reviewed imaging and labs below:    Lab Results   Component Value Date    TSH 2.529 11/07/2023    O5PNONL 93 05/01/2020    FREET4 1.03 11/07/2023         Chemistry        Component Value Date/Time     11/07/2023 0812    K 3.9 11/07/2023 0812     11/07/2023 0812    CO2 28 11/07/2023 0812    BUN 18 11/07/2023 0812    CREATININE 0.8 11/07/2023 0812    GLU 84 11/07/2023 0812        Component Value Date/Time    CALCIUM 10.2 11/07/2023 0812    ALKPHOS 40 (L) 11/07/2023 0812    AST 15 11/07/2023 0812    ALT 14 11/07/2023 0812    BILITOT 0.5 11/07/2023 0812    ESTGFRAFRICA >60 07/14/2022 1805    EGFRNONAA >60 07/14/2022 1805         Lab Results   Component Value Date    HGBA1C 5.1 06/28/2023    HGBA1C 5.3 05/10/2018      DEXA BONE DENSITY SPINE HIP     CLINICAL HISTORY:  Other specified disorders of bone density and structure, unspecified site     TECHNIQUE:  DXA scanning was performed over the left hip and lumbar spine.  Review of the images confirms satisfactory positioning and technique.     COMPARISON:  None     FINDINGS:  The L1 to L4 vertebral bone mineral density is equal to 1.045 g/cm squared with a T score of -1.0.  There has been 5.8% increase relative to the prior study.     The left femoral neck bone mineral density is equal to 0.572 g/cm squared with a T score of -2.7.  There has been  20.3% decrease relative to the prior study.     There is a 9.8% risk of a major osteoporotic fracture and a 4.0% risk of hip fracture in the next 10 years (FRAX).     Impression:     Osteoporosis with A T-score of -2.7       PE:  GENERAL: Elderly female, Well hydrated. NAD.  RESPIRATORY: Normal effort,   Psych: appropriate mood and affect  NEURO:CN ll-Xll intact, steady gait    Assessment/Plan:   1. Post-surgical hypothyroidism  CBC Auto Differential    Comprehensive Metabolic Panel    T4, Free    TSH      2. Age-related osteoporosis without current pathological fracture        3. Hyperlipidemia, unspecified hyperlipidemia type        4. Hypertension, unspecified type        5. Hypovitaminosis D  Vitamin D      6. Vitamin B 12 deficiency  VITAMIN B12      7.  Postmenopausal  DXA Bone Density Axial Skeleton 1 or more sites        Post-surgical hypothyroidism-TSH is wnl. Continue 50 mcg qd.   Osteoporosis-continue ca and vitamin D. Continue weight bearing exercises. Repeat DEXA scan 1/25.  Qldwwjnbqnndws-icaghp-ivzdbcln zocor  Hypertension-stable-Continue meds.  Hypovitaminosis F-rabiro-nphoelxf vd.  Vitamin b 12 deficiency-stable-monitor.      F/u in 1/2025  w/ labs and dexa

## 2023-11-24 ENCOUNTER — HOSPITAL ENCOUNTER (OUTPATIENT)
Dept: RADIOLOGY | Facility: HOSPITAL | Age: 73
Discharge: HOME | End: 2023-11-24
Attending: FAMILY MEDICINE
Payer: MEDICARE

## 2023-11-24 DIAGNOSIS — I72.2 RENAL ARTERY ANEURYSM: ICD-10-CM

## 2023-11-24 DIAGNOSIS — R10.31 RIGHT LOWER QUADRANT ABDOMINAL PAIN: ICD-10-CM

## 2023-11-24 DIAGNOSIS — N28.89 RENAL MASS, RIGHT: ICD-10-CM

## 2023-11-24 DIAGNOSIS — R91.1 SOLITARY PULMONARY NODULE: ICD-10-CM

## 2023-11-24 PROCEDURE — 71250 CT THORAX DX C-: CPT | Mod: TC

## 2023-11-24 PROCEDURE — 74178 CT ABD&PLV WO CNTR FLWD CNTR: CPT | Mod: TC

## 2023-11-24 PROCEDURE — 74178 CT ABDOMEN PELVIS W WO CONTRAST: ICD-10-PCS | Mod: 26,,, | Performed by: RADIOLOGY

## 2023-11-24 PROCEDURE — 71250 CT CHEST WITHOUT CONTRAST: ICD-10-PCS | Mod: 26,,, | Performed by: RADIOLOGY

## 2023-11-24 PROCEDURE — 74178 CT ABD&PLV WO CNTR FLWD CNTR: CPT | Mod: 26,,, | Performed by: RADIOLOGY

## 2023-11-24 PROCEDURE — 71250 CT THORAX DX C-: CPT | Mod: 26,,, | Performed by: RADIOLOGY

## 2023-11-27 DIAGNOSIS — R91.1 SOLITARY PULMONARY NODULE: Primary | ICD-10-CM

## 2023-12-01 ENCOUNTER — TELEPHONE (OUTPATIENT)
Dept: FAMILY MEDICINE | Facility: CLINIC | Age: 73
End: 2023-12-01
Payer: MEDICARE

## 2024-01-18 ENCOUNTER — OFFICE VISIT (OUTPATIENT)
Dept: FAMILY MEDICINE | Facility: CLINIC | Age: 74
End: 2024-01-18
Payer: MEDICARE

## 2024-01-18 VITALS
RESPIRATION RATE: 16 BRPM | WEIGHT: 120.81 LBS | SYSTOLIC BLOOD PRESSURE: 148 MMHG | DIASTOLIC BLOOD PRESSURE: 82 MMHG | BODY MASS INDEX: 22.81 KG/M2 | OXYGEN SATURATION: 96 % | HEIGHT: 61 IN | HEART RATE: 50 BPM

## 2024-01-18 DIAGNOSIS — I10 ESSENTIAL HYPERTENSION: ICD-10-CM

## 2024-01-18 DIAGNOSIS — Z00.00 ANNUAL PHYSICAL EXAM: Primary | ICD-10-CM

## 2024-01-18 DIAGNOSIS — E78.5 HYPERLIPIDEMIA, UNSPECIFIED HYPERLIPIDEMIA TYPE: ICD-10-CM

## 2024-01-18 DIAGNOSIS — Z78.0 POSTMENOPAUSAL: ICD-10-CM

## 2024-01-18 PROCEDURE — 1126F AMNT PAIN NOTED NONE PRSNT: CPT | Mod: CPTII,S$GLB,, | Performed by: FAMILY MEDICINE

## 2024-01-18 PROCEDURE — 3077F SYST BP >= 140 MM HG: CPT | Mod: CPTII,S$GLB,, | Performed by: FAMILY MEDICINE

## 2024-01-18 PROCEDURE — 3288F FALL RISK ASSESSMENT DOCD: CPT | Mod: CPTII,S$GLB,, | Performed by: FAMILY MEDICINE

## 2024-01-18 PROCEDURE — 1101F PT FALLS ASSESS-DOCD LE1/YR: CPT | Mod: CPTII,S$GLB,, | Performed by: FAMILY MEDICINE

## 2024-01-18 PROCEDURE — 1159F MED LIST DOCD IN RCRD: CPT | Mod: CPTII,S$GLB,, | Performed by: FAMILY MEDICINE

## 2024-01-18 PROCEDURE — 99397 PER PM REEVAL EST PAT 65+ YR: CPT | Mod: S$GLB,,, | Performed by: FAMILY MEDICINE

## 2024-01-18 PROCEDURE — 3079F DIAST BP 80-89 MM HG: CPT | Mod: CPTII,S$GLB,, | Performed by: FAMILY MEDICINE

## 2024-01-18 PROCEDURE — 1160F RVW MEDS BY RX/DR IN RCRD: CPT | Mod: CPTII,S$GLB,, | Performed by: FAMILY MEDICINE

## 2024-01-18 PROCEDURE — 3008F BODY MASS INDEX DOCD: CPT | Mod: CPTII,S$GLB,, | Performed by: FAMILY MEDICINE

## 2024-01-18 PROCEDURE — 99999 PR PBB SHADOW E&M-EST. PATIENT-LVL III: CPT | Mod: PBBFAC,,, | Performed by: FAMILY MEDICINE

## 2024-01-18 RX ORDER — SIMVASTATIN 40 MG/1
40 TABLET, FILM COATED ORAL NIGHTLY
Qty: 90 TABLET | Refills: 3 | Status: SHIPPED | OUTPATIENT
Start: 2024-01-18

## 2024-01-18 NOTE — PROGRESS NOTES
Subjective:       Patient ID: Jonathan Hinton is a 73 y.o. female.    Chief Complaint: Annual Exam    Jonathan Hinton presents to the clinic today for 6 month follow up. Patient states she has been doing well but has had increased stress recently.   Patient educated on plan of care, verbalized understanding.         Review of Systems   Constitutional:  Negative for activity change, appetite change, chills, diaphoresis and fever.   HENT:  Negative for congestion, ear pain, postnasal drip, sinus pressure, sneezing and sore throat.    Eyes:  Negative for pain, discharge, redness and itching.   Respiratory:  Negative for apnea, cough, chest tightness, shortness of breath and wheezing.    Cardiovascular:  Negative for chest pain and leg swelling.   Gastrointestinal:  Negative for abdominal distention, abdominal pain, constipation, diarrhea, nausea and vomiting.   Genitourinary:  Negative for difficulty urinating, dysuria, flank pain and frequency.   Skin:  Negative for color change, rash and wound.   Neurological:  Negative for dizziness.       Patient Active Problem List   Diagnosis    Postmenopausal    Hyperglycemia    Hyperlipidemia    Hypovitaminosis D    Nodular thyroid disease    Essential hypertension    Osteopenia    Generalized osteoarthritis    Claudication    Abnormal finding on mammography    Abdominal pain    Aneurysm of renal artery    Disorder of bone and articular cartilage    Stricture of sigmoid colon    Secondary osteoarthritis of right shoulder due to rotator cuff arthropathy       Objective:      Physical Exam  Vitals reviewed.   Constitutional:       General: She is not in acute distress.     Appearance: Normal appearance. She is well-developed.   HENT:      Head: Normocephalic.      Nose: Nose normal.   Eyes:      Conjunctiva/sclera: Conjunctivae normal.      Pupils: Pupils are equal, round, and reactive to light.   Cardiovascular:      Rate and Rhythm: Normal rate and regular rhythm.      Heart  "sounds: Normal heart sounds.   Pulmonary:      Effort: Pulmonary effort is normal. No respiratory distress.      Breath sounds: Normal breath sounds.   Musculoskeletal:      Cervical back: Normal range of motion and neck supple.   Skin:     General: Skin is warm and dry.      Findings: No rash.   Neurological:      Mental Status: She is alert and oriented to person, place, and time.   Psychiatric:         Behavior: Behavior normal.         Lab Results   Component Value Date    WBC 3.28 (L) 11/07/2023    HGB 13.9 11/07/2023    HCT 41.7 11/07/2023     11/07/2023    CHOL 230 (H) 11/07/2023    TRIG 79 11/07/2023    HDL 71 11/07/2023    ALT 14 11/07/2023    AST 15 11/07/2023     11/07/2023    K 3.9 11/07/2023     11/07/2023    CREATININE 0.8 11/07/2023    BUN 18 11/07/2023    CO2 28 11/07/2023    TSH 2.529 11/07/2023    HGBA1C 5.1 06/28/2023     The 10-year ASCVD risk score (Joanna SIMS, et al., 2019) is: 19.9%    Values used to calculate the score:      Age: 73 years      Sex: Female      Is Non- : Yes      Diabetic: No      Tobacco smoker: No      Systolic Blood Pressure: 148 mmHg      Is BP treated: Yes      HDL Cholesterol: 71 mg/dL      Total Cholesterol: 230 mg/dL  Visit Vitals  BP (!) 148/82 (BP Location: Right arm, Patient Position: Sitting, BP Method: Medium (Manual))   Pulse (!) 50   Resp 16   Ht 5' 1" (1.549 m)   Wt 54.8 kg (120 lb 13 oz)   SpO2 96%   BMI 22.83 kg/m²      Assessment:       1. Annual physical exam    2. Hyperlipidemia, unspecified hyperlipidemia type    3. BMI 22.0-22.9, adult    4. Essential hypertension    5. Postmenopausal        Plan:       Jonathan was seen today for annual exam.    Diagnoses and all orders for this visit:    Annual physical exam    Hyperlipidemia, unspecified hyperlipidemia type  -     simvastatin (ZOCOR) 40 MG tablet; Take 1 tablet (40 mg total) by mouth every evening.  Continue medications as prescribed.  Follow up with " PCP    BMI 22.0-22.9, adult  Body mass index is 22.83 kg/m².  Continue healthy diet and regular exercise as tolerated.  Continue medications as prescribed.  Follow up with PCP     Essential hypertension  Stable  Continue medications as prescribed.  Follow up with PCP    Postmenopausal       Follow up in about 6 months (around 7/18/2024), or if symptoms worsen or fail to improve.      Future Appointments       Date Provider Specialty Appt Notes    7/18/2024 Mya Velasquez, NP Family Medicine 6M F/U    1/8/2025  Lab lab    1/15/2025 DEEPA Centeno PA-C Endocrinology year follow up             Tests to Keep You Healthy    Mammogram: Met on 5/9/2023  Colon Cancer Screening: Met on 2/26/2019  Last Blood Pressure <= 139/89 (1/18/2024): NO

## 2024-03-07 DIAGNOSIS — E89.0 POST-SURGICAL HYPOTHYROIDISM: ICD-10-CM

## 2024-03-08 RX ORDER — LEVOTHYROXINE SODIUM 50 UG/1
50 TABLET ORAL
Qty: 90 TABLET | Refills: 3 | Status: SHIPPED | OUTPATIENT
Start: 2024-03-08

## 2024-03-19 DIAGNOSIS — M81.0 OSTEOPOROSIS, UNSPECIFIED OSTEOPOROSIS TYPE, UNSPECIFIED PATHOLOGICAL FRACTURE PRESENCE: ICD-10-CM

## 2024-03-19 RX ORDER — ALENDRONATE SODIUM 70 MG/1
TABLET ORAL
Qty: 12 TABLET | Refills: 3 | Status: SHIPPED | OUTPATIENT
Start: 2024-03-19

## 2024-05-01 DIAGNOSIS — Z12.31 BREAST CANCER SCREENING BY MAMMOGRAM: Primary | ICD-10-CM

## 2024-05-15 ENCOUNTER — TELEPHONE (OUTPATIENT)
Dept: FAMILY MEDICINE | Facility: CLINIC | Age: 74
End: 2024-05-15
Payer: MEDICARE

## 2024-05-15 NOTE — TELEPHONE ENCOUNTER
Mammogram and bone density screening reviewed    Mammogram is in normal limits, repeat in one year.     Normal bone density, no changes needed.

## 2024-07-16 ENCOUNTER — LAB VISIT (OUTPATIENT)
Dept: LAB | Facility: HOSPITAL | Age: 74
End: 2024-07-16
Attending: STUDENT IN AN ORGANIZED HEALTH CARE EDUCATION/TRAINING PROGRAM
Payer: MEDICARE

## 2024-07-16 ENCOUNTER — OFFICE VISIT (OUTPATIENT)
Dept: FAMILY MEDICINE | Facility: CLINIC | Age: 74
End: 2024-07-16
Payer: MEDICARE

## 2024-07-16 VITALS
HEART RATE: 52 BPM | HEIGHT: 61 IN | BODY MASS INDEX: 23.47 KG/M2 | WEIGHT: 124.31 LBS | DIASTOLIC BLOOD PRESSURE: 74 MMHG | SYSTOLIC BLOOD PRESSURE: 138 MMHG | RESPIRATION RATE: 16 BRPM | OXYGEN SATURATION: 98 %

## 2024-07-16 DIAGNOSIS — E55.9 HYPOVITAMINOSIS D: ICD-10-CM

## 2024-07-16 DIAGNOSIS — R94.6 ABNORMAL RESULTS OF THYROID FUNCTION STUDIES: ICD-10-CM

## 2024-07-16 DIAGNOSIS — M62.9 DISORDER OF MUSCLE, UNSPECIFIED: ICD-10-CM

## 2024-07-16 DIAGNOSIS — I73.9 CLAUDICATION: ICD-10-CM

## 2024-07-16 DIAGNOSIS — M79.605 BILATERAL LEG AND FOOT PAIN: ICD-10-CM

## 2024-07-16 DIAGNOSIS — Z00.00 ENCOUNTER FOR ANNUAL GENERAL MEDICAL EXAMINATION WITHOUT ABNORMAL FINDINGS IN ADULT: Primary | ICD-10-CM

## 2024-07-16 DIAGNOSIS — R79.9 ABNORMAL FINDING OF BLOOD CHEMISTRY, UNSPECIFIED: ICD-10-CM

## 2024-07-16 DIAGNOSIS — Z00.00 ENCOUNTER FOR ANNUAL GENERAL MEDICAL EXAMINATION WITHOUT ABNORMAL FINDINGS IN ADULT: ICD-10-CM

## 2024-07-16 DIAGNOSIS — M79.604 BILATERAL LEG AND FOOT PAIN: ICD-10-CM

## 2024-07-16 DIAGNOSIS — M79.671 BILATERAL LEG AND FOOT PAIN: ICD-10-CM

## 2024-07-16 DIAGNOSIS — I10 ESSENTIAL (PRIMARY) HYPERTENSION: ICD-10-CM

## 2024-07-16 DIAGNOSIS — M79.672 BILATERAL LEG AND FOOT PAIN: ICD-10-CM

## 2024-07-16 DIAGNOSIS — I72.2 ANEURYSM OF RENAL ARTERY: ICD-10-CM

## 2024-07-16 DIAGNOSIS — R79.89 OTHER SPECIFIED ABNORMAL FINDINGS OF BLOOD CHEMISTRY: ICD-10-CM

## 2024-07-16 DIAGNOSIS — G57.23 FEMORAL NEUROPATHY OF LOWER EXTREMITY, BILATERAL: ICD-10-CM

## 2024-07-16 LAB
ALBUMIN SERPL BCP-MCNC: 4.1 G/DL (ref 3.5–5.2)
ALP SERPL-CCNC: 35 U/L (ref 55–135)
ALT SERPL W/O P-5'-P-CCNC: 18 U/L (ref 10–44)
ANION GAP SERPL CALC-SCNC: 13 MMOL/L (ref 8–16)
AST SERPL-CCNC: 24 U/L (ref 10–40)
BILIRUB SERPL-MCNC: 0.7 MG/DL (ref 0.1–1)
BUN SERPL-MCNC: 17 MG/DL (ref 8–23)
CALCIUM SERPL-MCNC: 9.9 MG/DL (ref 8.7–10.5)
CHLORIDE SERPL-SCNC: 102 MMOL/L (ref 95–110)
CHOLEST SERPL-MCNC: 261 MG/DL (ref 120–199)
CHOLEST/HDLC SERPL: 3.4 {RATIO} (ref 2–5)
CO2 SERPL-SCNC: 26 MMOL/L (ref 23–29)
CREAT SERPL-MCNC: 1.1 MG/DL (ref 0.5–1.4)
ERYTHROCYTE [DISTWIDTH] IN BLOOD BY AUTOMATED COUNT: 12.3 % (ref 11.5–14.5)
EST. GFR  (NO RACE VARIABLE): 53.1 ML/MIN/1.73 M^2
ESTIMATED AVG GLUCOSE: 108 MG/DL (ref 68–131)
FERRITIN SERPL-MCNC: 63 NG/ML (ref 20–300)
GLUCOSE SERPL-MCNC: 84 MG/DL (ref 70–110)
HBA1C MFR BLD: 5.4 % (ref 4–5.6)
HCT VFR BLD AUTO: 41.5 % (ref 37–48.5)
HDLC SERPL-MCNC: 77 MG/DL (ref 40–75)
HDLC SERPL: 29.5 % (ref 20–50)
HGB BLD-MCNC: 13.6 G/DL (ref 12–16)
IRON SERPL-MCNC: 103 UG/DL (ref 30–160)
LDLC SERPL CALC-MCNC: 161 MG/DL (ref 63–159)
MCH RBC QN AUTO: 33.1 PG (ref 27–31)
MCHC RBC AUTO-ENTMCNC: 32.8 G/DL (ref 32–36)
MCV RBC AUTO: 101 FL (ref 82–98)
NONHDLC SERPL-MCNC: 184 MG/DL
PLATELET # BLD AUTO: 227 K/UL (ref 150–450)
PMV BLD AUTO: 10.5 FL (ref 9.2–12.9)
POTASSIUM SERPL-SCNC: 3.7 MMOL/L (ref 3.5–5.1)
PROT SERPL-MCNC: 8 G/DL (ref 6–8.4)
RBC # BLD AUTO: 4.11 M/UL (ref 4–5.4)
SATURATED IRON: 26 % (ref 20–50)
SODIUM SERPL-SCNC: 141 MMOL/L (ref 136–145)
T4 FREE SERPL-MCNC: 0.51 NG/DL (ref 0.71–1.51)
TOTAL IRON BINDING CAPACITY: 394 UG/DL (ref 250–450)
TRANSFERRIN SERPL-MCNC: 266 MG/DL (ref 200–375)
TRIGL SERPL-MCNC: 115 MG/DL (ref 30–150)
TSH SERPL DL<=0.005 MIU/L-ACNC: 70.24 UIU/ML (ref 0.4–4)
WBC # BLD AUTO: 4.28 K/UL (ref 3.9–12.7)

## 2024-07-16 PROCEDURE — 1159F MED LIST DOCD IN RCRD: CPT | Mod: CPTII,S$GLB,, | Performed by: STUDENT IN AN ORGANIZED HEALTH CARE EDUCATION/TRAINING PROGRAM

## 2024-07-16 PROCEDURE — 80053 COMPREHEN METABOLIC PANEL: CPT | Performed by: STUDENT IN AN ORGANIZED HEALTH CARE EDUCATION/TRAINING PROGRAM

## 2024-07-16 PROCEDURE — 83036 HEMOGLOBIN GLYCOSYLATED A1C: CPT | Performed by: STUDENT IN AN ORGANIZED HEALTH CARE EDUCATION/TRAINING PROGRAM

## 2024-07-16 PROCEDURE — 80061 LIPID PANEL: CPT | Performed by: STUDENT IN AN ORGANIZED HEALTH CARE EDUCATION/TRAINING PROGRAM

## 2024-07-16 PROCEDURE — 1125F AMNT PAIN NOTED PAIN PRSNT: CPT | Mod: CPTII,S$GLB,, | Performed by: STUDENT IN AN ORGANIZED HEALTH CARE EDUCATION/TRAINING PROGRAM

## 2024-07-16 PROCEDURE — 84443 ASSAY THYROID STIM HORMONE: CPT | Performed by: STUDENT IN AN ORGANIZED HEALTH CARE EDUCATION/TRAINING PROGRAM

## 2024-07-16 PROCEDURE — 85027 COMPLETE CBC AUTOMATED: CPT | Performed by: STUDENT IN AN ORGANIZED HEALTH CARE EDUCATION/TRAINING PROGRAM

## 2024-07-16 PROCEDURE — 3008F BODY MASS INDEX DOCD: CPT | Mod: CPTII,S$GLB,, | Performed by: STUDENT IN AN ORGANIZED HEALTH CARE EDUCATION/TRAINING PROGRAM

## 2024-07-16 PROCEDURE — 99214 OFFICE O/P EST MOD 30 MIN: CPT | Mod: S$GLB,,, | Performed by: STUDENT IN AN ORGANIZED HEALTH CARE EDUCATION/TRAINING PROGRAM

## 2024-07-16 PROCEDURE — 1160F RVW MEDS BY RX/DR IN RCRD: CPT | Mod: CPTII,S$GLB,, | Performed by: STUDENT IN AN ORGANIZED HEALTH CARE EDUCATION/TRAINING PROGRAM

## 2024-07-16 PROCEDURE — 82728 ASSAY OF FERRITIN: CPT | Performed by: STUDENT IN AN ORGANIZED HEALTH CARE EDUCATION/TRAINING PROGRAM

## 2024-07-16 PROCEDURE — 82652 VIT D 1 25-DIHYDROXY: CPT | Performed by: STUDENT IN AN ORGANIZED HEALTH CARE EDUCATION/TRAINING PROGRAM

## 2024-07-16 PROCEDURE — 36415 COLL VENOUS BLD VENIPUNCTURE: CPT | Mod: PO | Performed by: STUDENT IN AN ORGANIZED HEALTH CARE EDUCATION/TRAINING PROGRAM

## 2024-07-16 PROCEDURE — 99999 PR PBB SHADOW E&M-EST. PATIENT-LVL III: CPT | Mod: PBBFAC,,, | Performed by: STUDENT IN AN ORGANIZED HEALTH CARE EDUCATION/TRAINING PROGRAM

## 2024-07-16 PROCEDURE — 3075F SYST BP GE 130 - 139MM HG: CPT | Mod: CPTII,S$GLB,, | Performed by: STUDENT IN AN ORGANIZED HEALTH CARE EDUCATION/TRAINING PROGRAM

## 2024-07-16 PROCEDURE — 3078F DIAST BP <80 MM HG: CPT | Mod: CPTII,S$GLB,, | Performed by: STUDENT IN AN ORGANIZED HEALTH CARE EDUCATION/TRAINING PROGRAM

## 2024-07-16 PROCEDURE — 84439 ASSAY OF FREE THYROXINE: CPT | Performed by: STUDENT IN AN ORGANIZED HEALTH CARE EDUCATION/TRAINING PROGRAM

## 2024-07-16 PROCEDURE — 4010F ACE/ARB THERAPY RXD/TAKEN: CPT | Mod: CPTII,S$GLB,, | Performed by: STUDENT IN AN ORGANIZED HEALTH CARE EDUCATION/TRAINING PROGRAM

## 2024-07-16 PROCEDURE — G2211 COMPLEX E/M VISIT ADD ON: HCPCS | Mod: S$GLB,,, | Performed by: STUDENT IN AN ORGANIZED HEALTH CARE EDUCATION/TRAINING PROGRAM

## 2024-07-16 PROCEDURE — 83540 ASSAY OF IRON: CPT | Performed by: STUDENT IN AN ORGANIZED HEALTH CARE EDUCATION/TRAINING PROGRAM

## 2024-07-16 RX ORDER — GABAPENTIN 300 MG/1
300 CAPSULE ORAL NIGHTLY
Qty: 90 CAPSULE | Refills: 3 | Status: SHIPPED | OUTPATIENT
Start: 2024-07-16 | End: 2025-07-16

## 2024-07-16 NOTE — PROGRESS NOTES
SUBJECTIVE:    CHIEF COMPLAINT:   Chief Complaint   Patient presents with    [facility]           274}    HISTORY OF PRESENT ILLNESS:  Jonathan Hinton is a 74 y.o. female hypertension, hyperlipidemia, hypothyroidism and claudication who presents to the clinic today [facility].    She reports she has all medications.  Reports a burning sensation in bilateral legs worsening for the past year.  She has not been evaluated for this concern in the past, denies any recent injury to her back or trauma.      PAST MEDICAL HISTORY:     274}  Past Medical History:   Diagnosis Date    Hyperlipidemia     Hypertension     Hyperthyroidism        PAST SURGICAL HISTORY:  Past Surgical History:   Procedure Laterality Date    COLONOSCOPY      THYROIDECTOMY Bilateral 7/14/2022    Procedure: THYROIDECTOMY;  Surgeon: Rafy Cannon MD;  Location: [facility];  Service: ENT;  Laterality: Bilateral;    TUBAL LIGATION      WISDOM TOOTH EXTRACTION         SOCIAL HISTORY:  Social History     Socioeconomic History    Marital status:    Tobacco Use    Smoking status: Former     Current packs/day: 0.00     Types: Cigarettes     Quit date: 6/30/2021     Years since quitting: 3.0    Smokeless tobacco: Former   Substance and Sexual Activity    Alcohol use: Not Currently    Drug use: Never    Sexual activity: Not Currently       FAMILY HISTORY:       No family history on file.    ALLERGIES AND MEDICATIONS: updated and reviewed.      274}  Review of patient's allergies indicates:  No Known Allergies  Medication List with Changes/Refills   Current Medications    ALENDRONATE (FOSAMAX) 70 MG TABLET    Take 1 tablet by mouth once a week    DICLOFENAC (VOLTAREN) 75 MG EC TABLET    Take 1 tablet (75 mg total) by mouth 2 (two) times daily.    LEVOTHYROXINE (SYNTHROID) 50 MCG TABLET    TAKE 1 TABLET BY MOUTH BEFORE  BREAKFAST    LISINOPRIL-HYDROCHLOROTHIAZIDE (PRINZIDE,ZESTORETIC) 10-12.5 MG PER TABLET    TAKE 1 TABLET BY MOUTH DAILY     "SIMVASTATIN (ZOCOR) 40 MG TABLET    Take 1 tablet (40 mg total) by mouth every evening.   Changed and/or Refilled Medications    Modified Medication Previous Medication    GABAPENTIN (NEURONTIN) 300 MG CAPSULE gabapentin (NEURONTIN) 300 MG capsule       Take 1 capsule (300 mg total) by mouth every evening.    Take 1 capsule (300 mg total) by mouth every evening.       SCREENING HISTORY:    274}  Health Maintenance         Date Due Completion Date    TETANUS VACCINE Never done ---    Shingles Vaccine (1 of 2) Never done ---    RSV Vaccine (Age 60+ and Pregnant patients) (1 - 1-dose 60+ series) Never done ---    Influenza Vaccine (1) 09/01/2024 2/14/2023    Mammogram 05/14/2025 5/14/2024    DEXA Scan 05/14/2027 5/14/2024    Lipid Panel 11/07/2028 11/7/2023    Colorectal Cancer Screening 02/26/2029 2/26/2019            REVIEW OF SYSTEMS:   Review of Systems   All other systems reviewed and are negative.      PHYSICAL EXAM:      274}  /74 (BP Location: Right arm, Patient Position: Sitting, BP Method: Small (Manual))   Pulse (!) 52   Resp 16   Ht 5' 1" (1.549 m)   Wt 56.4 kg (124 lb 5.4 oz)   SpO2 98%   BMI 23.49 kg/m²   Wt Readings from Last 3 Encounters:   07/16/24 56.4 kg (124 lb 5.4 oz)   01/18/24 54.8 kg (120 lb 13 oz)   11/20/23 54 kg (119 lb 0.8 oz)     BP Readings from Last 3 Encounters:   07/16/24 138/74   01/18/24 (!) 148/82   11/20/23 110/80     Estimated body mass index is 23.49 kg/m² as calculated from the following:    Height as of this encounter: 5' 1" (1.549 m).    Weight as of this encounter: 56.4 kg (124 lb 5.4 oz).     Physical Exam  Vitals reviewed.   Constitutional:       General: She is not in acute distress.     Appearance: Normal appearance. She is well-developed and normal weight. She is not ill-appearing.   HENT:      Head: Normocephalic and atraumatic.      Right Ear: External ear normal.      Left Ear: External ear normal.      Nose: Nose normal.   Eyes:      Extraocular Movements: " Extraocular movements intact.      Conjunctiva/sclera: Conjunctivae normal.      Pupils: Pupils are equal, round, and reactive to light.   Neck:      Thyroid: No thyroid mass.   Cardiovascular:      Rate and Rhythm: Normal rate and regular rhythm.      Pulses: Normal pulses.      Heart sounds: Normal heart sounds, S1 normal and S2 normal. No murmur heard.     No gallop.   Pulmonary:      Effort: Pulmonary effort is normal. No respiratory distress.      Breath sounds: Normal breath sounds and air entry. No stridor. No wheezing, rhonchi or rales.   Abdominal:      General: Bowel sounds are normal. There is no distension.      Palpations: Abdomen is soft. There is no mass.      Tenderness: There is no abdominal tenderness.   Musculoskeletal:         General: No swelling or deformity. Normal range of motion.      Cervical back: Normal range of motion and neck supple. No edema or erythema.   Skin:     General: Skin is warm and dry.      Findings: No lesion or rash.   Neurological:      General: No focal deficit present.      Mental Status: She is alert and oriented to person, place, and time. Mental status is at baseline.   Psychiatric:         Mood and Affect: Mood normal.         Behavior: Behavior normal. Behavior is cooperative.         Judgment: Judgment normal.         LABS:   274}  I have reviewed old labs below:  Lab Results   Component Value Date    WBC 3.28 (L) 11/07/2023    HGB 13.9 11/07/2023    HCT 41.7 11/07/2023     (H) 11/07/2023     11/07/2023     11/07/2023    K 3.9 11/07/2023     11/07/2023    CALCIUM 10.2 11/07/2023    PHOS 3.4 07/14/2022    CO2 28 11/07/2023    GLU 84 11/07/2023    BUN 18 11/07/2023    CREATININE 0.8 11/07/2023    ANIONGAP 10 11/07/2023    ESTGFRAFRICA >60 07/14/2022    EGFRNONAA >60 07/14/2022    PROT 8.0 11/07/2023    ALBUMIN 4.1 11/07/2023    BILITOT 0.5 11/07/2023    ALKPHOS 40 (L) 11/07/2023    ALT 14 11/07/2023    AST 15 11/07/2023    CHOL 230 (H)  11/07/2023    TRIG 79 11/07/2023    HDL 71 11/07/2023    LDLCALC 143.2 11/07/2023    TSH 2.529 11/07/2023    HGBA1C 5.1 06/28/2023       ASSESSMENT AND PLAN:  274}  1. Encounter for annual general medical examination without abnormal findings in adult  -     TSH; Future; Expected date: 07/16/2024  -     Lipid Panel; Future; Expected date: 07/16/2024  -     Hemoglobin A1C; Future; Expected date: 07/16/2024  -     Comprehensive Metabolic Panel; Future; Expected date: 07/16/2024  -     CBC Without Differential; Future; Expected date: 07/16/2024  -     Ferritin; Future; Expected date: 07/16/2024  -     T4, FREE; Future; Expected date: 07/16/2024    2. Claudication  Comments:  Worsening bilateral leg pain/burning sensation for the past year. Refilled gabapentin.  Check arterial and venous ultrasounds of lower extremity  Orders:  -     US Lower Extremity Veins Bilateral; Future; Expected date: 07/16/2024  -     US Lower Extrem Arteries Bilat with ETELVINA (xpd); Future; Expected date: 07/16/2024  -     X-Ray Lumbar Spine AP And Lateral; Future; Expected date: 07/16/2024  -     Lipid Panel; Future; Expected date: 07/16/2024  -     IRON AND TIBC; Future; Expected date: 07/16/2024    3. Bilateral leg and foot pain  Comments:  Worsening bilateral leg pain/burning sensation for the past year.  Refilled gabapentin.Check arterial and venous ultrasounds of lower extremity  Orders:  -     US Lower Extremity Veins Bilateral; Future; Expected date: 07/16/2024  -     US Lower Extrem Arteries Bilat with ETELVINA (xpd); Future; Expected date: 07/16/2024  -     X-Ray Lumbar Spine AP And Lateral; Future; Expected date: 07/16/2024  -     Lipid Panel; Future; Expected date: 07/16/2024  -     IRON AND TIBC; Future; Expected date: 07/16/2024    4. Femoral neuropathy of lower extremity, bilateral  Comments:  Worsening bilateral leg pain/burning sensation for the past year. Refilled gabapentin  Orders:  -     gabapentin (NEURONTIN) 300 MG capsule; Take  1 capsule (300 mg total) by mouth every evening.  Dispense: 90 capsule; Refill: 3    5. Aneurysm of renal artery  Comments:  Noted on 11/24/2023 CT abd/pel:right renal artery aneurysm diameter measuring 2 cm unchanged compared to prior CTA of 07/20/2023. repeat in12 mo    6. Hypovitaminosis D  -     Calcitriol; Future; Expected date: 07/16/2024    7. Abnormal results of thyroid function studies  -     TSH; Future; Expected date: 07/16/2024    8. Essential (primary) hypertension  Comments:  Controlled on lisinopril hydrochlorothiazide.  Orders:  -     Lipid Panel; Future; Expected date: 07/16/2024  -     Comprehensive Metabolic Panel; Future; Expected date: 07/16/2024    9. Abnormal finding of blood chemistry, unspecified  -     CBC Without Differential; Future; Expected date: 07/16/2024    10. Other specified abnormal findings of blood chemistry  -     Hemoglobin A1C; Future; Expected date: 07/16/2024  -     IRON AND TIBC; Future; Expected date: 07/16/2024    11. Disorder of muscle, unspecified  -     T4, FREE; Future; Expected date: 07/16/2024  -     IRON AND TIBC; Future; Expected date: 07/16/2024           Orders Placed This Encounter   Procedures    US Lower Extremity Veins Bilateral    US Lower Extrem Arteries Bilat with ETELVINA (xpd)    X-Ray Lumbar Spine AP And Lateral    TSH    Lipid Panel    Hemoglobin A1C    Comprehensive Metabolic Panel    CBC Without Differential    Ferritin    T4, FREE    Calcitriol    IRON AND TIBC       Follow up in about 3 months (around 10/16/2024). or sooner as needed.

## 2024-07-19 LAB — 1,25(OH)2D3 SERPL-MCNC: 73 PG/ML (ref 20–79)

## 2024-07-22 NOTE — PROGRESS NOTES
Please inform patient of the following:    Your labs have been reviewed and there are multiple values that are abnormal.  Your thyroid function is severely abnormal.   Are you taking thyroid medication every morning at least 30 minutes to 1 hour before meals?  Also are you taking cholesterol medication as prescribed as labs suggest that your cholesterol is elevated and you may require treatment for elevated cholesterol levels    I would recommend setting a sooner appointment that we can assist you with your medications and discuss all medications going forward.  Please bring medications with you to the next appointment.       If you have any further questions please contact our office    Sincerely,    Jaja Cain, DO

## 2024-08-02 DIAGNOSIS — I10 ESSENTIAL HYPERTENSION: ICD-10-CM

## 2024-08-04 ENCOUNTER — HOSPITAL ENCOUNTER (OUTPATIENT)
Dept: RADIOLOGY | Facility: HOSPITAL | Age: 74
Discharge: HOME | End: 2024-08-04
Attending: STUDENT IN AN ORGANIZED HEALTH CARE EDUCATION/TRAINING PROGRAM
Payer: MEDICARE

## 2024-08-04 DIAGNOSIS — M79.605 BILATERAL LEG AND FOOT PAIN: ICD-10-CM

## 2024-08-04 DIAGNOSIS — I73.9 CLAUDICATION: ICD-10-CM

## 2024-08-04 DIAGNOSIS — M79.672 BILATERAL LEG AND FOOT PAIN: ICD-10-CM

## 2024-08-04 DIAGNOSIS — M79.671 BILATERAL LEG AND FOOT PAIN: ICD-10-CM

## 2024-08-04 DIAGNOSIS — M79.604 BILATERAL LEG AND FOOT PAIN: ICD-10-CM

## 2024-08-04 PROCEDURE — 72100 X-RAY EXAM L-S SPINE 2/3 VWS: CPT | Mod: TC

## 2024-08-04 PROCEDURE — 93925 LOWER EXTREMITY STUDY: CPT | Mod: TC

## 2024-08-04 PROCEDURE — 93925 LOWER EXTREMITY STUDY: CPT | Mod: 26,,, | Performed by: RADIOLOGY

## 2024-08-04 PROCEDURE — 93922 UPR/L XTREMITY ART 2 LEVELS: CPT | Mod: 26,,, | Performed by: RADIOLOGY

## 2024-08-04 PROCEDURE — 93922 UPR/L XTREMITY ART 2 LEVELS: CPT | Mod: TC

## 2024-08-04 PROCEDURE — 72100 X-RAY EXAM L-S SPINE 2/3 VWS: CPT | Mod: 26,,, | Performed by: RADIOLOGY

## 2024-08-05 DIAGNOSIS — M51.36 DEGENERATIVE DISC DISEASE, LUMBAR: Primary | ICD-10-CM

## 2024-08-05 RX ORDER — LISINOPRIL AND HYDROCHLOROTHIAZIDE 10; 12.5 MG/1; MG/1
1 TABLET ORAL
Qty: 90 TABLET | Refills: 3 | Status: SHIPPED | OUTPATIENT
Start: 2024-08-05

## 2024-08-20 ENCOUNTER — CLINICAL SUPPORT (OUTPATIENT)
Dept: REHABILITATION | Facility: HOSPITAL | Age: 74
End: 2024-08-20
Payer: MEDICARE

## 2024-08-20 ENCOUNTER — OFFICE VISIT (OUTPATIENT)
Dept: SPINE | Facility: CLINIC | Age: 74
End: 2024-08-20
Payer: MEDICARE

## 2024-08-20 VITALS — BODY MASS INDEX: 23.49 KG/M2 | WEIGHT: 124.31 LBS

## 2024-08-20 DIAGNOSIS — Z74.09 IMPAIRED FUNCTIONAL MOBILITY AND ACTIVITY TOLERANCE: Primary | ICD-10-CM

## 2024-08-20 DIAGNOSIS — M51.36 DEGENERATIVE DISC DISEASE, LUMBAR: ICD-10-CM

## 2024-08-20 DIAGNOSIS — G89.29 CHRONIC BILATERAL LOW BACK PAIN WITH BILATERAL SCIATICA: Primary | ICD-10-CM

## 2024-08-20 DIAGNOSIS — M54.41 CHRONIC BILATERAL LOW BACK PAIN WITH BILATERAL SCIATICA: Primary | ICD-10-CM

## 2024-08-20 DIAGNOSIS — M53.86 DECREASED ROM OF LUMBAR SPINE: ICD-10-CM

## 2024-08-20 DIAGNOSIS — G89.29 CHRONIC BILATERAL LOW BACK PAIN WITH BILATERAL SCIATICA: ICD-10-CM

## 2024-08-20 DIAGNOSIS — M54.41 CHRONIC BILATERAL LOW BACK PAIN WITH BILATERAL SCIATICA: ICD-10-CM

## 2024-08-20 DIAGNOSIS — M54.42 CHRONIC BILATERAL LOW BACK PAIN WITH BILATERAL SCIATICA: ICD-10-CM

## 2024-08-20 DIAGNOSIS — M54.42 CHRONIC BILATERAL LOW BACK PAIN WITH BILATERAL SCIATICA: Primary | ICD-10-CM

## 2024-08-20 PROCEDURE — 1160F RVW MEDS BY RX/DR IN RCRD: CPT | Mod: CPTII,S$GLB,, | Performed by: PHYSICAL MEDICINE & REHABILITATION

## 2024-08-20 PROCEDURE — 3288F FALL RISK ASSESSMENT DOCD: CPT | Mod: CPTII,S$GLB,, | Performed by: PHYSICAL MEDICINE & REHABILITATION

## 2024-08-20 PROCEDURE — 1159F MED LIST DOCD IN RCRD: CPT | Mod: CPTII,S$GLB,, | Performed by: PHYSICAL MEDICINE & REHABILITATION

## 2024-08-20 PROCEDURE — 1101F PT FALLS ASSESS-DOCD LE1/YR: CPT | Mod: CPTII,S$GLB,, | Performed by: PHYSICAL MEDICINE & REHABILITATION

## 2024-08-20 PROCEDURE — 97110 THERAPEUTIC EXERCISES: CPT | Mod: PN

## 2024-08-20 PROCEDURE — 4010F ACE/ARB THERAPY RXD/TAKEN: CPT | Mod: CPTII,S$GLB,, | Performed by: PHYSICAL MEDICINE & REHABILITATION

## 2024-08-20 PROCEDURE — 99999 PR PBB SHADOW E&M-EST. PATIENT-LVL III: CPT | Mod: PBBFAC,,, | Performed by: PHYSICAL MEDICINE & REHABILITATION

## 2024-08-20 PROCEDURE — 99204 OFFICE O/P NEW MOD 45 MIN: CPT | Mod: S$GLB,,, | Performed by: PHYSICAL MEDICINE & REHABILITATION

## 2024-08-20 PROCEDURE — 97162 PT EVAL MOD COMPLEX 30 MIN: CPT | Mod: PN

## 2024-08-20 PROCEDURE — 3008F BODY MASS INDEX DOCD: CPT | Mod: CPTII,S$GLB,, | Performed by: PHYSICAL MEDICINE & REHABILITATION

## 2024-08-20 PROCEDURE — 3044F HG A1C LEVEL LT 7.0%: CPT | Mod: CPTII,S$GLB,, | Performed by: PHYSICAL MEDICINE & REHABILITATION

## 2024-08-20 NOTE — PLAN OF CARE
[facility]   Physical Therapy Initial Evaluation      Name: Jonathan Hintno  Clinic Number: [id number]    Therapy Diagnosis:   Encounter Diagnoses   Name Primary?    Impaired functional mobility and activity tolerance Yes    Decreased ROM of lumbar spine     Chronic bilateral low back pain with bilateral sciatica         Physician: Simeon Rosen MD    Physician Orders: PT Eval and Treat Back  Medical Diagnosis from Referral: Chronic bilateral low back pain with bilateral sciatica  Evaluation Date: 8/20/2024  Authorization Period Expiration: 12/31/2024  Plan of Care Expiration: 10/3/2024  Progress Note Due: 9/19/2024  Date of Surgery: N/A  Visit # / Visits authorized: 1/ 1   FOTO: 1/ 3   Next survey due week of 9/3/2024    Precautions: Standard     Time In: 1555  Time Out: 1635  Total Billable Time: 38 minutes    Subjective     Date of onset: 2-3 months ago    History of current condition - Jonathan reports: she has experienced insidious onset B lower back pain (about 2-3 months duration) that has not changed much since onset. Saw primary care provider who had x-rays performed (see results below) and referred her to back and spine clinic.  Saw Dr. Rosen today and has now been referred to PT.     Falls: No    Imaging: X-Ray Lumbar Spine AP And Lateral 8/4/2024: FINDINGS:  Redemonstrated mild chronic height loss involving the superior endplate of L1 with associated small Schmorl's node.  No acute fractures.  Trace anterolisthesis L3 on L4 and broad levocurvature of the lower lumbar spine.  Severe disc height loss L3-L4 and L5-S1.  Lower lumbar predominant facet arthrosis.  Shallow endplate centered osteophytes throughout.  Surgical clips project over the left abdomen and pelvis.  Survey calcified right renal artery aneurysm redemonstrated.    Prior Therapy: Yes   Social History: lives with their family; takes care of my mom and sister  Occupation: Retired  Prior Level of Function:  "Reports no deficits   Current Level of Function: Experiences pain in the morning when getting out of bed, increased pain with initiating walking but improves with continued activity. Increased pain with sitting "for a long time" Increased pain with bending (LE dressing, etc).    Pain:  Current 2/10, worst 7-8/10, best 0/10   Location: bilateral lower back   Description: Intermittent aching  Aggravating Factors: getting out of bed first thing in the morning, prolonged sitting, bending, initiating walking  Easing Factors: Hot bath, analgesic cream.      Patients goals: To straighten it up.      Medical History:   Past Medical History:   Diagnosis Date    Hyperlipidemia     Hypertension     Hyperthyroidism        Surgical History:   Jonathan Hinton  has a past surgical history that includes [place] tooth extraction; Colonoscopy; Tubal ligation; and Thyroidectomy (Bilateral, 7/14/2022).    Medications:   Jonathan has a current medication list which includes the following prescription(s): alendronate, diclofenac, gabapentin, levothyroxine, lisinopril-hydrochlorothiazide, simvastatin, and [DISCONTINUED] methimazole.    Allergies:   Review of patient's allergies indicates:  No Known Allergies     Objective      Posture: Standing: Pelvis level, R shoulder slightly higher than L.  Sitting: reversed lumbar lordosis, increased thoracic kyphosis, moderate rounded shoulder and forward head posture.   Palpation: Tenderness noted L4-5 paraspinals B. Minimally excessive muscle tension B lumbar paraspinals.   Sensation: No deficits reported this date  DTRs: Not assessed this date.   Range of Motion/Strength:   Thoracic/Lumbar AROM: Pain/Dysfunction with Movement:   Flexion                       110* - 65* = 55*    Extension                  20* - 15* = 5*    Right side bending    25*    Left side bending      28*    Right rotation            50%    Left rotation              50%    LE ROM Grossly WNL B  Strength: Grossly 4/5-4+/5 B "     Flexibility: Hamstring length 175* B; moderate piriformis tightness B; minimal to moderate hip flexor tightness B; moderate calf tightness B.   Gait: Without AD  Analysis: No deficits noted this date  Bed Mobility:Independent  Transfers: Independent  Special Tests: Axial compression/distraction (-)  Other: Joint play: posterior to anterior glide of sacrum on noy (-); posterior to anterior glide L5 decreased and uncomfortable, L4-L1 minimally decreased but no discomfort reported.       Intake Outcome Measure for FOTO Lumbar Spine Survey    Therapist reviewed FOTO scores for Jonathan Hinton on 8/20/2024.   FOTO report - see Media section or FOTO account episode details.    Intake Score: 63%    Primary Measure     Score Range     Intake Score     Score Interpretation    Modified Oswestry  100% - 0%          14.0                   Higher Score = Greater Disability    LBP Disability Ques          Treatment     Total Treatment time (time-based codes) separate from Evaluation: 8 minutes     Jonathan received the treatments listed below:      therapeutic exercises to develop flexibility for 8 minutes including:  Supine hip flexor stretch 3x30s ea  Seated piriformis stretch 3x30s ea  Standing calf stretch using wedge 3x30s    Patient Education and Home Exercises     Education provided:   - Educated patient in role of PT and proposed POC.  Initiated instruction in exercise program    Written Home Exercises Provided: yes. Exercises were reviewed and Jonathan was able to demonstrate them prior to the end of the session.  Jonathan demonstrated good  understanding of the education provided. See EMR under Patient Instructions for exercises provided during therapy sessions.    Assessment     Jonathan is a 74 y.o. female referred to outpatient Physical Therapy with a medical diagnosis of Chronic bilateral low back pain with bilateral sciatica. Patient presents with therapeutic diagnoses of impaired functional mobility/activity  tolerance, decreased lumbar ROM.  Additional problems include: impaired posture, decreased LE flexibility, decreased core strength.  These problems contribute to the following limitations:  difficulty with sitting for long periods, bending (worse with sustained bending), difficulty getting out of bed in the morning, and difficulty initiating gait after prolonged sitting.  Jonathan will benefit from skilled PT services to address these problems in order to decrease lower back pain, to improve posture, to increase ROM/core strength, to minimize functional deficits and to maximize activity tolerance.    Patient prognosis is Good.   Patient will benefit from skilled outpatient Physical Therapy to address the deficits stated above and in the chart below, provide patient /family education, and to maximize patientt's level of independence.     Plan of care discussed with patient: Yes  Patient's spiritual, cultural and educational needs considered and patient is agreeable to the plan of care and goals as stated below:     Anticipated Barriers for therapy: None    Medical Necessity is demonstrated by the following  History  Co-morbidities and personal factors that may impact the plan of care [] LOW: no personal factors / co-morbidities  [x] MODERATE: 1-2 personal factors / co-morbidities  [] HIGH: 3+ personal factors / co-morbidities    Moderate / High Support Documentation:   Co-morbidities affecting plan of care: Age, HTN    Personal Factors:   age     Examination  Body Structures and Functions, activity limitations and participation restrictions that may impact the plan of care [] LOW: addressing 1-2 elements  [x] MODERATE: 3+ elements  [] HIGH: 4+ elements (please support below)    Moderate / High Support Documentation: difficulty with prolonged positions, difficulty performing activities requiring bending, pain with initiating gait after prolonged sitting     Clinical Presentation [] LOW: stable  [x] MODERATE:  Evolving  [] HIGH: Unstable     Decision Making/ Complexity Score: moderate       Goals:  Short Term Goals: 3 weeks   1) Decrease maximal pain to < 5/10  2) Decrease tenderness to minimal  3) Patient will bend forward to perform LE dressing without significant increase in lower back pain  4) Patient will sit > 30 minutes without increased lower back pain    Long Term Goals: 6 weeks   1) Facilitate improved LE flexibility  2) Patient will consistently initiate walking after prolonged sitting without increased lower back pain  3) Patient will consistently get out of bed in the morning without increased LBP  4) Improve functional score to > 75% as indicated by FOTO lumbar spine survey  5) Patient will be independent in HEP.     Plan     Plan of care Certification: 8/20/2024 to 10/3/2024.    Outpatient Physical Therapy 2 times weekly for 6 weeks to include the following interventions: Electrical Stimulation Pre-mod, Manual Therapy, Moist Heat/ Ice, Neuromuscular Re-ed, Patient Education, Therapeutic Activities, Therapeutic Exercise, and Therapeutic Taping . Lou[place]ia may be treated by PTA as part of their rehab team.     Maddi Davalos, PT        Physician's Signature: _________________________________________ Date: ________________

## 2024-08-20 NOTE — PROGRESS NOTES
SUBJECTIVE:    Patient ID: Jonathan Hinton is a 74 y.o. female.    Chief Complaint: Back Pain (2 month hx. Aleve helps)    This is a 74-year-old woman who sees Dr. Cain for primary care.  Other than hypertension and hypothyroidism she denies any chronic major medical problems.  No cancer history.  Long history of low back pain at the lumbosacral junction associated with radiating pain down both legs to the anterolateral portion both legs below the knees.  No bowel or bladder dysfunction fever chills sweats or unexpected weight loss.  She has never had any specific treatment for her back.  Takes a leave which helps and she is also on gabapentin.  Pain level is 5/10 and interferes with quality of life in terms of activities of daily living recreation and social activities.  I personally reviewed x-rays of the lumbar spine done 08/04/2024 which show multilevel degenerative disc disease.          Past Medical History:   Diagnosis Date    Hyperlipidemia     Hypertension     Hyperthyroidism      Social History     Socioeconomic History    Marital status:    Tobacco Use    Smoking status: Former     Current packs/day: 0.00     Types: Cigarettes     Quit date: 6/30/2021     Years since quitting: 3.1    Smokeless tobacco: Former   Substance and Sexual Activity    Alcohol use: Not Currently    Drug use: Never    Sexual activity: Not Currently     Past Surgical History:   Procedure Laterality Date    COLONOSCOPY      THYROIDECTOMY Bilateral 7/14/2022    Procedure: THYROIDECTOMY;  Surgeon: Rafy Cannon MD;  [facility];  Service: ENT;  Laterality: Bilateral;    TUBAL LIGATION      WISDOM TOOTH EXTRACTION       No family history on file.  Vitals:    08/20/24 1008   Weight: 56.4 kg (124 lb 5.4 oz)       Review of Systems   Constitutional:  Negative for chills, diaphoresis, fatigue, fever and unexpected weight change.   HENT:  Negative for trouble swallowing.    Eyes:  Negative for visual disturbance.    Respiratory:  Negative for shortness of breath.    Cardiovascular:  Negative for chest pain.   Gastrointestinal:  Negative for abdominal pain, constipation, nausea and vomiting.   Genitourinary:  Negative for difficulty urinating.   Musculoskeletal:  Negative for arthralgias, back pain, gait problem, joint swelling, myalgias, neck pain and neck stiffness.   Neurological:  Negative for dizziness, speech difficulty, weakness, light-headedness, numbness and headaches.          Objective:      Physical Exam  Neurological:      Mental Status: She is alert and oriented to person, place, and time.      Comments: She is awake and in no acute distress  No point tenderness or palpable masses about the lumbar spine  Forward flexion is to about 45° before she complains of pain at the lumbosacral junction  Extension causes no pain  Reflexes- +1-+2 reflexes at the following:   C5-Biceps   C6-Brachioradialis   C7-Triceps   L3/4-Patellar   S1-Achilles   Tita sign is negative bilaterally  Strength testing- 5/5 strength in the following muscle groups:  C5-Elbow flexion  C6-Wrist extension  C7-Elbow extension  C8-Finger flexion  T1-Finger abduction  L2-Hip flexion  L3-Knee extension  L4-Ankle dorsiflexion  L5-Great toe extension  S1-Ankle plantar flexion       Straight leg raise negative bilaterally             Assessment:       1. Chronic bilateral low back pain with bilateral sciatica    2. Degenerative disc disease, lumbar           Plan:     She has a nonfocal neurological examination and no historical red flags.  I suspect she has chronic low back pain on basis of degenerative disc disease.  She has symptoms of lumbar radiculitis with no clear evidence of nerve root dysfunction.  I think she can be treated conservatively.  I am recommending physical therapy.  Continue naproxen and gabapentin.  Follow up with me at the completion therapy.      Chronic bilateral low back pain with bilateral sciatica  -     Ambulatory  referral/consult to Physical/Occupational Therapy; Future; Expected date: 08/27/2024    Degenerative disc disease, lumbar  -     Ambulatory referral/consult to Back & Spine Clinic

## 2024-08-21 ENCOUNTER — CLINICAL SUPPORT (OUTPATIENT)
Dept: REHABILITATION | Facility: HOSPITAL | Age: 74
End: 2024-08-21
Payer: MEDICARE

## 2024-08-21 DIAGNOSIS — G89.29 CHRONIC BILATERAL LOW BACK PAIN WITH BILATERAL SCIATICA: ICD-10-CM

## 2024-08-21 DIAGNOSIS — M54.41 CHRONIC BILATERAL LOW BACK PAIN WITH BILATERAL SCIATICA: ICD-10-CM

## 2024-08-21 DIAGNOSIS — M53.86 DECREASED ROM OF LUMBAR SPINE: ICD-10-CM

## 2024-08-21 DIAGNOSIS — Z74.09 IMPAIRED FUNCTIONAL MOBILITY AND ACTIVITY TOLERANCE: Primary | ICD-10-CM

## 2024-08-21 DIAGNOSIS — M54.42 CHRONIC BILATERAL LOW BACK PAIN WITH BILATERAL SCIATICA: ICD-10-CM

## 2024-08-21 PROCEDURE — 97140 MANUAL THERAPY 1/> REGIONS: CPT | Mod: PN,CQ

## 2024-08-21 PROCEDURE — 97110 THERAPEUTIC EXERCISES: CPT | Mod: PN,CQ

## 2024-08-21 NOTE — PROGRESS NOTES
"[facility]   Physical Therapy Treatment Note     Name: Jonathan Hinton  Clinic Number: [id number]    Therapy Diagnosis:   Encounter Diagnoses   Name Primary?    Impaired functional mobility and activity tolerance Yes    Decreased ROM of lumbar spine     Chronic bilateral low back pain with bilateral sciatica      Physician: Simeon Rosen MD    Visit Date: 8/21/2024    Physician Orders: PT Eval and Treat Back  Medical Diagnosis from Referral: Chronic bilateral low back pain with bilateral sciatica  Evaluation Date: 8/20/2024  Authorization Period Expiration: 12/31/2024  Plan of Care Expiration: 10/3/2024  Progress Note Due: 9/19/2024  Date of Surgery: N/A  Visit # / Visits authorized: 1/ 20 (2 total)   FOTO: 1/ 3              Next survey due week of 9/3/2024     Precautions: Standard     PTA Visit #: 1/5     Time In: 0940  Time Out: 1018  Total Billable Time: 38 minutes    SUBJECTIVE     Pt reports: "It is getting better." Post: "It feels great!"  She was compliant with home exercise program.  Response to previous treatment: "It felt good."  Functional change: N/A at this time    Pain: 3/10  Location: bilateral low back      OBJECTIVE     Objective Measures updated at progress report unless specified.     Treatment     Jonathan received the treatments listed below: (activities not performed on this date in grey print, additions in bold print, and to be added/changed in blue print)    therapeutic exercises to develop endurance, ROM, flexibility, posture, and core stabilization for 30 minutes including:  Seated piriformis stretch 3x30s ea  Trunk AROM stretching 10x3s ea  Sidebending  Extension  Upper trunk rotation  Flexion physioball rollout  Standing gastroc stretches using wedge 3x30s  Supine/Hooklying hip flexor stretches 3x30s ea  Double knees to chest using physioball 10x3s  Lower trunk rotation 10x3s  Posterior pelvic tilt x10    manual therapy techniques: Soft tissue Mobilization " were applied to the: Lumbosacral region for 8 minutes, including:   Strumming and Cross-hand release to bilateral lumbar paraspinals  Trigger-point release over B sacroiliac joints    To be added:   Postural & Core stability training, Mini squats, Gluteus medius dips, Cat-camel, Bird-dog    Patient Education and Home Exercises     Home Exercises Provided and Patient Education Provided     Education provided:   - Education was provided on the rehabilitation process, and the patient was instructed in initial treatment program as stated above.    Written Home Exercises Provided: Patient instructed to cont prior HEP. Exercises were reviewed and Jonathan was able to demonstrate them prior to the end of the session.  Jonathan demonstrated good  understanding of the education provided. See EMR under Patient Instructions for exercises provided during therapy sessions.    ASSESSMENT     The patient reported to physical therapy stating minimal low back pain. Jonathan Hinton was instructed in initial treatment program to address flexibility for improved mobility and reduced restrictions, as well as improved stability of core, postural muscles, and gluteals for increased spinal support and form, reducing stresses on spine and reducing chances of future/further injury. Patient struggled slightly with posterior pelvic tilts, wanting to perform slight crunch with tilt. Trigger-points noted over bilateral sacroiliac joints, right more so than left. Patient reported feeling better post treatment.    Jonathan Is progressing well towards her goals.   Pt prognosis is Good.     Pt will continue to benefit from skilled outpatient physical therapy to address the deficits listed in the problem list box on initial evaluation, provide pt/family education and to maximize pt's level of independence in the home and community environment.     Pt's spiritual, cultural and educational needs considered and pt agreeable to plan of care and goals.      Anticipated barriers to physical therapy: None    Goals:   Short Term Goals: 3 weeks   1) Decrease maximal pain to < 5/10 Slowly progressing  2) Decrease tenderness to minimal Progressing  3) Patient will bend forward to perform LE dressing without significant increase in lower back pain Initiated/Ongoing  4) Patient will sit > 30 minutes without increased lower back pain Partially initiated/Ongoing     Long Term Goals: 6 weeks   1) Facilitate improved LE flexibility Initiated  2) Patient will consistently initiate walking after prolonged sitting without increased lower back pain Partially initiated/Ongoing  3) Patient will consistently get out of bed in the morning without increased LBP Ongoing  4) Improve functional score to > 75% as indicated by FOTO lumbar spine survey Initiated/Ongoing  5) Patient will be independent in HEP. Slowly progressing    PLAN   POC: 2 times weekly for 6 weeks to include the following interventions: Electrical Stimulation Pre-mod, Manual Therapy, Moist Heat/ Ice, Neuromuscular Re-ed, Patient Education, Therapeutic Activities, Therapeutic Exercise, and Therapeutic Taping . Louvenia may be treated by PTA as part of their rehab team.     The patient is to be progressed within the established plan of care as tolerated in order to accomplish goals as stated above. Plan to incorporate core, postural, and gluteal stability training for improved spinal support and form for reduced chance of future/further injury. Provide updated home exercise program as further progressed.    Daina Thomason, MARGARITA

## 2024-08-26 ENCOUNTER — CLINICAL SUPPORT (OUTPATIENT)
Dept: REHABILITATION | Facility: HOSPITAL | Age: 74
End: 2024-08-26
Payer: MEDICARE

## 2024-08-26 DIAGNOSIS — M54.42 CHRONIC BILATERAL LOW BACK PAIN WITH BILATERAL SCIATICA: ICD-10-CM

## 2024-08-26 DIAGNOSIS — G89.29 CHRONIC BILATERAL LOW BACK PAIN WITH BILATERAL SCIATICA: ICD-10-CM

## 2024-08-26 DIAGNOSIS — Z74.09 IMPAIRED FUNCTIONAL MOBILITY AND ACTIVITY TOLERANCE: Primary | ICD-10-CM

## 2024-08-26 DIAGNOSIS — M54.41 CHRONIC BILATERAL LOW BACK PAIN WITH BILATERAL SCIATICA: ICD-10-CM

## 2024-08-26 DIAGNOSIS — M53.86 DECREASED ROM OF LUMBAR SPINE: ICD-10-CM

## 2024-08-26 PROCEDURE — 97110 THERAPEUTIC EXERCISES: CPT | Mod: PN,CQ

## 2024-08-26 PROCEDURE — 97140 MANUAL THERAPY 1/> REGIONS: CPT | Mod: PN,CQ

## 2024-08-26 NOTE — PROGRESS NOTES
"[facility]   Physical Therapy Treatment Note     Name: Jonathan Hinton  Clinic Number: [id number]    Therapy Diagnosis:   Encounter Diagnoses   Name Primary?    Impaired functional mobility and activity tolerance Yes    Decreased ROM of lumbar spine     Chronic bilateral low back pain with bilateral sciatica      Physician: Simeon Rosen MD    Visit Date: 8/26/2024    Physician Orders: PT Eval and Treat Back  Medical Diagnosis from Referral: Chronic bilateral low back pain with bilateral sciatica  Evaluation Date: 8/20/2024  Authorization Period Expiration: 12/31/2024  Plan of Care Expiration: 10/3/2024  Progress Note Due: 9/19/2024  Date of Surgery: N/A  Visit # / Visits authorized: 2/ 20 (3 total)   FOTO: 1/ 3              Next survey due week of 9/3/2024     Precautions: Standard     PTA Visit #: 2/5     Time In: 1016  Time Out: 1101  Total Billable Time: 45 minutes    SUBJECTIVE     Pt reports: "I feel good."  She was compliant with home exercise program.  Response to previous treatment: "I haven't had any pain since last time, whatever you did with your hands worked."  Functional change: N/A at this time    Pain: 0/10  Location: bilateral low back      OBJECTIVE     Objective Measures updated at progress report unless specified.     Treatment     Jonathan received the treatments listed below: (activities not performed on this date in grey print, additions in bold print, and to be added/changed in blue print)    therapeutic exercises to develop endurance, ROM, flexibility, posture, and core stabilization for 37 minutes including:  Seated hamstring stretches 3x30s ea  Piriformis stretch 3x30s ea  Trunk AROM stretching 10x3s ea (D/C to HEP)  Sidebending  Extension  Upper trunk rotation  Flexion physioball rollout  Standing gastroc stretches using wedge 3x30s   Red theraband postural stability training    Rows w/scapular retractions x20    B shoulder extension w/scapular retractions " x20    B shoulder horizontal abduction w/scapular retractions x15    B shoulder external rotation at side w/scpalar retractions x15  Supine/Hooklying hip flexor stretches 3x30s ea  Double knees to chest using physioball 10x3s  Lower trunk rotation 10x3s  Posterior pelvic tilt x20    To be added:   Postural & Core stability training (Multifidus walkouts), Mini squats, Gluteus medius dips, Cat-camel, Bird-dog    manual therapy techniques: Soft tissue Mobilization were applied to the: Lumbosacral region for 8 minutes, including:   Strumming, Cross-hand release, and trigger-point release to bilateral thoracolumbar paraspinals & quadratus lumborum  Trigger-point release to bilateral gluteus medii    Patient Education and Home Exercises     Home Exercises Provided and Patient Education Provided     Education provided:   - Education was provided on progressions as noted above in bold print.    Written Home Exercises Provided: Patient instructed to cont prior HEP with additions. Exercises were reviewed and Jonathan was able to demonstrate them prior to the end of the session. Jonathan demonstrated good  understanding of the education provided. See EMR under Patient Instructions for exercises provided during therapy sessions.    ASSESSMENT     The patient reported to physical therapy without present pain, denying pain since previous session. Jonathan Hinton was progressed with increased stability training for improved form, reduced stresses on spine, and improved spinal support. Patient demonstrates impaired sitting posture (increased kyphosis, rounded shoulders, and forward head) and slightly impaired standing posture (rounded shoulders and forward head). Excess tension gradually reducing.    Jonathan Is progressing well towards her goals.   Pt prognosis is Good.     Pt will continue to benefit from skilled outpatient physical therapy to address the deficits listed in the problem list box on initial evaluation, provide  pt/family education and to maximize pt's level of independence in the home and community environment.     Pt's spiritual, cultural and educational needs considered and pt agreeable to plan of care and goals.     Anticipated barriers to physical therapy: None    Goals:  Short Term Goals: 3 weeks   1) Decrease maximal pain to < 5/10 Progressing  2) Decrease tenderness to minimal Progressing  3) Patient will bend forward to perform LE dressing without significant increase in lower back pain Progressing  4) Patient will sit > 30 minutes without increased lower back pain Progressing     Long Term Goals: 6 weeks   1) Facilitate improved LE flexibility Slowly progressing  2) Patient will consistently initiate walking after prolonged sitting without increased lower back pain Progressing  3) Patient will consistently get out of bed in the morning without increased LBP Progressing  4) Improve functional score to > 75% as indicated by FOTO lumbar spine survey Initiated/Ongoing  5) Patient will be independent in HEP. Progressing    PLAN   POC: 2 times weekly for 6 weeks to include the following interventions: Electrical Stimulation Pre-mod, Manual Therapy, Moist Heat/ Ice, Neuromuscular Re-ed, Patient Education, Therapeutic Activities, Therapeutic Exercise, and Therapeutic Taping. Louvenia may be treated by PTA as part of their rehab team.     The patient is to be progressed within the established plan of care as tolerated in order to accomplish goals as stated above. Plan to incorporate multifidus walkouts and gluteal stability training (see above in blue) for improved spinal support and form for reduced chance of future/further injury.    Daina Thomason, PTA

## 2024-08-28 ENCOUNTER — CLINICAL SUPPORT (OUTPATIENT)
Dept: REHABILITATION | Facility: HOSPITAL | Age: 74
End: 2024-08-28
Payer: MEDICARE

## 2024-08-28 DIAGNOSIS — G89.29 CHRONIC BILATERAL LOW BACK PAIN WITH BILATERAL SCIATICA: ICD-10-CM

## 2024-08-28 DIAGNOSIS — Z74.09 IMPAIRED FUNCTIONAL MOBILITY AND ACTIVITY TOLERANCE: Primary | ICD-10-CM

## 2024-08-28 DIAGNOSIS — M54.41 CHRONIC BILATERAL LOW BACK PAIN WITH BILATERAL SCIATICA: ICD-10-CM

## 2024-08-28 DIAGNOSIS — M54.42 CHRONIC BILATERAL LOW BACK PAIN WITH BILATERAL SCIATICA: ICD-10-CM

## 2024-08-28 DIAGNOSIS — M53.86 DECREASED ROM OF LUMBAR SPINE: ICD-10-CM

## 2024-08-28 PROCEDURE — 97110 THERAPEUTIC EXERCISES: CPT | Mod: PN

## 2024-08-28 PROCEDURE — 97140 MANUAL THERAPY 1/> REGIONS: CPT | Mod: PN

## 2024-08-28 NOTE — PROGRESS NOTES
"[facility]   Physical Therapy Treatment Note     Name: Jonathan Hinton  Clinic Number: [id number]    Therapy Diagnosis:   Encounter Diagnoses   Name Primary?    Impaired functional mobility and activity tolerance Yes    Chronic bilateral low back pain with bilateral sciatica     Decreased ROM of lumbar spine      Physician: Simeon Rosen MD    Visit Date: 8/28/2024    Physician Orders: PT Eval and Treat Back  Medical Diagnosis from Referral: Chronic bilateral low back pain with bilateral sciatica  Evaluation Date: 8/20/2024  Authorization Period Expiration: 12/31/2024  Plan of Care Expiration: 10/3/2024  Progress Note Due: 9/19/2024  Date of Surgery: N/A  Visit # / Visits authorized: 3/ 20 (4 total)   FOTO: 1/ 3              Next survey due week of 9/3/2024     Precautions: Standard     PTA Visit #: 0/5     Time In: 1025  Time Out: 1115  Total Billable Time: 40 minutes    SUBJECTIVE     Pt reports: "I was cleaning some chairs. I had to do a lot of bending to do that"  She was compliant with home exercise program.  Response to previous treatment: "Felt a lot better."  Functional change: Increased activity tolerance    Pain: 1/10  Location: bilateral low back      OBJECTIVE     Objective Measures updated at progress report unless specified.     Treatment     Jonathan received the treatments listed below: (activities not performed on this date in grey print, additions in bold print, and to be added/changed in blue print)    therapeutic exercises to develop endurance, ROM, flexibility, posture, and core stabilization for 37 minutes including (5 minutes unbilled due to overlapping with 1 other patient):  Recumbent bike L3 x 6'  Seated hamstring stretches 3x30s ea  Piriformis stretch 3x30s ea  Trunk AROM stretching 10x3s ea (D/C to HEP)  Sidebending  Extension  Upper trunk rotation  Flexion physioball rollout  Standing gastroc stretches using wedge 3x30s   Red theraband postural stability " "training    Rows w/scapular retractions x20    B shoulder extension w/scapular retractions x20    B shoulder horizontal abduction w/scapular retractions x20    B shoulder external rotation at side w/scpalar retractions x20  Supine/Hooklying hip flexor stretches 3x30s ea  Double knees to chest using physioball 15x3s  Lower trunk rotation 10x3s  Posterior pelvic tilt x20  Quadruped    Cat camel x 10     To be added:   Postural & Core stability training (Multifidus walkouts), Mini squats, Gluteus medius dips, Bird-dog    manual therapy techniques: Soft tissue Mobilization were applied to the: Lumbosacral region for 8 minutes, including:   Strumming, Cross-hand release, and trigger-point release to L thoracolumbar paraspinals & quadratus lumborum  Trigger-point release to bilateral gluteus medii and piriformis muscles    Patient Education and Home Exercises     Home Exercises Provided and Patient Education Provided     Education provided:   - Reviewed exercises as noted above with verbal and tactile cues for technique.    Written Home Exercises Provided: Patient instructed to cont prior HEP with additions. Exercises were reviewed and Jonathan was able to demonstrate them prior to the end of the session. Jonathan demonstrated good  understanding of the education provided. See EMR under Patient Instructions for exercises provided during therapy sessions.    ASSESSMENT     The patient presents to PT today reporting minimal lower back pain despite having "cleaned furniture" that contributed to prolonged use of poor posture.  She required frequent verbal and tactile cues for technique, demonstrating the greatest difficulty with correct performance of posterior pelvic tilt and cat/camel exercises.  She exhibited significant trigger points in R gluteus minimus and piriformis muscles.  Less so on L.  These trigger points decreased following STM    Jonathan Is progressing fairly well towards her goals.   Pt prognosis is Good. "     Pt will continue to benefit from skilled outpatient physical therapy to address the deficits listed in the problem list box on initial evaluation, provide pt/family education and to maximize pt's level of independence in the home and community environment.     Pt's spiritual, cultural and educational needs considered and pt agreeable to plan of care and goals.     Anticipated barriers to physical therapy: None    Goals:  Short Term Goals: 3 weeks   1) Decrease maximal pain to < 5/10 Progressing  2) Decrease tenderness to minimal Progressing  3) Patient will bend forward to perform LE dressing without significant increase in lower back pain Progressing  4) Patient will sit > 30 minutes without increased lower back pain Progressing     Long Term Goals: 6 weeks   1) Facilitate improved LE flexibility Slowly progressing  2) Patient will consistently initiate walking after prolonged sitting without increased lower back pain Progressing  3) Patient will consistently get out of bed in the morning without increased LBP Progressing  4) Improve functional score to > 75% as indicated by FOTO lumbar spine survey Initiated/Ongoing  5) Patient will be independent in HEP. Progressing    PLAN   POC: 2 times weekly for 6 weeks to include the following interventions: Electrical Stimulation Pre-mod, Manual Therapy, Moist Heat/ Ice, Neuromuscular Re-ed, Patient Education, Therapeutic Activities, Therapeutic Exercise, and Therapeutic Taping. Louvenia may be treated by PTA as part of their rehab team.     The patient is to be progressed within the established plan of care as tolerated in order to accomplish goals as stated above. Plan to incorporate multifidus walkouts and bird dog exercises to enhance lumbar flexibility and core stabilization.      Maddi Davalos, PT

## 2024-09-04 ENCOUNTER — CLINICAL SUPPORT (OUTPATIENT)
Dept: REHABILITATION | Facility: HOSPITAL | Age: 74
End: 2024-09-04
Payer: MEDICARE

## 2024-09-04 DIAGNOSIS — M54.41 CHRONIC BILATERAL LOW BACK PAIN WITH BILATERAL SCIATICA: ICD-10-CM

## 2024-09-04 DIAGNOSIS — G89.29 CHRONIC BILATERAL LOW BACK PAIN WITH BILATERAL SCIATICA: ICD-10-CM

## 2024-09-04 DIAGNOSIS — Z74.09 IMPAIRED FUNCTIONAL MOBILITY AND ACTIVITY TOLERANCE: Primary | ICD-10-CM

## 2024-09-04 DIAGNOSIS — M54.42 CHRONIC BILATERAL LOW BACK PAIN WITH BILATERAL SCIATICA: ICD-10-CM

## 2024-09-04 DIAGNOSIS — M53.86 DECREASED ROM OF LUMBAR SPINE: ICD-10-CM

## 2024-09-04 PROCEDURE — 97110 THERAPEUTIC EXERCISES: CPT | Mod: PN

## 2024-09-04 PROCEDURE — 97140 MANUAL THERAPY 1/> REGIONS: CPT | Mod: PN

## 2024-09-04 NOTE — PROGRESS NOTES
"[facility]   Physical Therapy Treatment Note     Name: Jonathan Hinton  Clinic Number: [id number]    Therapy Diagnosis:   Encounter Diagnoses   Name Primary?    Impaired functional mobility and activity tolerance Yes    Chronic bilateral low back pain with bilateral sciatica     Decreased ROM of lumbar spine        Physician: Simeon Rosen MD    Visit Date: 9/4/2024    Physician Orders: PT Eval and Treat Back  Medical Diagnosis from Referral: Chronic bilateral low back pain with bilateral sciatica  Evaluation Date: 8/20/2024  Authorization Period Expiration: 12/31/2024  Plan of Care Expiration: 10/3/2024  Progress Note Due: 9/19/2024  Date of Surgery: N/A  Visit # / Visits authorized: 4/ 20 (5 total)   FOTO: 1/ 3              Next survey due week of 9/3/2024     Precautions: Standard     PTA Visit #: 0/5     Time In: 1015  Time Out: 1100  Total Billable Time: 45 minutes    SUBJECTIVE     Pt reports: feeling good. No pain at the moment  She was compliant with home exercise program.  Response to previous treatment: "Felt a lot better."  Functional change: Increased activity tolerance    Pain: 0/10  Location: bilateral low back      OBJECTIVE     Objective Measures updated at progress report unless specified.     Treatment     Jonathan received the treatments listed below: (activities not performed on this date in grey print, additions in bold print, and to be added/changed in blue print)    therapeutic exercises to develop endurance, ROM, flexibility, posture, and core stabilization for 37 minutes including   Recumbent bike L3 x 6'  Seated hamstring stretches 3x30s ea  Piriformis stretch 3x30s ea  Trunk AROM stretching 10x3s ea (D/C to HEP)  Sidebending  Extension  Upper trunk rotation  Flexion physioball rollout  Standing gastroc stretches using wedge 3x30s   Red theraband postural stability training    Rows w/scapular retractions x20    B shoulder extension w/scapular retractions " "x20    B shoulder horizontal abduction w/scapular retractions x20    B shoulder external rotation at side w/scpalar retractions x20    Paloff press 3"H x 10 ea way  Supine/Hooklying hip flexor stretches 3x30s ea  Double knees to chest using physioball 15x3s  Lower trunk rotation 10x3s  Posterior pelvic tilt x20  Bridges with red thera-band at knees x 10  Clams with red thera-band x 20  Quadruped    Cat camel x 10 - max verbal and tactile cuing     To be added:   Postural & Core stability training (Multifidus walkouts), Mini squats, Gluteus medius dips, Bird-dog    manual therapy techniques: Soft tissue Mobilization were applied to the: Lumbosacral region for 8 minutes, including:   Strumming, Cross-hand release, and trigger-point release to L thoracolumbar paraspinals & quadratus lumborum  Trigger-point release to bilateral gluteus medii and piriformis muscles    Patient Education and Home Exercises     Home Exercises Provided and Patient Education Provided     Education provided:   - Reviewed exercises as noted above with verbal and tactile cues for technique.    Written Home Exercises Provided: Patient instructed to cont prior HEP with additions. Exercises were reviewed and Jonathan was able to demonstrate them prior to the end of the session. Jonathan demonstrated good  understanding of the education provided. See EMR under Patient Instructions for exercises provided during therapy sessions.    ASSESSMENT   Patient demonstrating good tolerance to treatment this date, able to perform all exercises and activities without provocation of symptoms. She does require verbal and tactile cuing with most exercises to ensure activation and isolation of target muscles. Patient with good effort and participation in therapy with focus on improving spinal mobility and core strength.     Jonathan Is progressing fairly well towards her goals.   Pt prognosis is Good.     Pt will continue to benefit from skilled outpatient physical " therapy to address the deficits listed in the problem list box on initial evaluation, provide pt/family education and to maximize pt's level of independence in the home and community environment.     Pt's spiritual, cultural and educational needs considered and pt agreeable to plan of care and goals.     Anticipated barriers to physical therapy: None    Goals:  Short Term Goals: 3 weeks   1) Decrease maximal pain to < 5/10 Progressing  2) Decrease tenderness to minimal Progressing  3) Patient will bend forward to perform LE dressing without significant increase in lower back pain Progressing  4) Patient will sit > 30 minutes without increased lower back pain Progressing     Long Term Goals: 6 weeks   1) Facilitate improved LE flexibility Slowly progressing  2) Patient will consistently initiate walking after prolonged sitting without increased lower back pain Progressing  3) Patient will consistently get out of bed in the morning without increased LBP Progressing  4) Improve functional score to > 75% as indicated by FOTO lumbar spine survey Initiated/Ongoing  5) Patient will be independent in HEP. Progressing    PLAN   POC: 2 times weekly for 6 weeks to include the following interventions: Electrical Stimulation Pre-mod, Manual Therapy, Moist Heat/ Ice, Neuromuscular Re-ed, Patient Education, Therapeutic Activities, Therapeutic Exercise, and Therapeutic Taping. Louvenia may be treated by PTA as part of their rehab team.     The patient is to be progressed within the established plan of care as tolerated in order to accomplish goals as stated above. Plan to incorporate multifidus walkouts and bird dog exercises to enhance lumbar flexibility and core stabilization.      Neha Tobar, PT

## 2024-09-09 ENCOUNTER — CLINICAL SUPPORT (OUTPATIENT)
Dept: REHABILITATION | Facility: HOSPITAL | Age: 74
End: 2024-09-09
Payer: MEDICARE

## 2024-09-09 DIAGNOSIS — G89.29 CHRONIC BILATERAL LOW BACK PAIN WITH BILATERAL SCIATICA: ICD-10-CM

## 2024-09-09 DIAGNOSIS — M53.86 DECREASED ROM OF LUMBAR SPINE: ICD-10-CM

## 2024-09-09 DIAGNOSIS — Z74.09 IMPAIRED FUNCTIONAL MOBILITY AND ACTIVITY TOLERANCE: Primary | ICD-10-CM

## 2024-09-09 DIAGNOSIS — M54.41 CHRONIC BILATERAL LOW BACK PAIN WITH BILATERAL SCIATICA: ICD-10-CM

## 2024-09-09 DIAGNOSIS — M54.42 CHRONIC BILATERAL LOW BACK PAIN WITH BILATERAL SCIATICA: ICD-10-CM

## 2024-09-09 PROCEDURE — 97110 THERAPEUTIC EXERCISES: CPT | Mod: PN

## 2024-09-09 NOTE — PROGRESS NOTES
"[facility]   Physical Therapy Treatment Note     Name: Jonathan Hinton  Clinic Number: [id number]    Therapy Diagnosis:   Encounter Diagnoses   Name Primary?    Impaired functional mobility and activity tolerance Yes    Chronic bilateral low back pain with bilateral sciatica     Decreased ROM of lumbar spine        Physician: Simeon Rosen MD    Visit Date: 9/9/2024    Physician Orders: PT Eval and Treat Back  Medical Diagnosis from Referral: Chronic bilateral low back pain with bilateral sciatica  Evaluation Date: 8/20/2024  Authorization Period Expiration: 12/31/2024  Plan of Care Expiration: 10/3/2024  Progress Note Due: 9/19/2024  Date of Surgery: N/A  Visit # / Visits authorized: 5/ 20 (6 total)   FOTO: 1/ 3              Next survey due week of 9/3/2024     Precautions: Standard     PTA Visit #: 0/5     Time In: 1015  Time Out: 1100  Total Billable Time: 45 minutes    SUBJECTIVE     Pt reports: feeling good. No pain at the moment  She was compliant with home exercise program.  Response to previous treatment: "Felt a lot better."  Functional change: able to do most activities pain free    Pain: 0/10  Location: bilateral low back      OBJECTIVE     Objective Measures updated at progress report unless specified.     Treatment     Jonathan received the treatments listed below: (activities not performed on this date in grey print, additions in bold print, and to be added/changed in blue print)    therapeutic exercises to develop endurance, ROM, flexibility, posture, and core stabilization for 40 minutes including   Recumbent bike L3 x 6'  Seated hamstring stretches 3x30s ea  Piriformis stretch 3x30s ea  Trunk AROM stretching 10x3s ea (D/C to HEP)  Sidebending  Extension  Upper trunk rotation  Flexion physioball rollout  Standing gastroc stretches using wedge 3x30s   Red theraband postural stability training    Rows w/scapular retractions x20    B shoulder extension w/scapular " "retractions x20    B shoulder horizontal abduction w/scapular retractions x20    B shoulder external rotation at side w/scpalar retractions x20    Paloff press 3"H x 10 ea way  Supine/Hooklying hip flexor stretches 3x30s ea  Double knees to chest using physioball 15x3s  Lower trunk rotation 10x3s  Posterior pelvic tilt x20  Bridges with red thera-band at knees 2 x 10  Knee fall out red thera-band x 10 ea  Side-lying clams red thera-band x 15 ea  Quadruped    Cat camel x 10 - max verbal and tactile cuing     To be added:   Postural & Core stability training (Multifidus walkouts), Mini squats, Gluteus medius dips, Bird-dog    manual therapy techniques: Soft tissue Mobilization were applied to the: Lumbosacral region for 00 minutes, including:   Strumming, Cross-hand release, and trigger-point release to L thoracolumbar paraspinals & quadratus lumborum  Trigger-point release to bilateral gluteus medii and piriformis muscles    Patient Education and Home Exercises     Home Exercises Provided and Patient Education Provided     Education provided:   - Reviewed exercises as noted above with verbal and tactile cues for technique.    Written Home Exercises Provided: Patient instructed to cont prior HEP with additions. Exercises were reviewed and Flacakris was able to demonstrate them prior to the end of the session. Flacakris demonstrated good  understanding of the education provided. See EMR under Patient Instructions for exercises provided during therapy sessions.    ASSESSMENT   Patient demonstrating good tolerance to treatment this date, able to perform all exercises and activities without provocation of symptoms. Able to progress without complaint. Continues to require cuing for set up of most exercises, but able to perform most with minimal cuing for technique once started. Patient is progressing steadily toward her goals.     Pt prognosis is Good.     Pt will continue to benefit from skilled outpatient physical therapy " to address the deficits listed in the problem list box on initial evaluation, provide pt/family education and to maximize pt's level of independence in the home and community environment.     Pt's spiritual, cultural and educational needs considered and pt agreeable to plan of care and goals.     Anticipated barriers to physical therapy: None    Goals:  Short Term Goals: 3 weeks   1) Decrease maximal pain to < 5/10 Progressing  2) Decrease tenderness to minimal Progressing  3) Patient will bend forward to perform LE dressing without significant increase in lower back pain Progressing  4) Patient will sit > 30 minutes without increased lower back pain Progressing     Long Term Goals: 6 weeks   1) Facilitate improved LE flexibility Slowly progressing  2) Patient will consistently initiate walking after prolonged sitting without increased lower back pain Progressing  3) Patient will consistently get out of bed in the morning without increased LBP Progressing  4) Improve functional score to > 75% as indicated by FOTO lumbar spine survey Initiated/Ongoing  5) Patient will be independent in HEP. Progressing    PLAN   POC: 2 times weekly for 6 weeks to include the following interventions: Electrical Stimulation Pre-mod, Manual Therapy, Moist Heat/ Ice, Neuromuscular Re-ed, Patient Education, Therapeutic Activities, Therapeutic Exercise, and Therapeutic Taping. Louvenia may be treated by PTA as part of their rehab team.     The patient is to be progressed within the established plan of care as tolerated in order to accomplish goals as stated above. Plan to incorporate multifidus walkouts and bird dog exercises to enhance lumbar flexibility and core stabilization.      Neha Tobar, PT

## 2024-09-16 ENCOUNTER — CLINICAL SUPPORT (OUTPATIENT)
Dept: REHABILITATION | Facility: HOSPITAL | Age: 74
End: 2024-09-16
Attending: PHYSICAL MEDICINE & REHABILITATION
Payer: MEDICARE

## 2024-09-16 DIAGNOSIS — G89.29 CHRONIC BILATERAL LOW BACK PAIN WITH BILATERAL SCIATICA: ICD-10-CM

## 2024-09-16 DIAGNOSIS — Z74.09 IMPAIRED FUNCTIONAL MOBILITY AND ACTIVITY TOLERANCE: Primary | ICD-10-CM

## 2024-09-16 DIAGNOSIS — M54.42 CHRONIC BILATERAL LOW BACK PAIN WITH BILATERAL SCIATICA: ICD-10-CM

## 2024-09-16 DIAGNOSIS — M54.41 CHRONIC BILATERAL LOW BACK PAIN WITH BILATERAL SCIATICA: ICD-10-CM

## 2024-09-16 DIAGNOSIS — M53.86 DECREASED ROM OF LUMBAR SPINE: ICD-10-CM

## 2024-09-16 PROCEDURE — 97110 THERAPEUTIC EXERCISES: CPT | Mod: PN

## 2024-09-16 NOTE — PROGRESS NOTES
"[facility]   Physical Therapy Treatment Note     Name: Jonathan Hinton  Clinic Number: [id number]    Therapy Diagnosis:   Encounter Diagnoses   Name Primary?    Impaired functional mobility and activity tolerance Yes    Chronic bilateral low back pain with bilateral sciatica     Decreased ROM of lumbar spine        Physician: Simeon Rosen MD    Visit Date: 9/16/2024    Physician Orders: PT Eval and Treat Back  Medical Diagnosis from Referral: Chronic bilateral low back pain with bilateral sciatica  Evaluation Date: 8/20/2024  Authorization Period Expiration: 12/31/2024  Plan of Care Expiration: 10/3/2024  Progress Note Due: 9/19/2024  Date of Surgery: N/A  Visit # / Visits authorized: 6/ 20 (7 total)   FOTO: 1/ 3              Next survey due week of 9/3/2024     Precautions: Standard     PTA Visit #: 0/5     Time In: 1015  Time Out: 1100  Total Billable Time: 45 minutes    SUBJECTIVE     Pt reports: no complaints. Had a good weekend. No pain at the moment. Every now and then when she is bent over too long she'll feel a little something in her back. Overall she's doing much better  She was compliant with home exercise program.  Response to previous treatment: "Felt a lot better."  Functional change: able to do most activities pain free    Pain: 0/10  Location: bilateral low back      OBJECTIVE     Objective Measures updated at progress report unless specified.     Treatment     Jonathan received the treatments listed below: (activities not performed on this date in grey print, additions in bold print, and to be added/changed in blue print)    therapeutic exercises to develop endurance, ROM, flexibility, posture, and core stabilization for 40 minutes including   Recumbent bike L3 x 6'  Seated hamstring stretches 3x30s ea  Piriformis stretch 3x30s ea  Trunk AROM stretching 10x3s ea (D/C to HEP)  Sidebending  Extension  Upper trunk rotation  Flexion physioball rollout  Standing gastroc " "stretches using wedge 3x30s   Red theraband postural stability training    Rows w/scapular retractions x20    B shoulder extension w/scapular retractions x20    B shoulder horizontal abduction w/scapular retractions x20    B shoulder external rotation at side w/scpalar retractions x20    Paloff press 3"H x 10 ea way  Sit to stand from hi/low mat with red thera-band at knees x 10  Supine/Hooklying hip flexor stretches 3x30s ea  Double knees to chest using physioball, hamstrings focus x 20  Lower trunk rotation 10x3s  Posterior pelvic tilt x20  Bridges with red thera-band at knees 2 x 10  Knee fall out red thera-band x 10 ea  Isometric abdominals with Physio-ball 3"H x 20  Side-lying clams red thera-band x 20 ea  Quadruped    Cat camel x 10 - max verbal and tactile cuing     To be added:   Postural & Core stability training (Multifidus walkouts), Mini squats, Gluteus medius dips, Bird-dog    manual therapy techniques: Soft tissue Mobilization were applied to the: Lumbosacral region for 00 minutes, including:   Strumming, Cross-hand release, and trigger-point release to L thoracolumbar paraspinals & quadratus lumborum  Trigger-point release to bilateral gluteus medii and piriformis muscles    Patient Education and Home Exercises     Home Exercises Provided and Patient Education Provided     Education provided:   - Reviewed exercises as noted above with verbal and tactile cues for technique.    Written Home Exercises Provided: Patient instructed to cont prior HEP with additions. Exercises were reviewed and Jonathan was able to demonstrate them prior to the end of the session. Jonathan demonstrated good  understanding of the education provided. See EMR under Patient Instructions for exercises provided during therapy sessions.    ASSESSMENT   Patient continues to demonstrate good tolerance to treatment with ability to complete all exercises without complaint. Patient progressing steadily.     Pt prognosis is Good.     Pt " will continue to benefit from skilled outpatient physical therapy to address the deficits listed in the problem list box on initial evaluation, provide pt/family education and to maximize pt's level of independence in the home and community environment.     Pt's spiritual, cultural and educational needs considered and pt agreeable to plan of care and goals.     Anticipated barriers to physical therapy: None    Goals:  Short Term Goals: 3 weeks   1) Decrease maximal pain to < 5/10 Progressing  2) Decrease tenderness to minimal Progressing  3) Patient will bend forward to perform LE dressing without significant increase in lower back pain Progressing  4) Patient will sit > 30 minutes without increased lower back pain Progressing     Long Term Goals: 6 weeks   1) Facilitate improved LE flexibility Slowly progressing  2) Patient will consistently initiate walking after prolonged sitting without increased lower back pain Progressing  3) Patient will consistently get out of bed in the morning without increased LBP Progressing  4) Improve functional score to > 75% as indicated by FOTO lumbar spine survey Initiated/Ongoing  5) Patient will be independent in HEP. Progressing    PLAN   POC: 2 times weekly for 6 weeks to include the following interventions: Electrical Stimulation Pre-mod, Manual Therapy, Moist Heat/ Ice, Neuromuscular Re-ed, Patient Education, Therapeutic Activities, Therapeutic Exercise, and Therapeutic Taping. Louvenia may be treated by PTA as part of their rehab team.     The patient is to be progressed within the established plan of care as tolerated in order to accomplish goals as stated above. Plan to incorporate multifidus walkouts and bird dog exercises to enhance lumbar flexibility and core stabilization.      Neha Tobar, PT

## 2024-09-18 ENCOUNTER — CLINICAL SUPPORT (OUTPATIENT)
Dept: REHABILITATION | Facility: HOSPITAL | Age: 74
End: 2024-09-18
Attending: PHYSICAL MEDICINE & REHABILITATION
Payer: MEDICARE

## 2024-09-18 DIAGNOSIS — M54.41 CHRONIC BILATERAL LOW BACK PAIN WITH BILATERAL SCIATICA: ICD-10-CM

## 2024-09-18 DIAGNOSIS — M54.42 CHRONIC BILATERAL LOW BACK PAIN WITH BILATERAL SCIATICA: ICD-10-CM

## 2024-09-18 DIAGNOSIS — G89.29 CHRONIC BILATERAL LOW BACK PAIN WITH BILATERAL SCIATICA: ICD-10-CM

## 2024-09-18 DIAGNOSIS — Z74.09 IMPAIRED FUNCTIONAL MOBILITY AND ACTIVITY TOLERANCE: Primary | ICD-10-CM

## 2024-09-18 DIAGNOSIS — M53.86 DECREASED ROM OF LUMBAR SPINE: ICD-10-CM

## 2024-09-18 PROCEDURE — 97110 THERAPEUTIC EXERCISES: CPT | Mod: KX,PN,CQ

## 2024-09-18 PROCEDURE — 97140 MANUAL THERAPY 1/> REGIONS: CPT | Mod: KX,PN,CQ

## 2024-09-18 NOTE — PROGRESS NOTES
"[facility]   Physical Therapy Treatment Note     Name: Jonathan Hinton  Clinic Number: [id number]    Therapy Diagnosis:   Encounter Diagnoses   Name Primary?    Impaired functional mobility and activity tolerance Yes    Decreased ROM of lumbar spine     Chronic bilateral low back pain with bilateral sciatica        Physician: Simeon Rosen MD    Visit Date: 9/18/2024    Physician Orders: PT Eval and Treat Back  Medical Diagnosis from Referral: Chronic bilateral low back pain with bilateral sciatica  Evaluation Date: 8/20/2024  Authorization Period Expiration: 12/31/2024  Plan of Care Expiration: 10/3/2024  Progress Note Due: 9/19/2024  Date of Surgery: N/A  Visit # / Visits authorized: 7/ 20 (8 total)   FOTO: 2/ 3 Completed 9/18/2024 with a Functional score of 72%  Modified Oswestry LBP Disability Ques 6/100 Higher Score = Greater Disability              Next survey due session 12     Precautions: Standard     PTA Visit #: 1/5     Time In: 1020  Time Out: 1104  Total Billable Time: 44 minutes    SUBJECTIVE     Pt reports: "It's hurting a little bit from sweeping and mopping."  She was compliant with home exercise program.  Response to previous treatment: "I felt alright."  Functional change: able to do most activities pain free    Pain: 6-7/10 "a little bit from sweeping and mopping"  Location: bilateral low back      OBJECTIVE     Objective Measures updated at progress report unless specified.     Treatment     Jonathan received the treatments listed below: (activities not performed on this date in grey print, additions in bold print, and to be added/changed in blue print)    therapeutic exercises to develop endurance, ROM, flexibility, posture, and core stabilization for 36 minutes including   Seated hamstring stretches 3x30s ea  Piriformis stretch 3x30s ea  Standing gastroc stretches using wedge 3x30s   Mini squats x20   Gluteus medius dips x15 ea   Theraband postural stability " "training    Rows w/scapular retractions on Airex foam x20 (green)    B shoulder extension w/scapular retractions on Airex foam x20 (green)    B shoulder horizontal abduction w/scapular retractions x20 (red)    B shoulder external rotation at side w/scpalar retractions x20 (red)    Paloff press 3"H x 10 ea way     Multifidus walkout x6 ea  Supine/Hooklying hip flexor stretches 3x30s ea  Double knees to chest using physioball, hamstrings focus 15x3s  Lower trunk rotation 10x3s (D/C to HEP)  Bridges with red thera-band at knees 2x10 (D/C to HEP)  Knee fall out red thera-band x10 ea (D/C to HEP)  Isometric abdominals with Physio-ball 20x3s  Side-lying clams red thera-band x20 ea (D/C to HEP)  Quadruped Cat camel x10 - max verbal and tactile cuing (review for HEP)    To be added: Bird-dog, Body mechanics training    manual therapy techniques: Soft tissue Mobilization were applied to the: Lumbosacral region for 8 minutes, including:   Strumming, Cross-hand release, and trigger-point release to lumbar paraspinals & quadratus lumborum  Trigger-point release to bilateral gluteus medii and piriformis muscles  Grade II sacroiliac jt mobilizations    Patient Education and Home Exercises     Home Exercises Provided and Patient Education Provided     Education provided:   - Education was provided on progressions as noted above in bold print.    Written Home Exercises Provided: Patient instructed to cont prior HEP with additions. Exercises were reviewed and Jonathan was able to demonstrate them prior to the end of the session. Jonathan demonstrated good  understanding of the education provided. See EMR under Patient Instructions for exercises provided during therapy sessions.    ASSESSMENT     The patient reported to physical therapy stating present back pain "a little bit," however reporting 6-7/10 on pain scale following housework. Jonathan Hinton was progressed with increased stability training for increased spinal support and " form, reducing chance of future/further injury. Patient required tactile cues initially for gluteus medius dips, as well as intermittently during multifidus walkouts. Decreasing paraspinal tension noted; however, excess tension noted over sacroiliac joints.    Pt prognosis is Good.     Pt will continue to benefit from skilled outpatient physical therapy to address the deficits listed in the problem list box on initial evaluation, provide pt/family education and to maximize pt's level of independence in the home and community environment.     Pt's spiritual, cultural and educational needs considered and pt agreeable to plan of care and goals.     Anticipated barriers to physical therapy: None    Goals:  Short Term Goals: 3 weeks   1) Decrease maximal pain to < 5/10 Progressing  2) Decrease tenderness to minimal Progressing  3) Patient will bend forward to perform LE dressing without significant increase in lower back pain Progressing  4) Patient will sit > 30 minutes without increased lower back pain Progressing     Long Term Goals: 6 weeks   1) Facilitate improved LE flexibility Slowly progressing  2) Patient will consistently initiate walking after prolonged sitting without increased lower back pain Progressing  3) Patient will consistently get out of bed in the morning without increased LBP Progressing  4) Improve functional score to > 75% as indicated by FOTO lumbar spine survey Nearly met  5) Patient will be independent in HEP. Progressing    PLAN   POC: 2 times weekly for 6 weeks to include the following interventions: Electrical Stimulation Pre-mod, Manual Therapy, Moist Heat/ Ice, Neuromuscular Re-ed, Patient Education, Therapeutic Activities, Therapeutic Exercise, and Therapeutic Taping. Louvenia may be treated by PTA as part of their rehab team.     The patient is to be progressed within the established plan of care as tolerated in order to accomplish goals as stated above. Plan to incorporate bird dog  subsequently, and update home exercise program, reviewing body mechanics as well to aide reduced stresses/chance of future injury during activities of daily living.    Daina Thomason, PTA

## 2024-09-23 ENCOUNTER — CLINICAL SUPPORT (OUTPATIENT)
Dept: REHABILITATION | Facility: HOSPITAL | Age: 74
End: 2024-09-23
Payer: MEDICARE

## 2024-09-23 DIAGNOSIS — Z74.09 IMPAIRED FUNCTIONAL MOBILITY AND ACTIVITY TOLERANCE: Primary | ICD-10-CM

## 2024-09-23 DIAGNOSIS — M54.42 CHRONIC BILATERAL LOW BACK PAIN WITH BILATERAL SCIATICA: ICD-10-CM

## 2024-09-23 DIAGNOSIS — G89.29 CHRONIC BILATERAL LOW BACK PAIN WITH BILATERAL SCIATICA: ICD-10-CM

## 2024-09-23 DIAGNOSIS — M53.86 DECREASED ROM OF LUMBAR SPINE: ICD-10-CM

## 2024-09-23 DIAGNOSIS — M54.41 CHRONIC BILATERAL LOW BACK PAIN WITH BILATERAL SCIATICA: ICD-10-CM

## 2024-09-23 PROCEDURE — 97110 THERAPEUTIC EXERCISES: CPT | Mod: KX,PN

## 2024-09-23 PROCEDURE — 97140 MANUAL THERAPY 1/> REGIONS: CPT | Mod: KX,PN

## 2024-09-23 NOTE — PROGRESS NOTES
"[facility]   Physical Therapy Treatment Note     Name: Jonathan Hinton  Clinic Number: [id number]    Therapy Diagnosis:   Encounter Diagnoses   Name Primary?    Impaired functional mobility and activity tolerance Yes    Decreased ROM of lumbar spine     Chronic bilateral low back pain with bilateral sciatica        Physician: Simeon Rosen MD    Visit Date: 9/23/2024    Physician Orders: PT Eval and Treat Back  Medical Diagnosis from Referral: Chronic bilateral low back pain with bilateral sciatica  Evaluation Date: 8/20/2024  Authorization Period Expiration: 12/31/2024  Plan of Care Expiration: 10/3/2024  Progress Note Due: 9/19/2024  Date of Surgery: N/A  Visit # / Visits authorized: 8/ 20 (9 total)   FOTO: 2/ 3 Completed 9/18/2024 with a Functional score of 72%  Modified Oswestry LBP Disability Ques 6/100 Higher Score = Greater Disability              Next survey due session 12     Precautions: Standard     PTA Visit #: 0/5     Time In: 1020  Time Out: 1113  Total Billable Time: 42 minutes    SUBJECTIVE     Pt reports: "I didn't do anything to irritate it"  She was compliant with home exercise program.  Response to previous treatment: "I felt good"  Functional change: able to do most activities pain free    Pain: 0/10  Location: bilateral low back      OBJECTIVE     Objective Measures updated at progress report unless specified.     Treatment     Jonathan received the treatments listed below: (activities not performed on this date in grey print, additions in bold print, and to be added/changed in blue print)    therapeutic exercises to develop endurance, ROM, flexibility, posture, and core stabilization for 42 minutes (8 minutes not billed due to overlapping time with 1 other patient) including   Seated hamstring stretches 3x30s ea  Piriformis stretch 3x30s ea  Standing gastroc stretches using wedge 3x30s   Mini squats x20   Gluteus medius dips x20 ea   Theraband postural stability " "training    Rows w/scapular retractions on Airex foam x20 (green)    B shoulder extension w/scapular retractions on Airex foam x20      B shoulder horizontal abduction w/scapular retractions x20 (green)    B shoulder external rotation at side w/scpalar retractions x20 (red)    Paloff press 3"H x 10 ea way     Multifidus walkout (green) x6 ea   Supine/Hooklying hip flexor stretches 3x30s ea  Double knees to chest using physioball, hamstrings focus 15x3s  Isometric abdominals with Physio-ball 20x3s  Lower trunk rotation 10x3s (D/C to HEP)  Bridges with red thera-band at knees 2x10 (D/C to HEP)  Knee fall out red thera-band x10 ea (D/C to HEP)  Side-lying clams red thera-band x20 ea (D/C to HEP)  Quadruped Cat camel x10 - max verbal and tactile cuing (review for HEP)   Bird dog x 10 ea  To be added:  Body mechanics training    manual therapy techniques: Soft tissue Mobilization were applied to the: Lumbosacral region for 8 minutes, including:   Strumming, Cross-hand release, and trigger-point release to lumbar paraspinals & quadratus lumborum  Trigger-point release to bilateral gluteus medii and piriformis muscles  Grade II sacroiliac jt mobilizations    Patient Education and Home Exercises     Home Exercises Provided and Patient Education Provided     Education provided:   - Education was provided on progressions as noted above in bold print.    Written Home Exercises Provided: Patient instructed to cont prior HEP with additions. Exercises were reviewed and Jonathan was able to demonstrate them prior to the end of the session. Jonathan demonstrated good  understanding of the education provided. See EMR under Patient Instructions for exercises provided during therapy sessions.    ASSESSMENT     The patient presents to PT today reporting minimal back pain. She struggled with technique for multifidus walk outs and quadruped bird dog. She required multiple verbal and tactile cues for technique.  She continues to exhibit " impaired posture in sitting but can correct it after verbal cues provided.     Pt prognosis is Good.     Pt will continue to benefit from skilled outpatient physical therapy to address the deficits listed in the problem list box on initial evaluation, provide pt/family education and to maximize pt's level of independence in the home and community environment.     Pt's spiritual, cultural and educational needs considered and pt agreeable to plan of care and goals.     Anticipated barriers to physical therapy: None    Goals:   Short Term Goals: 3 weeks   1) Decrease maximal pain to < 5/10 Progressing/nearly met  2) Decrease tenderness to minimal Progressing/nearly met  3) Patient will bend forward to perform LE dressing without significant increase in lower back pain Progressing  4) Patient will sit > 30 minutes without increased lower back pain Progressing     Long Term Goals: 6 weeks   1) Facilitate improved LE flexibility Progressing  2) Patient will consistently initiate walking after prolonged sitting without increased lower back pain Progressing  3) Patient will consistently get out of bed in the morning without increased LBP Progressing  4) Improve functional score to > 75% as indicated by FOTO lumbar spine survey Nearly met  5) Patient will be independent in HEP. Progressing    PLAN   POC: 2 times weekly for 6 weeks to include the following interventions: Electrical Stimulation Pre-mod, Manual Therapy, Moist Heat/ Ice, Neuromuscular Re-ed, Patient Education, Therapeutic Activities, Therapeutic Exercise, and Therapeutic Taping. Louvenia may be treated by PTA as part of their rehab team.     The patient is to be progressed within the established plan of care as tolerated in order to accomplish goals as stated above. Plan to increase core stabilization exercises as patient tolerates with addition of rhythmic stabilization using BOING/body blade exercises, cat/camel,     Maddi Davalos, PT

## 2024-09-25 ENCOUNTER — CLINICAL SUPPORT (OUTPATIENT)
Dept: REHABILITATION | Facility: HOSPITAL | Age: 74
End: 2024-09-25
Payer: MEDICARE

## 2024-09-25 DIAGNOSIS — Z74.09 IMPAIRED FUNCTIONAL MOBILITY AND ACTIVITY TOLERANCE: Primary | ICD-10-CM

## 2024-09-25 DIAGNOSIS — M54.42 CHRONIC BILATERAL LOW BACK PAIN WITH BILATERAL SCIATICA: ICD-10-CM

## 2024-09-25 DIAGNOSIS — M53.86 DECREASED ROM OF LUMBAR SPINE: ICD-10-CM

## 2024-09-25 DIAGNOSIS — M54.41 CHRONIC BILATERAL LOW BACK PAIN WITH BILATERAL SCIATICA: ICD-10-CM

## 2024-09-25 DIAGNOSIS — G89.29 CHRONIC BILATERAL LOW BACK PAIN WITH BILATERAL SCIATICA: ICD-10-CM

## 2024-09-25 PROCEDURE — 97110 THERAPEUTIC EXERCISES: CPT | Mod: KX,PN,CQ

## 2024-09-25 NOTE — PROGRESS NOTES
"[facility]   Physical Therapy Treatment Note     Name: Jonathan Hinton  Clinic Number: [id number]    Therapy Diagnosis:   Encounter Diagnoses   Name Primary?    Impaired functional mobility and activity tolerance Yes    Decreased ROM of lumbar spine     Chronic bilateral low back pain with bilateral sciatica        Physician: Simeon Rosen MD    Visit Date: 9/25/2024    Physician Orders: PT Eval and Treat Back  Medical Diagnosis from Referral: Chronic bilateral low back pain with bilateral sciatica  Evaluation Date: 8/20/2024  Authorization Period Expiration: 12/31/2024  Plan of Care Expiration: 10/3/2024  Progress Note Due: 9/19/2024  Date of Surgery: N/A  Visit # / Visits authorized: 9/ 20 (10 total)   FOTO: 2/ 3 Completed 9/18/2024 with a Functional score of 72%  Modified Oswestry LBP Disability Ques 6/100 Higher Score = Greater Disability              Next survey due session 12     Precautions: Standard     PTA Visit #: 1/5     Time In: 1018  Time Out: 1059  Total Billable Time: 41 minutes    SUBJECTIVE     Pt reports: "I am doing pretty good."  She was compliant with home exercise program.  Response to previous treatment: "I felt good."  Functional change: able to do most activities pain free    Pain: 0/10  Location: bilateral low back      OBJECTIVE     Objective Measures updated at progress report unless specified.     Treatment     Jonathan received the treatments listed below: (activities not performed on this date in grey print, additions in bold print, and to be added/changed in blue print)    therapeutic exercises to develop endurance, ROM, flexibility, posture, and core stabilization for 41 minutes, including   Standing gastroc stretches using wedge 3x30s   Mini squats x20   Gluteus medius dips x20 ea   Theraband postural stability training    Rows w/scapular retractions on Airex foam x20 (green)    B shoulder extension w/scapular retractions on Airex foam x20 " "(green)     B shoulder horizontal abduction w/scapular retractions x20 (green)    B shoulder external rotation at side w/scpalar retractions x20 (red)    Paloff press 3"H x20 ea way     Multifidus walkout (green) x8 ea (tactile cues required)  Seated hamstring stretches 3x30s ea  Piriformis stretch 3x30s ea  Supine/Hooklying hip flexor stretches 3x30s ea  Double knees to chest using physioball, hamstrings focus 15x3s  Isometric abdominals with Physio-ball 20x3s (continue)  Lower trunk rotation 10x3s (D/C to HEP)  Bridges with red thera-band at knees 2x10 (D/C to HEP)  Knee fall out red thera-band x10 ea (D/C to HEP)  Side-lying clams red thera-band x20 ea (D/C to HEP)  Quadruped Cat camel x10 - max verbal and tactile cuing (review for HEP)   Bird dog x10 ea (tactile cues required)    To be added: Body mechanics training    manual therapy techniques: Soft tissue Mobilization were applied to the: Lumbosacral region for 0 minutes, including:   Strumming, Cross-hand release, and trigger-point release to lumbar paraspinals & quadratus lumborum  Trigger-point release to bilateral gluteus medii and piriformis muscles  Grade II sacroiliac jt mobilizations    Patient Education and Home Exercises     Home Exercises Provided and Patient Education Provided     Education provided:   - Education was provided on progressions as noted above in bold print.    Written Home Exercises Provided: Patient instructed to cont prior HEP. Exercises were reviewed and Jonathan was able to demonstrate them prior to the end of the session. Jonathan demonstrated good  understanding of the education provided. See EMR under Patient Instructions for exercises provided during therapy sessions.    ASSESSMENT     The patient reported to physical therapy without present complaint. Joanthan Hinton was progressed with increased stability training for improved spinal support and form; however, the patient required tactile cues for form during multifidus " walkouts and bird dog exercise. Increased thoracic kyphosis continued. Patient reports not performing bending activities much lately; however slight pain noted during.    Pt prognosis is Good.     Pt will continue to benefit from skilled outpatient physical therapy to address the deficits listed in the problem list box on initial evaluation, provide pt/family education and to maximize pt's level of independence in the home and community environment.     Pt's spiritual, cultural and educational needs considered and pt agreeable to plan of care and goals.     Anticipated barriers to physical therapy: None    Goals:  Short Term Goals: 3 weeks   1) Decrease maximal pain to < 5/10 Nearly met  2) Decrease tenderness to minimal Met  3) Patient will bend forward to perform LE dressing without significant increase in lower back pain Progressing/Nearly met  4) Patient will sit > 30 minutes without increased lower back pain Nearly met     Long Term Goals: 6 weeks   1) Facilitate improved LE flexibility Progressing  2) Patient will consistently initiate walking after prolonged sitting without increased lower back pain Progressing/Nearly met  3) Patient will consistently get out of bed in the morning without increased LBP Nearly met  4) Improve functional score to > 75% as indicated by FOTO lumbar spine survey Nearly met  5) Patient will be independent in HEP. Progressing    PLAN   POC: 2 times weekly for 6 weeks to include the following interventions: Electrical Stimulation Pre-mod, Manual Therapy, Moist Heat/ Ice, Neuromuscular Re-ed, Patient Education, Therapeutic Activities, Therapeutic Exercise, and Therapeutic Taping. Louvenia may be treated by PTA as part of their rehab team.     The patient is to be progressed within the established plan of care as tolerated in order to accomplish goals as stated above. Plan to review body mechanics training to reduce chance of future/further injury, and update home exercise program in  preporation for discharge.    Daina Thomason, PTA

## 2024-09-30 ENCOUNTER — CLINICAL SUPPORT (OUTPATIENT)
Dept: REHABILITATION | Facility: HOSPITAL | Age: 74
End: 2024-09-30
Attending: PHYSICAL MEDICINE & REHABILITATION
Payer: MEDICARE

## 2024-09-30 DIAGNOSIS — M54.42 CHRONIC BILATERAL LOW BACK PAIN WITH BILATERAL SCIATICA: ICD-10-CM

## 2024-09-30 DIAGNOSIS — M53.86 DECREASED ROM OF LUMBAR SPINE: ICD-10-CM

## 2024-09-30 DIAGNOSIS — M54.41 CHRONIC BILATERAL LOW BACK PAIN WITH BILATERAL SCIATICA: ICD-10-CM

## 2024-09-30 DIAGNOSIS — Z74.09 IMPAIRED FUNCTIONAL MOBILITY AND ACTIVITY TOLERANCE: Primary | ICD-10-CM

## 2024-09-30 DIAGNOSIS — G89.29 CHRONIC BILATERAL LOW BACK PAIN WITH BILATERAL SCIATICA: ICD-10-CM

## 2024-09-30 PROCEDURE — 97110 THERAPEUTIC EXERCISES: CPT | Mod: KX,PN,CQ

## 2024-09-30 PROCEDURE — 97530 THERAPEUTIC ACTIVITIES: CPT | Mod: KX,PN,CQ

## 2024-09-30 NOTE — PROGRESS NOTES
"[facility]   Physical Therapy Treatment Note/Discharge Summary    Name: Jonathan Hinton  Clinic Number: [id number]    Therapy Diagnosis:   Encounter Diagnoses   Name Primary?    Impaired functional mobility and activity tolerance Yes    Decreased ROM of lumbar spine     Chronic bilateral low back pain with bilateral sciatica        Physician: Simeon Rosen MD    Visit Date: 9/30/2024    Physician Orders: PT Eval and Treat Back  Medical Diagnosis from Referral: Chronic bilateral low back pain with bilateral sciatica  Evaluation Date: 8/20/2024  Authorization Period Expiration: 12/31/2024  Plan of Care Expiration: 10/3/2024  Progress Note Due: 9/19/2024  Date of Surgery: N/A  Visit # / Visits authorized: 10/ 20 (11 total)   FOTO: 2/ 3 Completed 9/30/2024 with a Functional score of 67%  Modified Oswestry LBP Disability Ques 10/100 Higher Score = Greater Disability     Precautions: Standard     PTA Visit #: 2/5     No-Show Appointments: 0  Cancelled Appointments: 2    Time In: 0907  Time Out: 0954  Total Billable Time: 47 minutes    SUBJECTIVE     Pt reports: "Going good."  She was somewhat compliant with home exercise program.  Response to previous treatment: "Felt good."  Functional change: able to do most activities pain free    Pain: 0/10  Location: bilateral low back      OBJECTIVE     Objective Measures updated at progress report unless specified.     Treatment     Jonathan received the treatments listed below: (activities not performed on this date in grey print, additions in bold print, and to be added/changed in blue print)    therapeutic exercises to develop endurance, ROM, flexibility, posture, and core stabilization for 38 minutes, including   Standing gastroc stretches using wedge 3x30s   Mini squats x20   Gluteus medius dips x20 ea   Theraband postural stability training    Rows w/scapular retractions on Airex foam x20 (green)    B shoulder extension w/scapular retractions " "on Airex foam x20 (green)     B shoulder horizontal abduction w/scapular retractions x20 (green)    B shoulder external rotation at side w/scpalar retractions x20 (red)    Paloff press 3"H (green) x20 ea way     Multifidus walkout (green) x8 ea (tactile cues required)  Seated hamstring stretches 3x30s ea  Piriformis stretch 3x30s ea  Supine/Hooklying hip flexor stretches 3x30s ea  Double knees to chest using physioball, hamstrings focus 15x3s  Isometric abdominals with Physio-ball 20x3s  Lower trunk rotation 10x3s  Bridges with red thera-band at knees 2x10  Knee fall out red thera-band x10 ea  Side-lying clams red thera-band x20 ea  Quadruped Cat camel x10 - max verbal and tactile cuing   Bird dog x10 ea (tactile cues required)    therapeutic activities to improve functional performance for 9 minutes, including:   Body mechanics training for lifting from waist level, ground level using 23# box, as well as for picking up light objects, sweeping, mopping, vacuuming, raking    manual therapy techniques: Soft tissue Mobilization were applied to the: Lumbosacral region for 0 minutes, including:   Strumming, Cross-hand release, and trigger-point release to lumbar paraspinals & quadratus lumborum  Trigger-point release to bilateral gluteus medii and piriformis muscles  Grade II sacroiliac jt mobilizations    Patient Education and Home Exercises     Home Exercises Provided and Patient Education Provided     Education provided:   - Education was provided on body mechanics with reasoning, as well as on updated HEP.    Written Home Exercises Provided: Patient instructed to cont prior HEP, as well as additions. Exercises were reviewed and Jonathan was able to demonstrate them prior to the end of the session. Jonathan demonstrated good  understanding of the education provided. See EMR under Patient Instructions for exercises provided during therapy sessions.    ASSESSMENT     The patient reported to physical therapy without " present complaint, and has deferred recent symptoms as well. Patient has been receiving skilled physical therapy services after being referred for chronic bilateral low back pain with bilateral sciatica. This impacts function, mobility, activity tolerance, and activities of daily living. However, symptoms have improved following treatments. Jonathan Hinton was progressed with review of body mechanics training in preporation for discharge. The patient was able to perform well after instruction, and she reports being able to feel a difference with proper performance compared to how she had been doing activities. The patient is discharged from physical therapy at this time to home exercise program for maintenance, but may return as needed with additional orders.    Pt prognosis is Good.     Pt will continue to benefit from skilled outpatient physical therapy to address the deficits listed in the problem list box on initial evaluation, provide pt/family education and to maximize pt's level of independence in the home and community environment.     Pt's spiritual, cultural and educational needs considered and pt agreeable to plan of care and goals.     Anticipated barriers to physical therapy: None    Goals:  Short Term Goals: 3 weeks   1) Decrease maximal pain to < 5/10 Met  2) Decrease tenderness to minimal Met  3) Patient will bend forward to perform LE dressing without significant increase in lower back pain Met  4) Patient will sit > 30 minutes without increased lower back pain Met     Long Term Goals: 6 weeks   1) Facilitate improved LE flexibility Met  2) Patient will consistently initiate walking after prolonged sitting without increased lower back pain Met  3) Patient will consistently get out of bed in the morning without increased LBP Met  4) Improve functional score to > 75% as indicated by FOTO lumbar spine survey Slightly regressed from previous (Nearly met, previous 72%)  5) Patient will be independent in  HEP. Met    PLAN   POC: 2 times weekly for 6 weeks to include the following interventions: Electrical Stimulation Pre-mod, Manual Therapy, Moist Heat/ Ice, Neuromuscular Re-ed, Patient Education, Therapeutic Activities, Therapeutic Exercise, and Therapeutic Taping. Louvenia may be treated by PTA as part of their rehab team.     The patient is discharged to established home exercise program for maintenance under physician care; however, may return as needed with additional orders.    Daina Thomason, PTA

## 2024-10-17 ENCOUNTER — OFFICE VISIT (OUTPATIENT)
Dept: FAMILY MEDICINE | Facility: CLINIC | Age: 74
End: 2024-10-17
Payer: MEDICARE

## 2024-10-17 ENCOUNTER — LAB VISIT (OUTPATIENT)
Dept: LAB | Facility: HOSPITAL | Age: 74
End: 2024-10-17
Attending: STUDENT IN AN ORGANIZED HEALTH CARE EDUCATION/TRAINING PROGRAM
Payer: MEDICARE

## 2024-10-17 VITALS
HEART RATE: 49 BPM | SYSTOLIC BLOOD PRESSURE: 150 MMHG | BODY MASS INDEX: 23.43 KG/M2 | TEMPERATURE: 98 F | OXYGEN SATURATION: 98 % | DIASTOLIC BLOOD PRESSURE: 80 MMHG | WEIGHT: 124.13 LBS | HEIGHT: 61 IN

## 2024-10-17 DIAGNOSIS — I73.9 PVD (PERIPHERAL VASCULAR DISEASE): ICD-10-CM

## 2024-10-17 DIAGNOSIS — E03.9 HYPOTHYROIDISM, UNSPECIFIED TYPE: ICD-10-CM

## 2024-10-17 DIAGNOSIS — E78.2 MIXED HYPERLIPIDEMIA: ICD-10-CM

## 2024-10-17 DIAGNOSIS — R00.1 BRADYCARDIA: Primary | ICD-10-CM

## 2024-10-17 DIAGNOSIS — R00.1 BRADYCARDIA: ICD-10-CM

## 2024-10-17 LAB
ALBUMIN SERPL BCP-MCNC: 4.2 G/DL (ref 3.5–5.2)
ALP SERPL-CCNC: 43 U/L (ref 40–150)
ALT SERPL W/O P-5'-P-CCNC: 17 U/L (ref 10–44)
ANION GAP SERPL CALC-SCNC: 17 MMOL/L (ref 8–16)
AST SERPL-CCNC: 22 U/L (ref 10–40)
BILIRUB SERPL-MCNC: 0.4 MG/DL (ref 0.1–1)
BUN SERPL-MCNC: 18 MG/DL (ref 8–23)
CALCIUM SERPL-MCNC: 10.3 MG/DL (ref 8.7–10.5)
CHLORIDE SERPL-SCNC: 102 MMOL/L (ref 95–110)
CHOLEST SERPL-MCNC: 232 MG/DL (ref 120–199)
CHOLEST/HDLC SERPL: 3.1 {RATIO} (ref 2–5)
CO2 SERPL-SCNC: 23 MMOL/L (ref 23–29)
CREAT SERPL-MCNC: 1 MG/DL (ref 0.5–1.4)
EST. GFR  (NO RACE VARIABLE): 59.5 ML/MIN/1.73 M^2
GLUCOSE SERPL-MCNC: 87 MG/DL (ref 70–110)
HDLC SERPL-MCNC: 76 MG/DL (ref 40–75)
HDLC SERPL: 32.8 % (ref 20–50)
LDLC SERPL CALC-MCNC: 135.4 MG/DL (ref 63–159)
NONHDLC SERPL-MCNC: 156 MG/DL
POTASSIUM SERPL-SCNC: 4.6 MMOL/L (ref 3.5–5.1)
PROT SERPL-MCNC: 8.5 G/DL (ref 6–8.4)
SODIUM SERPL-SCNC: 142 MMOL/L (ref 136–145)
T4 FREE SERPL-MCNC: 0.84 NG/DL (ref 0.71–1.51)
TRIGL SERPL-MCNC: 103 MG/DL (ref 30–150)
TSH SERPL DL<=0.005 MIU/L-ACNC: 14.86 UIU/ML (ref 0.4–4)

## 2024-10-17 PROCEDURE — 3077F SYST BP >= 140 MM HG: CPT | Mod: CPTII,S$GLB,, | Performed by: STUDENT IN AN ORGANIZED HEALTH CARE EDUCATION/TRAINING PROGRAM

## 2024-10-17 PROCEDURE — 1160F RVW MEDS BY RX/DR IN RCRD: CPT | Mod: CPTII,S$GLB,, | Performed by: STUDENT IN AN ORGANIZED HEALTH CARE EDUCATION/TRAINING PROGRAM

## 2024-10-17 PROCEDURE — 3079F DIAST BP 80-89 MM HG: CPT | Mod: CPTII,S$GLB,, | Performed by: STUDENT IN AN ORGANIZED HEALTH CARE EDUCATION/TRAINING PROGRAM

## 2024-10-17 PROCEDURE — 3288F FALL RISK ASSESSMENT DOCD: CPT | Mod: CPTII,S$GLB,, | Performed by: STUDENT IN AN ORGANIZED HEALTH CARE EDUCATION/TRAINING PROGRAM

## 2024-10-17 PROCEDURE — 84439 ASSAY OF FREE THYROXINE: CPT | Performed by: STUDENT IN AN ORGANIZED HEALTH CARE EDUCATION/TRAINING PROGRAM

## 2024-10-17 PROCEDURE — 99999 PR PBB SHADOW E&M-EST. PATIENT-LVL IV: CPT | Mod: PBBFAC,,, | Performed by: STUDENT IN AN ORGANIZED HEALTH CARE EDUCATION/TRAINING PROGRAM

## 2024-10-17 PROCEDURE — 84443 ASSAY THYROID STIM HORMONE: CPT | Performed by: STUDENT IN AN ORGANIZED HEALTH CARE EDUCATION/TRAINING PROGRAM

## 2024-10-17 PROCEDURE — 99214 OFFICE O/P EST MOD 30 MIN: CPT | Mod: S$GLB,,, | Performed by: STUDENT IN AN ORGANIZED HEALTH CARE EDUCATION/TRAINING PROGRAM

## 2024-10-17 PROCEDURE — 1126F AMNT PAIN NOTED NONE PRSNT: CPT | Mod: CPTII,S$GLB,, | Performed by: STUDENT IN AN ORGANIZED HEALTH CARE EDUCATION/TRAINING PROGRAM

## 2024-10-17 PROCEDURE — 1101F PT FALLS ASSESS-DOCD LE1/YR: CPT | Mod: CPTII,S$GLB,, | Performed by: STUDENT IN AN ORGANIZED HEALTH CARE EDUCATION/TRAINING PROGRAM

## 2024-10-17 PROCEDURE — 3044F HG A1C LEVEL LT 7.0%: CPT | Mod: CPTII,S$GLB,, | Performed by: STUDENT IN AN ORGANIZED HEALTH CARE EDUCATION/TRAINING PROGRAM

## 2024-10-17 PROCEDURE — 1159F MED LIST DOCD IN RCRD: CPT | Mod: CPTII,S$GLB,, | Performed by: STUDENT IN AN ORGANIZED HEALTH CARE EDUCATION/TRAINING PROGRAM

## 2024-10-17 PROCEDURE — 80053 COMPREHEN METABOLIC PANEL: CPT | Performed by: STUDENT IN AN ORGANIZED HEALTH CARE EDUCATION/TRAINING PROGRAM

## 2024-10-17 PROCEDURE — 3008F BODY MASS INDEX DOCD: CPT | Mod: CPTII,S$GLB,, | Performed by: STUDENT IN AN ORGANIZED HEALTH CARE EDUCATION/TRAINING PROGRAM

## 2024-10-17 PROCEDURE — 36415 COLL VENOUS BLD VENIPUNCTURE: CPT | Mod: PO | Performed by: STUDENT IN AN ORGANIZED HEALTH CARE EDUCATION/TRAINING PROGRAM

## 2024-10-17 PROCEDURE — G2211 COMPLEX E/M VISIT ADD ON: HCPCS | Mod: S$GLB,,, | Performed by: STUDENT IN AN ORGANIZED HEALTH CARE EDUCATION/TRAINING PROGRAM

## 2024-10-17 PROCEDURE — 4010F ACE/ARB THERAPY RXD/TAKEN: CPT | Mod: CPTII,S$GLB,, | Performed by: STUDENT IN AN ORGANIZED HEALTH CARE EDUCATION/TRAINING PROGRAM

## 2024-10-17 PROCEDURE — 80061 LIPID PANEL: CPT | Performed by: STUDENT IN AN ORGANIZED HEALTH CARE EDUCATION/TRAINING PROGRAM

## 2024-10-17 RX ORDER — ROSUVASTATIN CALCIUM 10 MG/1
10 TABLET, COATED ORAL NIGHTLY
Qty: 90 TABLET | Refills: 3 | Status: SHIPPED | OUTPATIENT
Start: 2024-10-17 | End: 2025-10-17

## 2024-10-17 NOTE — PROGRESS NOTES
SUBJECTIVE:    CHIEF COMPLAINT:   Chief Complaint   Patient presents with    Hypertension           274}    HISTORY OF PRESENT ILLNESS:  History of Present Illness    CHIEF COMPLAINT:  Ms. Hinton presents for a follow-up visit to discuss thyroid function and medication management.    HPI:  Ms. Hinton reports feeling well overall. She has been adhering to her thyroid medication regimen, taking it before meals with water. She has occasional leg cramping, but it is not significantly bothersome. She denies recent episodes of syncope, weakness, dizziness, or palpitations. She has been taking Fosamax once weekly for osteoporosis and tolerates this medication well. Ms. Hinton indicates a potential need for an increased dosage of gabapentin. She is under the care of an endocrinologist, Dr. Peyton Ellis, for her thyroid condition.      ROS:  General: -fever, -chills, -fatigue, -weight gain, -weight loss  Eyes: -vision changes, -redness, -discharge  ENT: -ear pain, -nasal congestion, -sore throat  Cardiovascular: -chest pain, -palpitations, -lower extremity edema  Respiratory: -cough, -shortness of breath  Gastrointestinal: -abdominal pain, -nausea, -vomiting, -diarrhea, -constipation, -blood in stool  Genitourinary: -dysuria, -hematuria, -frequency  Musculoskeletal: -joint pain, -muscle pain, +muscle cramps  Skin: -rash, -lesion  Neurological: -headache, -dizziness, -numbness, -tingling, -weakness  Psychiatric: -anxiety, -depression, -sleep difficulty           PAST MEDICAL HISTORY:     274}  Past Medical History:   Diagnosis Date    Hyperlipidemia     Hypertension     Hyperthyroidism        PAST SURGICAL HISTORY:  Past Surgical History:   Procedure Laterality Date    COLONOSCOPY      THYROIDECTOMY Bilateral 7/14/2022    Procedure: THYROIDECTOMY;  Surgeon: Rafy Cannon MD;  Location: [facility];  Service: ENT;  Laterality: Bilateral;    TUBAL LIGATION      WISDOM TOOTH EXTRACTION         SOCIAL HISTORY:  Social History  "    Socioeconomic History    Marital status:    Tobacco Use    Smoking status: Former     Current packs/day: 0.00     Types: Cigarettes     Quit date: 6/30/2021     Years since quitting: 3.3    Smokeless tobacco: Former   Substance and Sexual Activity    Alcohol use: Not Currently    Drug use: Never    Sexual activity: Not Currently       FAMILY HISTORY:       No family history on file.    ALLERGIES AND MEDICATIONS: updated and reviewed.      274}  Review of patient's allergies indicates:  No Known Allergies  Medication List with Changes/Refills   New Medications    ROSUVASTATIN (CRESTOR) 10 MG TABLET    Take 1 tablet (10 mg total) by mouth every evening.   Current Medications    ALENDRONATE (FOSAMAX) 70 MG TABLET    Take 1 tablet by mouth once a week    DICLOFENAC (VOLTAREN) 75 MG EC TABLET    Take 1 tablet (75 mg total) by mouth 2 (two) times daily.    GABAPENTIN (NEURONTIN) 300 MG CAPSULE    Take 1 capsule (300 mg total) by mouth every evening.    LEVOTHYROXINE (SYNTHROID) 50 MCG TABLET    TAKE 1 TABLET BY MOUTH BEFORE  BREAKFAST    LISINOPRIL-HYDROCHLOROTHIAZIDE (PRINZIDE,ZESTORETIC) 10-12.5 MG PER TABLET    TAKE 1 TABLET BY MOUTH DAILY   Discontinued Medications    SIMVASTATIN (ZOCOR) 40 MG TABLET    Take 1 tablet (40 mg total) by mouth every evening.       SCREENING HISTORY:    274}  Health Maintenance         Date Due Completion Date    TETANUS VACCINE Never done ---    Shingles Vaccine (1 of 2) Never done ---    RSV Vaccine (Age 60+ and Pregnant patients) (1 - Risk 60-74 years 1-dose series) Never done ---    Influenza Vaccine (1) 09/01/2024 2/14/2023    Mammogram 05/14/2025 5/14/2024    DEXA Scan 05/14/2027 5/14/2024    Colorectal Cancer Screening 02/26/2029 2/26/2019    Lipid Panel 07/16/2029 7/16/2024                  PHYSICAL EXAM:      274}  BP (!) 150/80 (BP Location: Right arm, Patient Position: Sitting)   Pulse (!) 49   Temp 98.2 °F (36.8 °C) (Oral)   Ht 5' 1" (1.549 m)   Wt 56.3 kg (124 " "lb 1.9 oz)   SpO2 98%   BMI 23.45 kg/m²   Wt Readings from Last 3 Encounters:   10/17/24 56.3 kg (124 lb 1.9 oz)   08/20/24 56.4 kg (124 lb 5.4 oz)   07/16/24 56.4 kg (124 lb 5.4 oz)     BP Readings from Last 3 Encounters:   10/17/24 (!) 150/80   07/16/24 138/74   01/18/24 (!) 148/82     Estimated body mass index is 23.45 kg/m² as calculated from the following:    Height as of this encounter: 5' 1" (1.549 m).    Weight as of this encounter: 56.3 kg (124 lb 1.9 oz).       Physical Exam    Vitals: BMI: 23. Blood pressure: Elevated. Heart rate: 49 bpm.  General: No acute distress. Well-developed. Well-nourished.  Eyes: EOMI. Sclerae anicteric.  HENT: Normocephalic. Atraumatic. Nares patent. Moist oral mucosa..  Cardiovascular:  +Bradycardia. Regular rhythm. No murmurs. No rubs. No gallops. Normal S1, S2.  Respiratory: Normal respiratory effort. Clear to auscultation bilaterally. No rales. No rhonchi. No wheezing.  Abdomen: Soft. Non-tender. Non-distended. Normoactive bowel sounds.  Musculoskeletal: No  obvious deformity.  Extremities: No lower extremity edema.  Neurological: Alert & oriented x3. No slurred speech. Normal gait.  Psychiatric: Normal mood. Normal affect. Good insight. Good judgment.  Skin: Warm. Dry. No rash.    TEST RESULTS:  In July, the patient's TSH level was abnormally high at 70. Her Free T4 was also significantly low in the same month.      LABS:   274}  I have reviewed old labs below:  Lab Results   Component Value Date    WBC 4.28 07/16/2024    HGB 13.6 07/16/2024    HCT 41.5 07/16/2024     (H) 07/16/2024     07/16/2024     07/16/2024    K 3.7 07/16/2024     07/16/2024    CALCIUM 9.9 07/16/2024    PHOS 3.4 07/14/2022    CO2 26 07/16/2024    GLU 84 07/16/2024    BUN 17 07/16/2024    CREATININE 1.1 07/16/2024    ANIONGAP 13 07/16/2024    ESTGFRAFRICA >60 07/14/2022    EGFRNONAA >60 07/14/2022    PROT 8.0 07/16/2024    ALBUMIN 4.1 07/16/2024    BILITOT 0.7 07/16/2024    " ALKPHOS 35 (L) 07/16/2024    ALT 18 07/16/2024    AST 24 07/16/2024    CHOL 261 (H) 07/16/2024    TRIG 115 07/16/2024    HDL 77 (H) 07/16/2024    LDLCALC 161.0 (H) 07/16/2024    TSH 70.244 (H) 07/16/2024    HGBA1C 5.4 07/16/2024       ASSESSMENT AND PLAN:  274}  1. Bradycardia  Comments:  EKG: + sinus bradycardia 45 beats per minute, prolonged ME interval.  Marked elevation R in V4 and V5 leads concerning mod LVH.  Check echo, cardio referral  Orders:  -     TSH; Future; Expected date: 12/12/2024  -     T4, FREE; Future; Expected date: 10/17/2024  -     IN OFFICE EKG 12-LEAD (to [place])  -     rosuvastatin (CRESTOR) 10 MG tablet; Take 1 tablet (10 mg total) by mouth every evening.  Dispense: 90 tablet; Refill: 3  -     COMPREHENSIVE METABOLIC PANEL; Future; Expected date: 10/17/2024  -     Echo Saline Bubble? No; Future; Expected date: 10/17/2024  -     Ambulatory referral/consult to Cardiology; Future; Expected date: 10/17/2024    2. Hypothyroidism, unspecified type  -     TSH; Future; Expected date: 12/12/2024  -     T4, FREE; Future; Expected date: 10/17/2024  -     LIPID PANEL; Future; Expected date: 12/05/2024    3. PVD (peripheral vascular disease)  Comments:  Noted on 08/04 lower extremity ultrasound.  Recommend continuing cholesterol medication.  We will discontinue simvastatin and switch to rosuvastatin    4. Mixed hyperlipidemia  Comments:  Elevated total cholesterol levels noted on 07/16/2024 labs.  Presently on Zocor 40 mg with switch to rosuvastatin 10 mg .  Repeat lipids in 6 weeks  Orders:  -     rosuvastatin (CRESTOR) 10 MG tablet; Take 1 tablet (10 mg total) by mouth every evening.  Dispense: 90 tablet; Refill: 3  -     LIPID PANEL; Future; Expected date: 12/05/2024         Assessment & Plan    IMPRESSION:   Assessed thyroid function, noting previous abnormal results (TSH 70, low free T4) in July   Evaluated cardiovascular status due to elevated blood pressure and low heart rate (49 bpm)   Considered  potential medication interactions, particularly with simvastatin   Reviewed osteoporosis treatment and potential side effects of Fosamax   Assessed peripheral vascular disease status   Evaluated need for gabapentin   Considered adjusting diuretic based on lab results    THYROID DISORDER:   Emphasized the importance of thyroid function tests in relation to overall health and symptoms.   Discussed the potential impact of thyroid dysfunction on weight and metabolism.   Explained the relationship between blood pressure, heart rate, and thyroid function.   Continued levothyroxine (Synthroid), emphasizing importance of taking before food with water.   Ordered thyroid function tests (TSH, free T4).   Follow up after lab results are available to reassess thyroid function and medication adjustments.    HYPERLIPIDEMIA:   Discussed the rationale for changing cholesterol medication to reduce drug interactions.   Started rosuvastatin (Crestor) for cholesterol management and to reduce medication interactions.   Discontinued simvastatin (Zocor).    HYPERTENSION:   Continued blood pressure medication.    OSTEOPOROSIS:   Continued Fosamax once weekly for osteoporosis.    MEDICATIONS/SUPPLEMENTS:   Continued gabapentin.    LABS:   Ordered EKG to assess heart rhythm.   Ordered labs to check potassium levels.         Orders Placed This Encounter   Procedures    TSH    T4, FREE    LIPID PANEL    COMPREHENSIVE METABOLIC PANEL    Ambulatory referral/consult to Cardiology    IN OFFICE EKG 12-LEAD (to [place])    Echo Saline Bubble? No         This note was generated with the assistance of ambient listening technology. Verbal consent was obtained by the patient and accompanying visitor(s) for the recording of patient appointment to facilitate this note. I attest to having reviewed and edited the generated note for accuracy, though some syntax or spelling errors may persist. Please contact the author of this note for any  clarification.

## 2024-10-18 DIAGNOSIS — E03.9 HYPOTHYROIDISM, UNSPECIFIED TYPE: Primary | ICD-10-CM

## 2024-10-18 RX ORDER — LEVOTHYROXINE SODIUM 75 UG/1
75 TABLET ORAL
Qty: 90 TABLET | Refills: 3 | Status: SHIPPED | OUTPATIENT
Start: 2024-10-18 | End: 2025-10-18

## 2024-10-21 ENCOUNTER — TELEPHONE (OUTPATIENT)
Dept: FAMILY MEDICINE | Facility: CLINIC | Age: 74
End: 2024-10-21
Payer: MEDICARE

## 2024-10-21 NOTE — TELEPHONE ENCOUNTER
----- Message from Avinash Cain DO sent at 10/18/2024 12:45 PM CDT -----  Please inform the patient of the following:    Simransalima Salimajaskarankris Mary Jane    Your thyroid labs have been reviewed and they are much improved from 3 months prior.  Levels are now 14.  I do recommend an increase in levothyroxine dose to 75 mcg.  I have sent this medication to the pharmacy.  Please discontinue the 50 mcg dose. We will repeat your thyroid tests in 2 months No further changes are recommended to your medical treatment at this time.     Please contact our office if you have any further questions.    Sincerely,     Avinash Cain DO

## 2024-11-06 ENCOUNTER — OFFICE VISIT (OUTPATIENT)
Dept: CARDIOLOGY | Facility: CLINIC | Age: 74
End: 2024-11-06
Payer: MEDICARE

## 2024-11-06 VITALS
HEART RATE: 55 BPM | OXYGEN SATURATION: 98 % | SYSTOLIC BLOOD PRESSURE: 118 MMHG | RESPIRATION RATE: 16 BRPM | BODY MASS INDEX: 22.84 KG/M2 | WEIGHT: 121 LBS | DIASTOLIC BLOOD PRESSURE: 72 MMHG | HEIGHT: 61 IN

## 2024-11-06 DIAGNOSIS — E03.9 HYPOTHYROIDISM, UNSPECIFIED TYPE: ICD-10-CM

## 2024-11-06 DIAGNOSIS — R00.1 BRADYCARDIA: ICD-10-CM

## 2024-11-06 DIAGNOSIS — I10 ESSENTIAL HYPERTENSION: Primary | ICD-10-CM

## 2024-11-06 DIAGNOSIS — R94.31 ABNORMAL ELECTROCARDIOGRAM (ECG) (EKG): ICD-10-CM

## 2024-11-06 DIAGNOSIS — E78.00 PURE HYPERCHOLESTEROLEMIA: ICD-10-CM

## 2024-11-06 LAB
OHS QRS DURATION: 84 MS
OHS QTC CALCULATION: 436 MS

## 2024-11-06 PROCEDURE — 3008F BODY MASS INDEX DOCD: CPT | Mod: CPTII,S$GLB,, | Performed by: INTERNAL MEDICINE

## 2024-11-06 PROCEDURE — 99999 PR PBB SHADOW E&M-EST. PATIENT-LVL III: CPT | Mod: PBBFAC,,, | Performed by: INTERNAL MEDICINE

## 2024-11-06 PROCEDURE — 3288F FALL RISK ASSESSMENT DOCD: CPT | Mod: CPTII,S$GLB,, | Performed by: INTERNAL MEDICINE

## 2024-11-06 PROCEDURE — 3078F DIAST BP <80 MM HG: CPT | Mod: CPTII,S$GLB,, | Performed by: INTERNAL MEDICINE

## 2024-11-06 PROCEDURE — 1159F MED LIST DOCD IN RCRD: CPT | Mod: CPTII,S$GLB,, | Performed by: INTERNAL MEDICINE

## 2024-11-06 PROCEDURE — 1101F PT FALLS ASSESS-DOCD LE1/YR: CPT | Mod: CPTII,S$GLB,, | Performed by: INTERNAL MEDICINE

## 2024-11-06 PROCEDURE — 99204 OFFICE O/P NEW MOD 45 MIN: CPT | Mod: S$GLB,,, | Performed by: INTERNAL MEDICINE

## 2024-11-06 PROCEDURE — 4010F ACE/ARB THERAPY RXD/TAKEN: CPT | Mod: CPTII,S$GLB,, | Performed by: INTERNAL MEDICINE

## 2024-11-06 PROCEDURE — 3074F SYST BP LT 130 MM HG: CPT | Mod: CPTII,S$GLB,, | Performed by: INTERNAL MEDICINE

## 2024-11-06 PROCEDURE — 1160F RVW MEDS BY RX/DR IN RCRD: CPT | Mod: CPTII,S$GLB,, | Performed by: INTERNAL MEDICINE

## 2024-11-06 PROCEDURE — 3044F HG A1C LEVEL LT 7.0%: CPT | Mod: CPTII,S$GLB,, | Performed by: INTERNAL MEDICINE

## 2024-11-06 RX ORDER — LEVOTHYROXINE SODIUM 100 UG/1
100 TABLET ORAL
Qty: 90 TABLET | Refills: 3 | Status: SHIPPED | OUTPATIENT
Start: 2024-11-06 | End: 2025-11-06

## 2024-11-06 NOTE — PROGRESS NOTES
Subjective:    Patient ID:  Jonathan Hinton is a 74 y.o. female patient here for evaluation Establish Care (Pcp sent pt over for eval of bradycardia)      History of Present Illness:     Is a 74-year-old lady with history of arterial hypertension thyroid dysfunction had undergone thyroidectomy July of 2022 still has significant thyroid abnormality with elevated TSH also noted to have abnormal cardiac rhythm with the bradycardia and she was referred here for cardiac evaluation.    Patient denies having chest discomfort no arm neck or jaw pain noted and no significant palpitations identified.  She denies having any dizziness lightheadedness weakness or fainting sensation.    No swelling in the lower extremities, no symptoms of heart failure noted.        Review of patient's allergies indicates:  No Known Allergies    Past Medical History:   Diagnosis Date    Hyperlipidemia     Hypertension     Hyperthyroidism      Past Surgical History:   Procedure Laterality Date    COLONOSCOPY      THYROIDECTOMY Bilateral 7/14/2022    Procedure: THYROIDECTOMY;  Surgeon: Rafy Cannon MD;  Location: [facility];  Service: ENT;  Laterality: Bilateral;    TUBAL LIGATION      WISDOM TOOTH EXTRACTION       Social History     Tobacco Use    Smoking status: Former     Current packs/day: 0.00     Types: Cigarettes     Quit date: 6/30/2021     Years since quitting: 3.3    Smokeless tobacco: Former   Substance Use Topics    Alcohol use: Not Currently    Drug use: Never        Review of Systems   As noted in HPI in addition     Constitutional: Negative for chills, fatigue and fever.   Eyes: No double vision, No blurred vision  Neuro: No headaches, No dizziness  Respiratory: Negative for cough, shortness of breath and wheezing.    Cardiovascular: Negative for chest pain. Negative for palpitations and leg swelling.   Gastrointestinal: Negative for abdominal pain, No melena, diarrhea, nausea and vomiting.   Genitourinary: Negative for dysuria and  frequency, Negative for hematuria  Skin: Negative for bruising, Negative for edema or discoloration noted.   Endocrine: Negative for polyphagia, Negative for heat intolerance, Negative for cold intolerance  Psychiatric: Negative for depression, Negative for anxiety, Negative for memory loss  Musculoskeletal: Negative for neck pain, Negative for muscle weakness, Negative for back pain          Objective        Vitals:    11/06/24 1132   BP: 118/72   Pulse: (!) 55   Resp: 16       LIPIDS - LAST 2   Lab Results   Component Value Date    CHOL 232 (H) 10/17/2024    CHOL 261 (H) 07/16/2024    HDL 76 (H) 10/17/2024    HDL 77 (H) 07/16/2024    LDLCALC 135.4 10/17/2024    LDLCALC 161.0 (H) 07/16/2024    TRIG 103 10/17/2024    TRIG 115 07/16/2024    CHOLHDL 32.8 10/17/2024    CHOLHDL 29.5 07/16/2024       CBC - LAST 2  Lab Results   Component Value Date    WBC 4.28 07/16/2024    WBC 3.28 (L) 11/07/2023    RBC 4.11 07/16/2024    RBC 4.16 11/07/2023    HGB 13.6 07/16/2024    HGB 13.9 11/07/2023    HCT 41.5 07/16/2024    HCT 41.7 11/07/2023     (H) 07/16/2024     (H) 11/07/2023    MCH 33.1 (H) 07/16/2024    MCH 33.4 (H) 11/07/2023    MCHC 32.8 07/16/2024    MCHC 33.3 11/07/2023    RDW 12.3 07/16/2024    RDW 11.7 11/07/2023     07/16/2024     11/07/2023    MPV 10.5 07/16/2024    MPV 10.5 11/07/2023    GRAN 1.3 (L) 11/07/2023    GRAN 40.9 11/07/2023    LYMPH 1.5 11/07/2023    LYMPH 44.8 11/07/2023    MONO 0.4 11/07/2023    MONO 10.7 11/07/2023    BASO 0.03 11/07/2023    BASO 0.02 06/28/2023    NRBC 0 11/07/2023    NRBC 0 06/28/2023       CHEMISTRY & LIVER FUNCTION - LAST 2  Lab Results   Component Value Date     10/17/2024     07/16/2024    K 4.6 10/17/2024    K 3.7 07/16/2024     10/17/2024     07/16/2024    CO2 23 10/17/2024    CO2 26 07/16/2024    ANIONGAP 17 (H) 10/17/2024    ANIONGAP 13 07/16/2024    BUN 18 10/17/2024    BUN 17 07/16/2024    CREATININE 1.0 10/17/2024     "CREATININE 1.1 07/16/2024    GLU 87 10/17/2024    GLU 84 07/16/2024    CALCIUM 10.3 10/17/2024    CALCIUM 9.9 07/16/2024    MG 1.8 07/14/2022    MG 2.0 05/03/2019    ALBUMIN 4.2 10/17/2024    ALBUMIN 4.1 07/16/2024    PROT 8.5 (H) 10/17/2024    PROT 8.0 07/16/2024    ALKPHOS 43 10/17/2024    ALKPHOS 35 (L) 07/16/2024    ALT 17 10/17/2024    ALT 18 07/16/2024    AST 22 10/17/2024    AST 24 07/16/2024    BILITOT 0.4 10/17/2024    BILITOT 0.7 07/16/2024        CARDIAC PROFILE - LAST 2  No results found for: "BNP", "CPK", "CPKMB", "LDH", "TROPONINI", "TROPONINIHS"     COAGULATION - LAST 2  No results found for: "LABPT", "INR", "APTT"    ENDOCRINE & PSA - LAST 2  Lab Results   Component Value Date    HGBA1C 5.4 07/16/2024    HGBA1C 5.1 06/28/2023    TSH 14.864 (H) 10/17/2024    TSH 70.244 (H) 07/16/2024        TSH  Order: [id number]  Status: Final result       Visible to patient: No (inaccessible in MyChart)       Next appt: 11/08/2024 at 10:15 AM in Cardiology ([facility] OP ECHO 1)       Dx: Bradycardia; Hypothyroidism, unspecif...    1 Result Note       1 Follow-up Encounter             Component Ref Range & Units 2 wk ago  (10/17/24) 3 mo ago  (7/16/24) 1 yr ago  (11/7/23) 1 yr ago  (6/28/23) 1 yr ago  (6/19/23) 1 yr ago  (4/28/23) 1 yr ago  (1/5/23)   TSH 0.400 - 4.000 uIU/mL 14.864 High  70.244 High  2.529 0.585 0.654 0.097 Low  0.184 Low    Resulting Agency  [facility]          ECHOCARDIOGRAM RESULTS  No results found for this or any previous visit.      CURRENT/PREVIOUS VISIT EKG  Results for orders placed or performed during the hospital encounter of 07/14/22   EKG 12-lead    Collection Time: 07/14/22 11:14 AM    Narrative    Test Reason : E05.90,Z01.818,    Vent. Rate : 043 BPM     Atrial Rate : 043 BPM     P-R Int : 172 ms          QRS Dur : 088 ms      QT Int : 480 ms       P-R-T Axes : -03 -01 013 degrees     QTc Int : 405 ms    Marked sinus bradycardia  Minimal voltage criteria for LVH, " may be normal variant ( R in aVL )  Abnormal ECG  No previous ECGs available  Confirmed by Dania QUINTANA, Filiberto ([id number]) on 7/15/2022 11:16:26 AM    Referred By: LOURDES HUGHES           Confirmed By:Filiberto Najera MD     No valid procedures specified.   No results found for this or any previous visit.    No valid procedures specified.          PREVIOUS STRESS TEST              PREVIOUS ANGIOGRAM        PHYSICAL EXAM    GENERAL: well built, well nourished, well-developed in no apparent distress alert and oriented.   HEENT: Normocephalic. Pupils normal and conjunctivae normal.  Mucous membranes normal, no cyanosis or icterus, trachea central,no pallor or icterus is noted..   NECK: No JVD. No bruit..   THYROID: Thyroid not enlarged. No nodules present..   CARDIAC: Regular rate and rhythm. S1 is normal.S2 is normal.No gallops, clicks or murmurs noted at this time.  CHEST ANATOMY: normal.   LUNGS: Clear to auscultation. No wheezing or rhonchi..   ABDOMEN: Soft no masses or organomegaly.  No abdomen pulsations or bruits.  Normal bowel sounds. No pulsations and no masses felt, No guarding or rebound.   EXTREMITIES: No cyanosis, clubbing or edema noted at this time., no calf tenderness bilaterally.   PERIPHERAL VASCULAR SYSTEM: Good palpable distal pulses.   CENTRAL NERVOUS SYSTEM: No focal motor or sensory deficits noted.   SKIN: Skin without lesions, moist, well perfused.   MUSCLE STRENGTH & TONE: No noteable weakness, atrophy or abnormal movement.     I HAVE REVIEWED :    The vital signs, nurses notes, and all the pertinent radiology and labs.    11/06/2024 EKG shows sinus bradycardia with isolated atrial premature complexes in the pattern of bigeminy otherwise no acute changes noted.    Current Outpatient Medications   Medication Instructions    alendronate (FOSAMAX) 70 MG tablet Take 1 tablet by mouth once a week    diclofenac (VOLTAREN) 75 mg, Oral, 2 times daily    gabapentin (NEURONTIN) 300 mg, Oral, Nightly     levothyroxine (SYNTHROID) 100 mcg, Oral, Before breakfast    lisinopriL-hydrochlorothiazide (PRINZIDE,ZESTORETIC) 10-12.5 mg per tablet 1 tablet, Oral    rosuvastatin (CRESTOR) 10 mg, Oral, Nightly          Assessment & Plan     1. Essential hypertension  Assessment & Plan:  Blood pressure appears reasonably stable at 1 18/72 mm Hg currently on lisinopril hydrochlorothiazide 10/12.5 mg once daily maintain the same.    Orders:  -     IN OFFICE EKG 12-LEAD (to [facility])  -     Echo; Future; Expected date: 11/06/2024  -     Exercise Stress - EKG; Future  -     Cardiac Monitor - 3-15 Day Adult ([facility] Only); Future; Expected date: 11/06/2024    2. Bradycardia  Comments:  EKG: + sinus bradycardia 45 beats per minute, prolonged NE interval.  Marked elevation R in V4 and V5 leads concerning mod LVH.  Check echo, cardio referral  Overview:  EKG: + sinus bradycardia 45 beats per minute, prolonged NE interval.  Marked elevation R in V4 and V5 leads concerning mod LVH.  Check echo, cardio referral    Assessment & Plan:  Patient was sinus bradycardia with atrial bigeminy and she appears to be doing reasonably well.  Patient remains totally asymptomatic  Recommend the following   1. Her TSH is elevated recommend increasing the levothyroxine 100 mcg a day  2. Obtain an echocardiogram to evaluate for any structural abnormalities  3. Obtain symptom limited exercise stress test to evaluate for appropriate chronotropic response  4. Has been day event monitor  5. Maintain on magnesium oxide 400 mg a day       Orders:  -     Ambulatory referral/consult to Cardiology  -     IN OFFICE EKG 12-LEAD (to [facility])  -     Echo; Future; Expected date: 11/06/2024  -     Exercise Stress - EKG; Future  -     Cardiac Monitor - 3-15 Day Adult (Cupid Only); Future; Expected date: 11/06/2024    3. Pure hypercholesterolemia  Assessment & Plan:  Continue on Crestor 10 mg nightly maintain low-salt low-fat diet      4. Hypothyroidism, unspecified type  -      levothyroxine (SYNTHROID) 100 MCG tablet; Take 1 tablet (100 mcg total) by mouth before breakfast.  Dispense: 90 tablet; Refill: 3    5. Abnormal electrocardiogram (ECG) (EKG)  -     Exercise Stress - EKG; Future  - evaluate for chronotropic response in view of sustained bradycardia           Follow up in about 6 weeks (around 12/18/2024).

## 2024-11-06 NOTE — ASSESSMENT & PLAN NOTE
Blood pressure appears reasonably stable at 1 18/72 mm Hg currently on lisinopril hydrochlorothiazide 10/12.5 mg once daily maintain the same.

## 2024-11-06 NOTE — ASSESSMENT & PLAN NOTE
Patient was sinus bradycardia with atrial bigeminy and she appears to be doing reasonably well.  Patient remains totally asymptomatic  Recommend the following   1. Her TSH is elevated recommend increasing the levothyroxine 100 mcg a day  2. Obtain an echocardiogram to evaluate for any structural abnormalities  3. Obtain symptom limited exercise stress test to evaluate for appropriate chronotropic response  4. Has been day event monitor  5. Maintain on magnesium oxide 400 mg a day

## 2024-11-18 ENCOUNTER — TELEPHONE (OUTPATIENT)
Dept: CARDIOLOGY | Facility: HOSPITAL | Age: 74
End: 2024-11-18

## 2024-11-18 NOTE — TELEPHONE ENCOUNTER
Patient advised, test will be at [facility] ([address]  Will need to register on the first floor at the main entrance.  Patient advised that arrival time is 09:30.  Patient advised, may take her medications prior to testing if you need to. Advised if she needs to eat to take her medications, please keep it light, like toast and juice.    Patient advised to avoid all caffeine 12 hours prior to testing.  This includes decaf tea and coffee.    Wear comfortable clothing.  No lotions, oils, or powders to the upper chest area. May wear deodorant.    Patient verbalizes understanding of instructions.

## 2024-11-19 ENCOUNTER — HOSPITAL ENCOUNTER (OUTPATIENT)
Dept: CARDIOLOGY | Facility: HOSPITAL | Age: 74
Discharge: HOME | End: 2024-11-19
Attending: INTERNAL MEDICINE
Payer: MEDICARE

## 2024-11-19 VITALS — BODY MASS INDEX: 22.84 KG/M2 | WEIGHT: 121 LBS | HEIGHT: 61 IN

## 2024-11-19 DIAGNOSIS — R00.1 BRADYCARDIA: ICD-10-CM

## 2024-11-19 DIAGNOSIS — I10 ESSENTIAL HYPERTENSION: ICD-10-CM

## 2024-11-19 DIAGNOSIS — R94.31 ABNORMAL ELECTROCARDIOGRAM (ECG) (EKG): ICD-10-CM

## 2024-11-19 LAB
AORTIC ROOT ANNULUS: 3.2 CM
AORTIC VALVE CUSP SEPERATION: 1.9 CM
APICAL FOUR CHAMBER EJECTION FRACTION: 62 %
AV INDEX (PROSTH): 0.87
AV MEAN GRADIENT: 3 MMHG
AV PEAK GRADIENT: 4.8 MMHG
AV REGURGITATION PRESSURE HALF TIME: 656 MS
AV VALVE AREA BY VELOCITY RATIO: 3.1 CM²
AV VALVE AREA: 2.7 CM²
AV VELOCITY RATIO: 1
BSA FOR ECHO PROCEDURE: 1.54 M2
CV ECHO LV RWT: 0.4 CM
CV STRESS BASE HR: 49 BPM
DIASTOLIC BLOOD PRESSURE: 82 MMHG
DOP CALC AO PEAK VEL: 1.1 M/S
DOP CALC AO VTI: 27.8 CM
DOP CALC LVOT AREA: 3.1 CM2
DOP CALC LVOT DIAMETER: 2 CM
DOP CALC LVOT PEAK VEL: 1.1 M/S
DOP CALC LVOT STROKE VOLUME: 76 CM3
DOP CALCLVOT PEAK VEL VTI: 24.2 CM
E WAVE DECELERATION TIME: 237 MSEC
E/A RATIO: 0.7
E/E' RATIO: 8.43 M/S
ECHO LV POSTERIOR WALL: 0.8 CM (ref 0.6–1.1)
FRACTIONAL SHORTENING: 32.5 % (ref 28–44)
INTERVENTRICULAR SEPTUM: 1.2 CM (ref 0.6–1.1)
IVRT: 90 MSEC
LEFT ATRIUM SIZE: 3.6 CM
LEFT INTERNAL DIMENSION IN SYSTOLE: 2.7 CM (ref 2.1–4)
LEFT VENTRICLE DIASTOLIC VOLUME INDEX: 44.41 ML/M2
LEFT VENTRICLE DIASTOLIC VOLUME: 67.94 ML
LEFT VENTRICLE END DIASTOLIC VOLUME APICAL 4 CHAMBER: 54.3 ML
LEFT VENTRICLE MASS INDEX: 83 G/M2
LEFT VENTRICLE SYSTOLIC VOLUME INDEX: 17.5 ML/M2
LEFT VENTRICLE SYSTOLIC VOLUME: 26.77 ML
LEFT VENTRICULAR INTERNAL DIMENSION IN DIASTOLE: 4 CM (ref 3.5–6)
LEFT VENTRICULAR MASS: 127.1 G
LV LATERAL E/E' RATIO: 6.56 M/S
LV SEPTAL E/E' RATIO: 11.8 M/S
LVED V (TEICH): 67.94 ML
LVES V (TEICH): 26.77 ML
LVOT MG: 2 MMHG
LVOT MV: 0.66 CM/S
MV PEAK A VEL: 0.84 M/S
MV PEAK E VEL: 0.59 M/S
MV STENOSIS PRESSURE HALF TIME: 57 MS
MV VALVE AREA P 1/2 METHOD: 3.86 CM2
OHS CV CPX 1 MINUTE RECOVERY HEART RATE: 55 BPM
OHS CV CPX 85 PERCENT MAX PREDICTED HEART RATE MALE: 125
OHS CV CPX ESTIMATED METS: 5
OHS CV CPX MAX PREDICTED HEART RATE: 147
OHS CV CPX PATIENT IS FEMALE: 1
OHS CV CPX PATIENT IS MALE: 0
OHS CV CPX PEAK DIASTOLIC BLOOD PRESSURE: 86 MMHG
OHS CV CPX PEAK HEAR RATE: 94 BPM
OHS CV CPX PEAK RATE PRESSURE PRODUCT: NORMAL
OHS CV CPX PEAK SYSTOLIC BLOOD PRESSURE: 196 MMHG
OHS CV CPX PERCENT MAX PREDICTED HEART RATE ACHIEVED: 66
OHS CV CPX RATE PRESSURE PRODUCT PRESENTING: 7056
PISA AR MAX VEL: 3.84 M/S
PISA TR MAX VEL: 2.16 M/S
RA PRESSURE ESTIMATED: 3 MMHG
RIGHT VENTRICULAR END-DIASTOLIC DIMENSION: 1.96 CM
RV TB RVSP: 5 MMHG
STRESS ECHO POST EXERCISE DUR MIN: 3 MINUTES
STRESS ECHO POST EXERCISE DUR SEC: 45 SECONDS
SYSTOLIC BLOOD PRESSURE: 144 MMHG
TDI LATERAL: 0.09 M/S
TDI SEPTAL: 0.05 M/S
TDI: 0.07 M/S
TR MAX PG: 19 MMHG
TV REST PULMONARY ARTERY PRESSURE: 22 MMHG
Z-SCORE OF LEFT VENTRICULAR DIMENSION IN END DIASTOLE: -1.1
Z-SCORE OF LEFT VENTRICULAR DIMENSION IN END SYSTOLE: -0.2

## 2024-11-19 PROCEDURE — 93306 TTE W/DOPPLER COMPLETE: CPT | Mod: 26,,, | Performed by: INTERNAL MEDICINE

## 2024-11-19 PROCEDURE — 93306 TTE W/DOPPLER COMPLETE: CPT

## 2024-11-19 PROCEDURE — 93018 CV STRESS TEST I&R ONLY: CPT | Mod: ,,, | Performed by: INTERNAL MEDICINE

## 2024-11-19 PROCEDURE — 93017 CV STRESS TEST TRACING ONLY: CPT

## 2024-11-19 PROCEDURE — 93016 CV STRESS TEST SUPVJ ONLY: CPT | Mod: ,,,

## 2024-12-09 ENCOUNTER — TELEPHONE (OUTPATIENT)
Dept: CARDIOLOGY | Facility: CLINIC | Age: 74
End: 2024-12-09
Payer: MEDICARE

## 2024-12-09 NOTE — TELEPHONE ENCOUNTER
----- Message from Nitza sent at 12/9/2024 10:27 AM CST -----  Regarding: advise  Contact: pt  Type: Needs Medical Advice  Who Called:  pt  Symptoms (please be specific):    How long has patient had these symptoms:    Pharmacy name and phone #:    Best Call Back Number: [phone]    Additional Information: needs to know what/ when she's suppose to come in and have on her chest call to advise   No

## 2024-12-18 ENCOUNTER — OFFICE VISIT (OUTPATIENT)
Dept: CARDIOLOGY | Facility: CLINIC | Age: 74
End: 2024-12-18
Payer: MEDICARE

## 2024-12-18 ENCOUNTER — HOSPITAL ENCOUNTER (OUTPATIENT)
Dept: CARDIOLOGY | Facility: CLINIC | Age: 74
Discharge: HOME | End: 2024-12-18
Attending: INTERNAL MEDICINE
Payer: MEDICARE

## 2024-12-18 VITALS
HEIGHT: 61 IN | WEIGHT: 122 LBS | SYSTOLIC BLOOD PRESSURE: 118 MMHG | DIASTOLIC BLOOD PRESSURE: 80 MMHG | HEART RATE: 45 BPM | BODY MASS INDEX: 23.03 KG/M2 | RESPIRATION RATE: 16 BRPM | OXYGEN SATURATION: 99 %

## 2024-12-18 DIAGNOSIS — R93.1 ABNORMAL FINDINGS ON DIAGNOSTIC IMAGING OF HEART AND CORONARY CIRCULATION: ICD-10-CM

## 2024-12-18 DIAGNOSIS — I10 ESSENTIAL HYPERTENSION: Primary | ICD-10-CM

## 2024-12-18 DIAGNOSIS — R94.39 ABNORMAL CARDIOVASCULAR STRESS TEST: ICD-10-CM

## 2024-12-18 DIAGNOSIS — I10 ESSENTIAL HYPERTENSION: ICD-10-CM

## 2024-12-18 DIAGNOSIS — E78.2 MIXED HYPERLIPIDEMIA: ICD-10-CM

## 2024-12-18 DIAGNOSIS — E07.9 THYROID DYSFUNCTION: ICD-10-CM

## 2024-12-18 DIAGNOSIS — R00.1 BRADYCARDIA: ICD-10-CM

## 2024-12-18 PROCEDURE — 3288F FALL RISK ASSESSMENT DOCD: CPT | Mod: CPTII,S$GLB,, | Performed by: INTERNAL MEDICINE

## 2024-12-18 PROCEDURE — 3044F HG A1C LEVEL LT 7.0%: CPT | Mod: CPTII,S$GLB,, | Performed by: INTERNAL MEDICINE

## 2024-12-18 PROCEDURE — 3074F SYST BP LT 130 MM HG: CPT | Mod: CPTII,S$GLB,, | Performed by: INTERNAL MEDICINE

## 2024-12-18 PROCEDURE — 3008F BODY MASS INDEX DOCD: CPT | Mod: CPTII,S$GLB,, | Performed by: INTERNAL MEDICINE

## 2024-12-18 PROCEDURE — 1101F PT FALLS ASSESS-DOCD LE1/YR: CPT | Mod: CPTII,S$GLB,, | Performed by: INTERNAL MEDICINE

## 2024-12-18 PROCEDURE — 4010F ACE/ARB THERAPY RXD/TAKEN: CPT | Mod: CPTII,S$GLB,, | Performed by: INTERNAL MEDICINE

## 2024-12-18 PROCEDURE — 3079F DIAST BP 80-89 MM HG: CPT | Mod: CPTII,S$GLB,, | Performed by: INTERNAL MEDICINE

## 2024-12-18 PROCEDURE — 99214 OFFICE O/P EST MOD 30 MIN: CPT | Mod: S$GLB,,, | Performed by: INTERNAL MEDICINE

## 2024-12-18 PROCEDURE — 1159F MED LIST DOCD IN RCRD: CPT | Mod: CPTII,S$GLB,, | Performed by: INTERNAL MEDICINE

## 2024-12-18 PROCEDURE — 99999 PR PBB SHADOW E&M-EST. PATIENT-LVL III: CPT | Mod: PBBFAC,,, | Performed by: INTERNAL MEDICINE

## 2024-12-18 PROCEDURE — 1160F RVW MEDS BY RX/DR IN RCRD: CPT | Mod: CPTII,S$GLB,, | Performed by: INTERNAL MEDICINE

## 2024-12-18 RX ORDER — ROSUVASTATIN CALCIUM 20 MG/1
20 TABLET, COATED ORAL NIGHTLY
Qty: 90 TABLET | Refills: 3 | Status: SHIPPED | OUTPATIENT
Start: 2024-12-18 | End: 2025-12-18

## 2024-12-18 NOTE — ASSESSMENT & PLAN NOTE
Blood pressure is fairly well controlled on the present regimen 118/80 mm Hg continue on lisinopril hydrochlorothiazide 10/12.5 mg daily.

## 2024-12-18 NOTE — ASSESSMENT & PLAN NOTE
Recommend myocardial perfusion study imaging with Lexiscan induced hyperemia to identify extent of coronary insufficiency

## 2024-12-18 NOTE — ASSESSMENT & PLAN NOTE
Significant thyroid dysfunction TSH is still at 14.86 she is scheduled to see endocrinologist next month.  Will defer this for time being although this could be affecting her lipid levels and other comorbidities

## 2024-12-18 NOTE — ASSESSMENT & PLAN NOTE
Hyperlipidemia continue on Crestor but increase the dose to 20 mg nightly low-fat low-cholesterol diet

## 2024-12-18 NOTE — PROGRESS NOTES
Subjective:    Patient ID:  Jonathan Hinton is a 74 y.o. female patient here for evaluation Results (Stress and echo results )      History of Present Illness:   Patient is a 74-year-old with history of arterial hypertension dyslipidemia hypothyroidism seeking follow-up evaluation after preliminary workup.  She had completed a regular stress test however abnormal EKG noted influenced by resting abnormalities did not achieve target heart rate.  Recommended her to have a pharmacologic stress test because of that echocardiogram shows preserved LV function mild concentric hypertrophy mild-to-moderate aortic insufficiency mild mitral insufficiency noted.  Patient is doing reasonably well she is scheduled to have Zio monitor placed today.  Overall effort capacity is decent.  She is also noted as significant dyslipidemia LDL cholesterol up to 135 although this has an improvement from 161.  Her TSH still remains abnormal at 14.86        Review of patient's allergies indicates:  No Known Allergies    Past Medical History:   Diagnosis Date    Hyperlipidemia     Hypertension     Hyperthyroidism      Past Surgical History:   Procedure Laterality Date    COLONOSCOPY      THYROIDECTOMY Bilateral 7/14/2022    Procedure: THYROIDECTOMY;  Surgeon: Rafy Cannon MD;  [facility];  Service: ENT;  Laterality: Bilateral;    TUBAL LIGATION      WISDOM TOOTH EXTRACTION       Social History     Tobacco Use    Smoking status: Former     Current packs/day: 0.00     Types: Cigarettes     Quit date: 6/30/2021     Years since quitting: 3.4    Smokeless tobacco: Former   Substance Use Topics    Alcohol use: Not Currently    Drug use: Never        Review of Systems:    As noted in HPI in addition      REVIEW OF SYSTEMS  CARDIOVASCULAR: No recent chest pain, palpitations, arm, neck, or jaw pain  RESPIRATORY: No recent fever, cough chills, SOB or congestion  : No blood in the urine  GI: No Nausea, vomiting, constipation, diarrhea, blood,  or reflux.  MUSCULOSKELETAL: No myalgias  NEURO: No lightheadedness or dizziness  EYES: No Double vision, blurry, vision or headache              Objective        Vitals:    12/18/24 1007   BP: 118/80   Pulse: (!) 45   Resp: 16       LIPIDS - LAST 2   Lab Results   Component Value Date    CHOL 232 (H) 10/17/2024    CHOL 261 (H) 07/16/2024    HDL 76 (H) 10/17/2024    HDL 77 (H) 07/16/2024    LDLCALC 135.4 10/17/2024    LDLCALC 161.0 (H) 07/16/2024    TRIG 103 10/17/2024    TRIG 115 07/16/2024    CHOLHDL 32.8 10/17/2024    CHOLHDL 29.5 07/16/2024       CBC - LAST 2  Lab Results   Component Value Date    WBC 4.28 07/16/2024    WBC 3.28 (L) 11/07/2023    RBC 4.11 07/16/2024    RBC 4.16 11/07/2023    HGB 13.6 07/16/2024    HGB 13.9 11/07/2023    HCT 41.5 07/16/2024    HCT 41.7 11/07/2023     (H) 07/16/2024     (H) 11/07/2023    MCH 33.1 (H) 07/16/2024    MCH 33.4 (H) 11/07/2023    MCHC 32.8 07/16/2024    MCHC 33.3 11/07/2023    RDW 12.3 07/16/2024    RDW 11.7 11/07/2023     07/16/2024     11/07/2023    MPV 10.5 07/16/2024    MPV 10.5 11/07/2023    GRAN 1.3 (L) 11/07/2023    GRAN 40.9 11/07/2023    LYMPH 1.5 11/07/2023    LYMPH 44.8 11/07/2023    MONO 0.4 11/07/2023    MONO 10.7 11/07/2023    BASO 0.03 11/07/2023    BASO 0.02 06/28/2023    NRBC 0 11/07/2023    NRBC 0 06/28/2023       CHEMISTRY & LIVER FUNCTION - LAST 2  Lab Results   Component Value Date     10/17/2024     07/16/2024    K 4.6 10/17/2024    K 3.7 07/16/2024     10/17/2024     07/16/2024    CO2 23 10/17/2024    CO2 26 07/16/2024    ANIONGAP 17 (H) 10/17/2024    ANIONGAP 13 07/16/2024    BUN 18 10/17/2024    BUN 17 07/16/2024    CREATININE 1.0 10/17/2024    CREATININE 1.1 07/16/2024    GLU 87 10/17/2024    GLU 84 07/16/2024    CALCIUM 10.3 10/17/2024    CALCIUM 9.9 07/16/2024    MG 1.8 07/14/2022    MG 2.0 05/03/2019    ALBUMIN 4.2 10/17/2024    ALBUMIN 4.1 07/16/2024    PROT 8.5 (H) 10/17/2024    PROT 8.0  "07/16/2024    ALKPHOS 43 10/17/2024    ALKPHOS 35 (L) 07/16/2024    ALT 17 10/17/2024    ALT 18 07/16/2024    AST 22 10/17/2024    AST 24 07/16/2024    BILITOT 0.4 10/17/2024    BILITOT 0.7 07/16/2024        CARDIAC PROFILE - LAST 2  No results found for: "BNP", "CPK", "CPKMB", "LDH", "TROPONINI", "TROPONINIHS"     COAGULATION - LAST 2  No results found for: "LABPT", "INR", "APTT"    ENDOCRINE & PSA - LAST 2  Lab Results   Component Value Date    HGBA1C 5.4 07/16/2024    HGBA1C 5.1 06/28/2023    TSH 14.864 (H) 10/17/2024    TSH 70.244 (H) 07/16/2024        ECHOCARDIOGRAM RESULTS  Results for orders placed during the hospital encounter of 11/19/24    Echo    Interpretation Summary    Left Ventricle: The left ventricle is normal in size. Mildly increased wall thickness. There is mild concentric hypertrophy. Normal wall motion. There is normal systolic function with a visually estimated ejection fraction of 60 - 65%. There is normal diastolic function.    Right Ventricle: Normal right ventricular cavity size. Wall thickness is normal. Systolic function is normal.    Left Atrium: Left atrium is mildly dilated.    Aortic Valve: There is mild to moderate aortic regurgitation.    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 There is mild regurgitation with a centrally directed jet.    Pulmonic Valve: There is mild regurgitation with a centrally directed jet.    IVC/SVC: Normal venous pressure at 3 mmHg.      CURRENT/PREVIOUS VISIT EKG  Results for orders placed or performed in visit on 11/06/24   IN OFFICE EKG 12-LEAD (to [place])    Collection Time: 11/06/24 11:48 AM   Result Value Ref Range    QRS Duration 84 ms    [facility] QTC Calculation 436 ms    Narrative    Test Reason : R00.1,I10,    Vent. Rate :  56 BPM     Atrial Rate :  56 BPM     P-R Int : 180 ms          QRS Dur :  84 ms      QT Int : 452 ms       P-R-T Axes :  50  42  44 degrees    QTcB Int : 436 ms    Sinus bradycardia with Premature atrial " complexes in a pattern of bigeminy  Biventricular hypertrophy  Abnormal ECG  When compared with ECG of 17-OCT-2024 13:47,  Premature atrial complexes are now Present  Minimal criteria for Septal infarct are no longer Present  Confirmed by Chapin Kang ([id number]) on 11/23/2024 4:28:41 PM    Referred By: FINN GARCIA           Confirmed By: Chapin Kang     No valid procedures specified.   Results for orders placed during the hospital encounter of 11/19/24    Exercise Stress - EKG    Interpretation Summary    The patient exercised for 3 minutes 45 seconds on a Sergio protocol, corresponding to a functional capacity of 5METS, achieving a peak heart rate of 94 bpm, which is 66% of the age predicted maximum heart rate.    The ECG portion of the study is negative for ischemia. Sensitivity is reduced secondary to the target heart rate not being achieved.    Sensitivity is reduced secondary to the target heart rate not being achieved.    The patient reported no chest pain during the stress test.    The blood pressure response to stress was normal.    Recommend pharmacologic stress test to evaluate for any coronary insufficiency    No valid procedures specified.    PHYSICAL EXAM  CONSTITUTIONAL: Well built, well nourished in no apparent distress  NECK: no carotid bruit, no JVD  LUNGS: CTA  CHEST WALL: no tenderness  HEART: regular rate and rhythm, S1, S2 normal, soft systolic murmur   ABDOMEN: soft, non-tender; bowel sounds normal; no masses,  no organomegaly  EXTREMITIES: Extremities normal, no edema, no calf tenderness noted  NEURO: AAO X 3    I HAVE REVIEWED :    The vital signs, nurses notes, and all the pertinent radiology and labs.        Current Outpatient Medications   Medication Instructions    alendronate (FOSAMAX) 70 MG tablet Take 1 tablet by mouth once a week    diclofenac (VOLTAREN) 75 mg, Oral, 2 times daily    gabapentin (NEURONTIN) 300 mg, Oral, Nightly    levothyroxine (SYNTHROID) 100 mcg, Oral, Before  breakfast    lisinopriL-hydrochlorothiazide (PRINZIDE,ZESTORETIC) 10-12.5 mg per tablet 1 tablet, Oral    rosuvastatin (CRESTOR) 20 mg, Oral, Nightly          Assessment & Plan     1. Essential hypertension  Assessment & Plan:  Blood pressure is fairly well controlled on the present regimen 118/80 mm Hg continue on lisinopril hydrochlorothiazide 10/12.5 mg daily.    Orders:  -     Nuclear Stress - Cardiology Interpreted; Future    2. Mixed hyperlipidemia  Assessment & Plan:  Hyperlipidemia continue on Crestor but increase the dose to 20 mg nightly low-fat low-cholesterol diet    Orders:  -     rosuvastatin (CRESTOR) 20 MG tablet; Take 1 tablet (20 mg total) by mouth every evening.  Dispense: 90 tablet; Refill: 3  -     Nuclear Stress - Cardiology Interpreted; Future    3. Bradycardia  Overview:  EKG: + sinus bradycardia 45 beats per minute, prolonged UT interval.  Marked elevation R in V4 and V5 leads concerning mod LVH.  Check echo, cardio referral    Assessment & Plan:  Patient is scheduled to have event recorder placed today.  No clinical symptoms identified    Orders:  -     rosuvastatin (CRESTOR) 20 MG tablet; Take 1 tablet (20 mg total) by mouth every evening.  Dispense: 90 tablet; Refill: 3    4. Abnormal cardiovascular stress test  Assessment & Plan:  Recommend myocardial perfusion study imaging with Lexiscan induced hyperemia to identify extent of coronary insufficiency    Orders:  -     Nuclear Stress - Cardiology Interpreted; Future    5. Thyroid dysfunction  Assessment & Plan:  Significant thyroid dysfunction TSH is still at 14.86 she is scheduled to see endocrinologist next month.  Will defer this for time being although this could be affecting her lipid levels and other comorbidities      6. Bradycardia  Comments:  EKG: + sinus bradycardia 45 beats per minute, prolonged UT interval.  Marked elevation R in V4 and V5 leads concerning mod LVH.  Check echo, cardio referral  Overview:  EKG: + sinus  bradycardia 45 beats per minute, prolonged W[place] interval.  Marked elevation R in V4 and V5 leads concerning mod LVH.  Check echo, cardio referral    Assessment & Plan:  Patient is scheduled to have event recorder placed today.  No clinical symptoms identified    Orders:  -     rosuvastatin (CRESTOR) 20 MG tablet; Take 1 tablet (20 mg total) by mouth every evening.  Dispense: 90 tablet; Refill: 3    7. Mixed hyperlipidemia  Comments:  Elevated total cholesterol levels noted on 07/16/2024 labs.  Presently on Zocor 40 mg with switch to rosuvastatin 10 mg .  Repeat lipids in 6 weeks  Assessment & Plan:  Hyperlipidemia continue on Crestor but increase the dose to 20 mg nightly low-fat low-cholesterol diet    Orders:  -     rosuvastatin (CRESTOR) 20 MG tablet; Take 1 tablet (20 mg total) by mouth every evening.  Dispense: 90 tablet; Refill: 3  -     Nuclear Stress - Cardiology Interpreted; Future    8. Abnormal findings on diagnostic imaging of heart and coronary circulation  -     Nuclear Stress - Cardiology Interpreted; Future          No follow-ups on file.

## 2025-01-08 ENCOUNTER — LAB VISIT (OUTPATIENT)
Dept: LAB | Facility: HOSPITAL | Age: 75
End: 2025-01-08
Attending: PHYSICIAN ASSISTANT
Payer: MEDICARE

## 2025-01-08 DIAGNOSIS — E89.0 POST-SURGICAL HYPOTHYROIDISM: ICD-10-CM

## 2025-01-08 DIAGNOSIS — E53.8 VITAMIN B 12 DEFICIENCY: ICD-10-CM

## 2025-01-08 DIAGNOSIS — E55.9 HYPOVITAMINOSIS D: ICD-10-CM

## 2025-01-08 LAB
25(OH)D3+25(OH)D2 SERPL-MCNC: 59 NG/ML (ref 30–96)
ALBUMIN SERPL BCP-MCNC: 4 G/DL (ref 3.5–5.2)
ALP SERPL-CCNC: 46 U/L (ref 40–150)
ALT SERPL W/O P-5'-P-CCNC: 15 U/L (ref 10–44)
ANION GAP SERPL CALC-SCNC: 11 MMOL/L (ref 8–16)
AST SERPL-CCNC: 19 U/L (ref 10–40)
BILIRUB SERPL-MCNC: 0.5 MG/DL (ref 0.1–1)
BUN SERPL-MCNC: 20 MG/DL (ref 8–23)
CALCIUM SERPL-MCNC: 9.8 MG/DL (ref 8.7–10.5)
CHLORIDE SERPL-SCNC: 104 MMOL/L (ref 95–110)
CO2 SERPL-SCNC: 28 MMOL/L (ref 23–29)
CREAT SERPL-MCNC: 1.2 MG/DL (ref 0.5–1.4)
EST. GFR  (NO RACE VARIABLE): 47.8 ML/MIN/1.73 M^2
GLUCOSE SERPL-MCNC: 79 MG/DL (ref 70–110)
POTASSIUM SERPL-SCNC: 3.9 MMOL/L (ref 3.5–5.1)
PROT SERPL-MCNC: 8.3 G/DL (ref 6–8.4)
SODIUM SERPL-SCNC: 143 MMOL/L (ref 136–145)
T4 FREE SERPL-MCNC: 0.63 NG/DL (ref 0.71–1.51)
TSH SERPL DL<=0.005 MIU/L-ACNC: 61.83 UIU/ML (ref 0.4–4)
VIT B12 SERPL-MCNC: 841 PG/ML (ref 210–950)

## 2025-01-08 PROCEDURE — 80053 COMPREHEN METABOLIC PANEL: CPT | Performed by: PHYSICIAN ASSISTANT

## 2025-01-08 PROCEDURE — 36415 COLL VENOUS BLD VENIPUNCTURE: CPT | Mod: PO | Performed by: PHYSICIAN ASSISTANT

## 2025-01-08 PROCEDURE — 82607 VITAMIN B-12: CPT | Performed by: PHYSICIAN ASSISTANT

## 2025-01-08 PROCEDURE — 84443 ASSAY THYROID STIM HORMONE: CPT | Performed by: PHYSICIAN ASSISTANT

## 2025-01-08 PROCEDURE — 84439 ASSAY OF FREE THYROXINE: CPT | Performed by: PHYSICIAN ASSISTANT

## 2025-01-08 PROCEDURE — 82306 VITAMIN D 25 HYDROXY: CPT | Performed by: PHYSICIAN ASSISTANT

## 2025-01-10 ENCOUNTER — TELEPHONE (OUTPATIENT)
Dept: CARDIOLOGY | Facility: HOSPITAL | Age: 75
End: 2025-01-10

## 2025-01-10 NOTE — TELEPHONE ENCOUNTER
Patient advised, test will be at [facility] ([address] [facility]).  Will need to register on the first floor at the main entrance.  Patient advised that arrival time is 07:10.  Patient advised that they may be here about 3.5-4 hours, and may want to bring something to occupy their time, as there will be periods of waiting.    Patient advised, may take her medications prior to testing if you need to. Advised if food is needed to take medications, please keep it light, like toast and juice.    Patient advised to avoid all caffeine 12 hours prior to testing.  This includes chocolate, tea and decaf coffee.    Will provide peanut butter crackers for a snack after stress test.  If patient would prefer something else, please bring a snack from home.    Wear comfortable clothing.  No lotions, oils, or powders to the upper chest area. May wear deodorant.    No metal jewelry, buttons, or zippers to the upper body.  Patient verbalizes understanding of instructions.

## 2025-01-13 ENCOUNTER — HOSPITAL ENCOUNTER (OUTPATIENT)
Dept: CARDIOLOGY | Facility: HOSPITAL | Age: 75
Discharge: HOME | End: 2025-01-13
Attending: INTERNAL MEDICINE
Payer: MEDICARE

## 2025-01-13 ENCOUNTER — HOSPITAL ENCOUNTER (OUTPATIENT)
Dept: RADIOLOGY | Facility: HOSPITAL | Age: 75
Discharge: HOME | End: 2025-01-13
Attending: INTERNAL MEDICINE
Payer: MEDICARE

## 2025-01-13 DIAGNOSIS — E78.2 MIXED HYPERLIPIDEMIA: ICD-10-CM

## 2025-01-13 DIAGNOSIS — I10 ESSENTIAL HYPERTENSION: ICD-10-CM

## 2025-01-13 DIAGNOSIS — R94.39 ABNORMAL CARDIOVASCULAR STRESS TEST: ICD-10-CM

## 2025-01-13 DIAGNOSIS — R93.1 ABNORMAL FINDINGS ON DIAGNOSTIC IMAGING OF HEART AND CORONARY CIRCULATION: ICD-10-CM

## 2025-01-13 LAB
CV PHARM DOSE: 0.4 MG
CV STRESS BASE HR: 46 BPM
DIASTOLIC BLOOD PRESSURE: 74 MMHG
EJECTION FRACTION- HIGH: 65 %
END DIASTOLIC INDEX-HIGH: 153 ML/M2
END DIASTOLIC INDEX-LOW: 93 ML/M2
END SYSTOLIC INDEX-HIGH: 71 ML/M2
END SYSTOLIC INDEX-LOW: 31 ML/M2
NUC REST DIASTOLIC VOLUME INDEX: 58
NUC REST EJECTION FRACTION: 67
NUC REST SYSTOLIC VOLUME INDEX: 19
NUC STRESS DIASTOLIC VOLUME INDEX: 54
NUC STRESS EJECTION FRACTION: 69 %
NUC STRESS SYSTOLIC VOLUME INDEX: 17
OHS CV CPX 1 MINUTE RECOVERY HEART RATE: 72 BPM
OHS CV CPX 85 PERCENT MAX PREDICTED HEART RATE MALE: 125
OHS CV CPX MAX PREDICTED HEART RATE: 147
OHS CV CPX PATIENT IS FEMALE: 1
OHS CV CPX PATIENT IS MALE: 0
OHS CV CPX PEAK DIASTOLIC BLOOD PRESSURE: 73 MMHG
OHS CV CPX PEAK HEAR RATE: 72 BPM
OHS CV CPX PEAK RATE PRESSURE PRODUCT: NORMAL
OHS CV CPX PEAK SYSTOLIC BLOOD PRESSURE: 172 MMHG
OHS CV CPX PERCENT MAX PREDICTED HEART RATE ACHIEVED: 51
OHS CV CPX RATE PRESSURE PRODUCT PRESENTING: 8418
OHS CV INITIAL DOSE: 12 MCG/KG/MIN
OHS CV PEAK DOSE: 25.5 MCG/KG/MIN
RETIRED EF AND QEF - SEE NOTES: 53 %
SYSTOLIC BLOOD PRESSURE: 183 MMHG

## 2025-01-13 PROCEDURE — 78452 HT MUSCLE IMAGE SPECT MULT: CPT | Mod: 26,,, | Performed by: INTERNAL MEDICINE

## 2025-01-13 PROCEDURE — 63600175 PHARM REV CODE 636 W HCPCS: Performed by: INTERNAL MEDICINE

## 2025-01-13 PROCEDURE — 93018 CV STRESS TEST I&R ONLY: CPT | Mod: ,,, | Performed by: INTERNAL MEDICINE

## 2025-01-13 PROCEDURE — A9502 TC99M TETROFOSMIN: HCPCS | Performed by: INTERNAL MEDICINE

## 2025-01-13 PROCEDURE — 78452 HT MUSCLE IMAGE SPECT MULT: CPT

## 2025-01-13 PROCEDURE — 93016 CV STRESS TEST SUPVJ ONLY: CPT | Mod: ,,,

## 2025-01-13 RX ORDER — REGADENOSON 0.08 MG/ML
0.4 INJECTION, SOLUTION INTRAVENOUS
Status: COMPLETED | OUTPATIENT
Start: 2025-01-13 | End: 2025-01-13

## 2025-01-13 RX ADMIN — REGADENOSON 0.4 MG: 0.08 INJECTION, SOLUTION INTRAVENOUS at 08:01

## 2025-01-13 RX ADMIN — TETROFOSMIN 12 MILLICURIE: 1.38 INJECTION, POWDER, LYOPHILIZED, FOR SOLUTION INTRAVENOUS at 07:01

## 2025-01-13 RX ADMIN — TETROFOSMIN 25.5 MILLICURIE: 1.38 INJECTION, POWDER, LYOPHILIZED, FOR SOLUTION INTRAVENOUS at 08:01

## 2025-01-15 ENCOUNTER — OFFICE VISIT (OUTPATIENT)
Dept: ENDOCRINOLOGY | Facility: CLINIC | Age: 75
End: 2025-01-15
Payer: MEDICARE

## 2025-01-15 VITALS
BODY MASS INDEX: 23.81 KG/M2 | TEMPERATURE: 98 F | HEART RATE: 49 BPM | HEIGHT: 61 IN | WEIGHT: 126.13 LBS | OXYGEN SATURATION: 98 % | DIASTOLIC BLOOD PRESSURE: 92 MMHG | SYSTOLIC BLOOD PRESSURE: 160 MMHG

## 2025-01-15 DIAGNOSIS — M81.0 OSTEOPOROSIS, UNSPECIFIED OSTEOPOROSIS TYPE, UNSPECIFIED PATHOLOGICAL FRACTURE PRESENCE: ICD-10-CM

## 2025-01-15 DIAGNOSIS — E53.8 VITAMIN B 12 DEFICIENCY: ICD-10-CM

## 2025-01-15 DIAGNOSIS — I10 HYPERTENSION, UNSPECIFIED TYPE: ICD-10-CM

## 2025-01-15 DIAGNOSIS — E78.5 HYPERLIPIDEMIA, UNSPECIFIED HYPERLIPIDEMIA TYPE: ICD-10-CM

## 2025-01-15 DIAGNOSIS — E03.9 HYPOTHYROIDISM, UNSPECIFIED TYPE: ICD-10-CM

## 2025-01-15 DIAGNOSIS — E89.0 POST-SURGICAL HYPOTHYROIDISM: Primary | ICD-10-CM

## 2025-01-15 PROCEDURE — G2211 COMPLEX E/M VISIT ADD ON: HCPCS | Mod: S$GLB,,, | Performed by: PHYSICIAN ASSISTANT

## 2025-01-15 PROCEDURE — 1126F AMNT PAIN NOTED NONE PRSNT: CPT | Mod: CPTII,S$GLB,, | Performed by: PHYSICIAN ASSISTANT

## 2025-01-15 PROCEDURE — 3080F DIAST BP >= 90 MM HG: CPT | Mod: CPTII,S$GLB,, | Performed by: PHYSICIAN ASSISTANT

## 2025-01-15 PROCEDURE — 3288F FALL RISK ASSESSMENT DOCD: CPT | Mod: CPTII,S$GLB,, | Performed by: PHYSICIAN ASSISTANT

## 2025-01-15 PROCEDURE — 99999 PR PBB SHADOW E&M-EST. PATIENT-LVL III: CPT | Mod: PBBFAC,,, | Performed by: PHYSICIAN ASSISTANT

## 2025-01-15 PROCEDURE — 1101F PT FALLS ASSESS-DOCD LE1/YR: CPT | Mod: CPTII,S$GLB,, | Performed by: PHYSICIAN ASSISTANT

## 2025-01-15 PROCEDURE — 3008F BODY MASS INDEX DOCD: CPT | Mod: CPTII,S$GLB,, | Performed by: PHYSICIAN ASSISTANT

## 2025-01-15 PROCEDURE — 1159F MED LIST DOCD IN RCRD: CPT | Mod: CPTII,S$GLB,, | Performed by: PHYSICIAN ASSISTANT

## 2025-01-15 PROCEDURE — 3077F SYST BP >= 140 MM HG: CPT | Mod: CPTII,S$GLB,, | Performed by: PHYSICIAN ASSISTANT

## 2025-01-15 PROCEDURE — 99213 OFFICE O/P EST LOW 20 MIN: CPT | Mod: S$GLB,,, | Performed by: PHYSICIAN ASSISTANT

## 2025-01-15 PROCEDURE — 1160F RVW MEDS BY RX/DR IN RCRD: CPT | Mod: CPTII,S$GLB,, | Performed by: PHYSICIAN ASSISTANT

## 2025-01-15 RX ORDER — LEVOTHYROXINE SODIUM 100 UG/1
100 TABLET ORAL
Qty: 90 TABLET | Refills: 3 | Status: SHIPPED | OUTPATIENT
Start: 2025-01-15 | End: 2026-01-15

## 2025-01-15 RX ORDER — LEVOTHYROXINE SODIUM 125 UG/1
125 TABLET ORAL
Qty: 90 TABLET | Refills: 3 | Status: SHIPPED | OUTPATIENT
Start: 2025-01-15 | End: 2025-01-15

## 2025-01-15 RX ORDER — LEVOTHYROXINE SODIUM 100 UG/1
100 TABLET ORAL
Qty: 90 TABLET | Refills: 3 | Status: SHIPPED | OUTPATIENT
Start: 2025-01-15 | End: 2025-01-15 | Stop reason: SDUPTHER

## 2025-01-15 NOTE — PROGRESS NOTES
CC: Post surgical hypothyroidism    HPI: Jonathan Hinton is a 74 y.o. female here for hypothyroidism along with pending conditions listed in the Visit Diagnosis.She had a thyroidectomy w/ Dr. Cannon in 7/22 for Graves' Disease. +FHx of thyroid disease in one sister and two nieces. Her dad, brother and three uncles have DM. No hx of neck radiation. She had multiple thyroid nodules. Path was benign.     PMHx, PSHx: reviewed in epic.    Social Hx: no ETOH/tobacco use. She smoked > ten years ~1 pack per day. She quit in 5/18 on her own. She worked at [employer].    Taking Levothyroxine 125 mcg daily.   + hair loss, anxiety, wt gain, cold intolerance.  No palpitations, hair loss, fatigue, diarrhea, constipation.     Eye exam: 10/21- cataracts  + tearing and grittiness in eyes.     Last DEXA 1/23 shows osteoporosis. Started fosamax in 1/23. PT stopped fosamax a few months ago. No falls this year. No steroid injections. She is taking vitamin d and calcium. She runs 3x times weekly. She walks for 45 min. Taking ca and vd. Lifting wts at home. She is doing stretching exercises.     No SOB,  Dysphagia. States her voice is deeper.     Wt Readings from Last 6 Encounters:   01/15/25 57.2 kg (126 lb 1.7 oz)   12/18/24 55.3 kg (122 lb)   11/19/24 54.9 kg (121 lb)   11/06/24 54.9 kg (121 lb)   10/17/24 56.3 kg (124 lb 1.9 oz)   08/20/24 56.4 kg (124 lb 5.4 oz)      ROS:   Constitutional: energy stable, wt gain (4 lbs).   Eyes: + blurry vision, denies double vision.   Cardiovascular: Denies current anginal symptoms  Respiratory: Denies current respiratory difficulty  Gastrointestinal: Denies recent bowel disturbances  GenitoUrinary - No dysuria  Skin: No new skin rash  Musc: legs and arms pain, knee pain  Neurologic:+numbness and tingling in hands, + headaches.    Endocrine: no polyphagia, polydipsia, polyuria  Remainder ROS negative     Personally reviewed imaging and labs below:    Lab Results   Component Value Date    TSH 61.826  (H) 01/08/2025    [id number] 93 05/01/2020    FREET4 0.63 (L) 01/08/2025        Chemistry        Component Value Date/Time     01/08/2025 0757    K 3.9 01/08/2025 0757     01/08/2025 0757    CO2 28 01/08/2025 0757    BUN 20 01/08/2025 0757    CREATININE 1.2 01/08/2025 0757    GLU 79 01/08/2025 0757        Component Value Date/Time    CALCIUM 9.8 01/08/2025 0757    ALKPHOS 46 01/08/2025 0757    AST 19 01/08/2025 0757    ALT 15 01/08/2025 0757    BILITOT 0.5 01/08/2025 0757    ESTGFRAFRICA >60 07/14/2022 1805    EGFRNONAA >60 07/14/2022 1805         Lab Results   Component Value Date    HGBA1C 5.4 07/16/2024    HGBA1C 5.1 06/28/2023    HGBA1C 5.3 05/10/2018      DEXA BONE DENSITY SPINE HIP     CLINICAL HISTORY:  Other specified disorders of bone density and structure, unspecified site     TECHNIQUE:  DXA scanning was performed over the left hip and lumbar spine.  Review of the images confirms satisfactory positioning and technique.     COMPARISON:  None     FINDINGS:  The L1 to L4 vertebral bone mineral density is equal to 1.045 g/cm squared with a T score of -1.0.  There has been 5.8% increase relative to the prior study.     The left femoral neck bone mineral density is equal to 0.572 g/cm squared with a T score of -2.7.  There has been  20.3% decrease relative to the prior study.     There is a 9.8% risk of a major osteoporotic fracture and a 4.0% risk of hip fracture in the next 10 years (FRAX).     Impression:     Osteoporosis with A T-score of -2.7     PE:  GENERAL: Elderly female, Well hydrated. NAD.  RESPIRATORY: Normal effort,   Psych: appropriate mood and affect  NEURO:CN ll-Xll intact, steady gait    Assessment/Plan:   1. Post-surgical hypothyroidism  TSH    T4, Free    TSH    T4, Free      2. Osteoporosis, unspecified osteoporosis type, unspecified pathological fracture presence        3. Hyperlipidemia, unspecified hyperlipidemia type        4. Hypertension, unspecified type        5. Vitamin B  12 deficiency        6. Hypothyroidism, unspecified type  levothyroxine (SYNTHROID) 100 MCG tablet    DISCONTINUED: levothyroxine (SYNTHROID) 100 MCG tablet        Post-surgical hypothyroidism-TSH is wnl. Continue 100 mcg qd (dose recently started 1/6).   Osteoporosis-continue ca and vitamin D. Continue weight bearing exercises. Repeat DEXA scan 1/25. Change to reclast if dexa is similar to prior.  Hymuxduibsrykv-urgbkr-tolgcwcr zocor  Hypertension-stable-Continue meds.  Hypovitaminosis C-llsecq-lrffxvon vd.  Vitamin b 12 deficiency-stable-monitor.    FOLLOWUP  Dexa scan  Tfts in 4 weeks   F/u in 6 mths w/ labs prior -tfts

## 2025-01-16 ENCOUNTER — TELEPHONE (OUTPATIENT)
Dept: CARDIOLOGY | Facility: CLINIC | Age: 75
End: 2025-01-16
Payer: MEDICARE

## 2025-01-16 NOTE — TELEPHONE ENCOUNTER
----- Message from Jazmin Winchester NP sent at 1/16/2025 11:01 AM CST -----  CALL PATIENT    Your stress test is negative for reversible ischemia. The test is about 90% accurate. This means there are no blockages that are causing a problem, ( meaning over 70% blocked.) According to this test you are getting enough blood flow to the coronary arteries. If you are not having any symptoms we can push back your appointment, and save a copay. If you are having symptoms we will be happy to see you.

## 2025-02-12 ENCOUNTER — HOSPITAL ENCOUNTER (OUTPATIENT)
Dept: RADIOLOGY | Facility: CLINIC | Age: 75
Discharge: HOME | End: 2025-02-12
Attending: PHYSICIAN ASSISTANT
Payer: MEDICARE

## 2025-02-12 DIAGNOSIS — Z78.0 POSTMENOPAUSAL: ICD-10-CM

## 2025-02-12 PROCEDURE — 77080 DXA BONE DENSITY AXIAL: CPT | Mod: TC,PO

## 2025-02-12 PROCEDURE — 77080 DXA BONE DENSITY AXIAL: CPT | Mod: 26,,, | Performed by: RADIOLOGY

## 2025-02-19 ENCOUNTER — TELEPHONE (OUTPATIENT)
Dept: FAMILY MEDICINE | Facility: CLINIC | Age: 75
End: 2025-02-19
Payer: MEDICARE

## 2025-02-19 NOTE — TELEPHONE ENCOUNTER
----- Message from Carlene sent at 2/19/2025 12:18 PM CST -----   Type:  Needs Medical AdviceWho Called:  Jodie/RONALDould the patient rather a call back or a response via [facility]?  callBest Call Back Number: [phone] Information:  states needs pt last blood pressure of visit..  Please call back to advise. Thank you!

## 2025-02-21 ENCOUNTER — RESULTS FOLLOW-UP (OUTPATIENT)
Dept: ENDOCRINOLOGY | Facility: CLINIC | Age: 75
End: 2025-02-21

## 2025-03-05 ENCOUNTER — RESULTS FOLLOW-UP (OUTPATIENT)
Dept: ENDOCRINOLOGY | Facility: CLINIC | Age: 75
End: 2025-03-05

## 2025-03-05 DIAGNOSIS — E89.0 POST-SURGICAL HYPOTHYROIDISM: Primary | ICD-10-CM

## 2025-04-23 ENCOUNTER — LAB VISIT (OUTPATIENT)
Dept: LAB | Facility: HOSPITAL | Age: 75
End: 2025-04-23
Attending: PHYSICIAN ASSISTANT
Payer: MEDICARE

## 2025-04-23 DIAGNOSIS — E89.0 POST-SURGICAL HYPOTHYROIDISM: ICD-10-CM

## 2025-04-23 LAB
T4 FREE SERPL-MCNC: 0.9 NG/DL (ref 0.71–1.51)
TSH SERPL-ACNC: 10.64 UIU/ML (ref 0.4–4)

## 2025-04-23 PROCEDURE — 36415 COLL VENOUS BLD VENIPUNCTURE: CPT | Mod: PO

## 2025-04-23 PROCEDURE — 84439 ASSAY OF FREE THYROXINE: CPT

## 2025-04-23 PROCEDURE — 84443 ASSAY THYROID STIM HORMONE: CPT

## 2025-04-28 ENCOUNTER — RESULTS FOLLOW-UP (OUTPATIENT)
Dept: ENDOCRINOLOGY | Facility: CLINIC | Age: 75
End: 2025-04-28

## 2025-04-28 DIAGNOSIS — E89.0 POST-SURGICAL HYPOTHYROIDISM: Primary | ICD-10-CM

## 2025-04-28 RX ORDER — LEVOTHYROXINE SODIUM 112 UG/1
112 TABLET ORAL
Qty: 90 TABLET | Refills: 3 | Status: SHIPPED | OUTPATIENT
Start: 2025-04-28 | End: 2026-04-28

## 2025-06-11 DIAGNOSIS — I10 ESSENTIAL HYPERTENSION: ICD-10-CM

## 2025-06-12 RX ORDER — LISINOPRIL AND HYDROCHLOROTHIAZIDE 10; 12.5 MG/1; MG/1
1 TABLET ORAL
Qty: 90 TABLET | Refills: 3 | Status: SHIPPED | OUTPATIENT
Start: 2025-06-12

## 2025-07-17 ENCOUNTER — LAB VISIT (OUTPATIENT)
Dept: LAB | Facility: HOSPITAL | Age: 75
End: 2025-07-17
Attending: PHYSICIAN ASSISTANT
Payer: MEDICARE

## 2025-07-17 ENCOUNTER — OFFICE VISIT (OUTPATIENT)
Dept: ENDOCRINOLOGY | Facility: CLINIC | Age: 75
End: 2025-07-17
Payer: MEDICARE

## 2025-07-17 VITALS
WEIGHT: 132.69 LBS | SYSTOLIC BLOOD PRESSURE: 130 MMHG | DIASTOLIC BLOOD PRESSURE: 100 MMHG | BODY MASS INDEX: 25.05 KG/M2 | HEART RATE: 58 BPM | OXYGEN SATURATION: 97 % | TEMPERATURE: 98 F | HEIGHT: 61 IN

## 2025-07-17 DIAGNOSIS — E89.0 POST-SURGICAL HYPOTHYROIDISM: ICD-10-CM

## 2025-07-17 DIAGNOSIS — E53.8 VITAMIN B 12 DEFICIENCY: ICD-10-CM

## 2025-07-17 DIAGNOSIS — E89.0 POST-SURGICAL HYPOTHYROIDISM: Primary | ICD-10-CM

## 2025-07-17 DIAGNOSIS — E55.9 HYPOVITAMINOSIS D: ICD-10-CM

## 2025-07-17 DIAGNOSIS — M81.0 OSTEOPOROSIS, UNSPECIFIED OSTEOPOROSIS TYPE, UNSPECIFIED PATHOLOGICAL FRACTURE PRESENCE: ICD-10-CM

## 2025-07-17 DIAGNOSIS — I10 HYPERTENSION, UNSPECIFIED TYPE: ICD-10-CM

## 2025-07-17 DIAGNOSIS — E78.5 HYPERLIPIDEMIA, UNSPECIFIED HYPERLIPIDEMIA TYPE: ICD-10-CM

## 2025-07-17 LAB
T4 FREE SERPL-MCNC: 0.88 NG/DL (ref 0.71–1.51)
TSH SERPL-ACNC: 14.99 UIU/ML (ref 0.4–4)

## 2025-07-17 PROCEDURE — 84439 ASSAY OF FREE THYROXINE: CPT

## 2025-07-17 PROCEDURE — 84443 ASSAY THYROID STIM HORMONE: CPT

## 2025-07-17 PROCEDURE — 3288F FALL RISK ASSESSMENT DOCD: CPT | Mod: CPTII,S$GLB,, | Performed by: PHYSICIAN ASSISTANT

## 2025-07-17 PROCEDURE — 4010F ACE/ARB THERAPY RXD/TAKEN: CPT | Mod: CPTII,S$GLB,, | Performed by: PHYSICIAN ASSISTANT

## 2025-07-17 PROCEDURE — 3008F BODY MASS INDEX DOCD: CPT | Mod: CPTII,S$GLB,, | Performed by: PHYSICIAN ASSISTANT

## 2025-07-17 PROCEDURE — 99213 OFFICE O/P EST LOW 20 MIN: CPT | Mod: S$GLB,,, | Performed by: PHYSICIAN ASSISTANT

## 2025-07-17 PROCEDURE — 1160F RVW MEDS BY RX/DR IN RCRD: CPT | Mod: CPTII,S$GLB,, | Performed by: PHYSICIAN ASSISTANT

## 2025-07-17 PROCEDURE — 36415 COLL VENOUS BLD VENIPUNCTURE: CPT | Mod: PO

## 2025-07-17 PROCEDURE — 1126F AMNT PAIN NOTED NONE PRSNT: CPT | Mod: CPTII,S$GLB,, | Performed by: PHYSICIAN ASSISTANT

## 2025-07-17 PROCEDURE — 3075F SYST BP GE 130 - 139MM HG: CPT | Mod: CPTII,S$GLB,, | Performed by: PHYSICIAN ASSISTANT

## 2025-07-17 PROCEDURE — 99999 PR PBB SHADOW E&M-EST. PATIENT-LVL III: CPT | Mod: PBBFAC,,, | Performed by: PHYSICIAN ASSISTANT

## 2025-07-17 PROCEDURE — 1101F PT FALLS ASSESS-DOCD LE1/YR: CPT | Mod: CPTII,S$GLB,, | Performed by: PHYSICIAN ASSISTANT

## 2025-07-17 PROCEDURE — 1159F MED LIST DOCD IN RCRD: CPT | Mod: CPTII,S$GLB,, | Performed by: PHYSICIAN ASSISTANT

## 2025-07-17 PROCEDURE — G2211 COMPLEX E/M VISIT ADD ON: HCPCS | Mod: S$GLB,,, | Performed by: PHYSICIAN ASSISTANT

## 2025-07-17 PROCEDURE — 3080F DIAST BP >= 90 MM HG: CPT | Mod: CPTII,S$GLB,, | Performed by: PHYSICIAN ASSISTANT

## 2025-07-17 RX ORDER — ALENDRONATE SODIUM 70 MG/1
TABLET ORAL
Qty: 12 TABLET | Refills: 3 | Status: SHIPPED | OUTPATIENT
Start: 2025-07-17

## 2025-07-17 NOTE — PROGRESS NOTES
CC: Post surgical hypothyroidism    HPI: Jonathan Hinton is a 75 y.o. female here for hypothyroidism along with pending conditions listed in the Visit Diagnosis.She had a thyroidectomy w/ Dr. Cannon in 7/22 for Graves' Disease. +FHx of thyroid disease in one sister and two nieces. Her dad, brother and three uncles have DM. No hx of neck radiation. She had multiple thyroid nodules. Path was benign.     PMHx, PSHx: reviewed in epic.    Social Hx: no ETOH/tobacco use. She smoked > ten years ~1 pack per day. She quit in 5/18 on her own. She worked at [employer].    Reports taking Levothyroxine and having blurry vision.    Taking Levothyroxine 112 mcg daily.   + hair loss, anxiety, wt gain, cold intolerance.  No palpitations, hair loss, fatigue, diarrhea, constipation.     Eye exam: 10/21- cataracts  No tearing and grittiness in eyes.     Last DEXA 1/23 shows osteoporosis. Started fosamax in 1/23. PT stopped fosamax a few months ago. No falls this year. No steroid injections. She is taking vitamin d and calcium. She runs 3x times weekly. She walks for 45 min. Taking ca and vd. Lifting wts at home. She is doing stretching exercises.     No SOB,  Dysphagia. States her voice is deeper.     Wt Readings from Last 6 Encounters:   07/17/25 60.2 kg (132 lb 11.5 oz)   01/15/25 57.2 kg (126 lb 1.7 oz)   12/18/24 55.3 kg (122 lb)   11/19/24 54.9 kg (121 lb)   11/06/24 54.9 kg (121 lb)   10/17/24 56.3 kg (124 lb 1.9 oz)      ROS:   Constitutional: energy stable, wt gain (4 lbs).   Eyes: + blurry vision, denies double vision.   Cardiovascular: Denies current anginal symptoms  Respiratory: Denies current respiratory difficulty  Gastrointestinal: Denies recent bowel disturbances  GenitoUrinary - No dysuria  Skin: No new skin rash  Musc: legs and arms pain, knee pain  Neurologic:+numbness and tingling in hands, + headaches.    Endocrine: no polyphagia, polydipsia, polyuria  Remainder ROS negative     Personally reviewed imaging and labs  below:    Lab Results   Component Value Date    TSH 10.642 (H) 04/23/2025    V5VQJLQ 93 05/01/2020    FREET4 0.90 04/23/2025        Chemistry        Component Value Date/Time     01/08/2025 0757    K 3.9 01/08/2025 0757     01/08/2025 0757    CO2 28 01/08/2025 0757    BUN 20 01/08/2025 0757    CREATININE 1.2 01/08/2025 0757    GLU 79 01/08/2025 0757        Component Value Date/Time    CALCIUM 9.8 01/08/2025 0757    ALKPHOS 46 01/08/2025 0757    AST 19 01/08/2025 0757    ALT 15 01/08/2025 0757    BILITOT 0.5 01/08/2025 0757    ESTGFRAFRICA >60 07/14/2022 1805    EGFRNONAA >60 07/14/2022 1805         Lab Results   Component Value Date    HGBA1C 5.4 07/16/2024    HGBA1C 5.1 06/28/2023    HGBA1C 5.3 05/10/2018      Dexa 2/25  FINDINGS:  The L1 to L4 vertebral bone mineral density is equal to 1.095 g/cm squared with a T score of -0.5.  There has been a 4.8% increase in bone mineral density relative to the prior study.     The left femoral neck bone mineral density is equal to 0.627 g/cm squared with a T score of -2.3.  There has been  a 9.6% increase in bone mineral density relative to the prior study.     The total hip bone mineral density is equal to 0.954 g/cm squared with a T score of -0.5.  There has been a 19.0% increase in bone mineral density relative to the prior study.     There is a 7.7% risk of a major osteoporotic fracture and a 2.9% risk of hip fracture in the next 10 years (FRAX).     Impression:     Osteopenia     PE:  GENERAL: Elderly female, Well hydrated. NAD.  RESPIRATORY: Normal effort,   Psych: appropriate mood and affect  NEURO:CN ll-Xll intact, steady gait    Assessment/Plan:   1. Post-surgical hypothyroidism  Comprehensive Metabolic Panel    TSH    T4, Free    TSH    T4, Free      2. Osteoporosis, unspecified osteoporosis type, unspecified pathological fracture presence  alendronate (FOSAMAX) 70 MG tablet      3. Hyperlipidemia, unspecified hyperlipidemia type        4. Hypertension,  unspecified type        5. Vitamin B 12 deficiency  Vitamin B12      6. Hypovitaminosis D  Vitamin D        Post-surgical hypothyroidism-TSH today. Continue 112 mcg qd.   Osteoporosis-continue ca and vitamin D. Continue weight bearing exercises. Repeat DEXA scan 2/27. Continue fosamax.  Vmccajfqomgcxa-fniglu-eriikwpn zocor  Hypertension-high-continue meds. Did not take today. Recheck in two weeks.  Hypovitaminosis S-vdapzb-hgijeltj vd.  Vitamin b 12 deficiency-stable-monitor.    FOLLOWUP  tfts   F/u in 6 mths w/ labs prior -tfts, vd, cmp, vb12

## 2025-07-31 ENCOUNTER — TELEPHONE (OUTPATIENT)
Dept: ENDOCRINOLOGY | Facility: CLINIC | Age: 75
End: 2025-07-31
Payer: MEDICARE